# Patient Record
Sex: FEMALE | Race: WHITE | Employment: FULL TIME | URBAN - METROPOLITAN AREA
[De-identification: names, ages, dates, MRNs, and addresses within clinical notes are randomized per-mention and may not be internally consistent; named-entity substitution may affect disease eponyms.]

---

## 2019-05-06 ENCOUNTER — OFFICE VISIT (OUTPATIENT)
Dept: UROLOGY | Age: 39
End: 2019-05-06
Payer: MEDICARE

## 2019-05-06 VITALS
BODY MASS INDEX: 49.87 KG/M2 | HEIGHT: 62 IN | DIASTOLIC BLOOD PRESSURE: 77 MMHG | WEIGHT: 271 LBS | SYSTOLIC BLOOD PRESSURE: 115 MMHG | HEART RATE: 81 BPM | TEMPERATURE: 97.5 F

## 2019-05-06 DIAGNOSIS — R39.13 INTERMITTENT URINARY STREAM: ICD-10-CM

## 2019-05-06 DIAGNOSIS — N39.0 RECURRENT UTI: Primary | ICD-10-CM

## 2019-05-06 DIAGNOSIS — R39.11 HESITANCY: ICD-10-CM

## 2019-05-06 PROCEDURE — 99204 OFFICE O/P NEW MOD 45 MIN: CPT | Performed by: UROLOGY

## 2019-05-06 ASSESSMENT — ENCOUNTER SYMPTOMS
BACK PAIN: 0
EYE REDNESS: 0
NAUSEA: 0
COUGH: 0
SHORTNESS OF BREATH: 0
DIARRHEA: 1
ABDOMINAL PAIN: 0
VOMITING: 0
EYE PAIN: 0
CONSTIPATION: 1
WHEEZING: 0

## 2019-05-06 NOTE — PROGRESS NOTES
Review of Systems   Constitutional: Negative for appetite change, chills and fatigue. Eyes: Negative for pain, redness and visual disturbance. Respiratory: Negative for cough, shortness of breath and wheezing. Cardiovascular: Positive for leg swelling. Negative for chest pain. Gastrointestinal: Positive for constipation and diarrhea. Negative for abdominal pain, nausea and vomiting. Genitourinary: Positive for difficulty urinating, dysuria, flank pain, frequency and urgency. Negative for hematuria. Musculoskeletal: Negative for back pain, joint swelling and myalgias. Skin: Negative for rash and wound. Neurological: Negative for dizziness, weakness and numbness. Hematological: Does not bruise/bleed easily.

## 2019-05-06 NOTE — PROGRESS NOTES
[facility]  [facility] - [place]  [address]  [address]  [address] [facility] [address]  Dept: 92 Rena Dhillon [facility] Urology Office Note - New Patient    Patient:  Jordyn Boyd  YOB: 1980  Date: 5/6/2019    The patient is a 41 y.o. female who presentstoday for evaluation of the following problems:   Chief Complaint   Patient presents with    New Patient     recurrent UTI    referred by Arie Mendez. HPI  Here for recurrent UTI's in the last year. She has had 4 in the last year. She feels she dos not empty her bladder completely. She is not constipated- but has loose stool and sees GI for this. Urinates every 5 hours during the day, nocturia 0. She drinks 2-3 cans of Diet Coke daily. no tea, coffee, no alcohol. No Hx of kidney stones. Has hesitancy and has intermittent stream with feeling of incompletely. Pt has 4 children by vaginal delivery. Ages 3-12. (Patient's old records have been requested, reviewed and summarized in today's note.)    Summary of old records: N/A    Additional History: N/A    ProceduresToday: N/A    Urinalysis today:  No results found for this visit on 05/06/19.     AUA Symptom Score (5/6/2019):  INCOMPLETE EMPTYING: How often have you had the sensation of not emptying your bladder?: More than Half the time  FREQUENCY: How often do you have to urinate less than every two hours?: Not at all  INTERMITTENCY: How often have you found you stopped and started again several times when you urinated?: About Half the time  URGENCY: How often have you found it difficult to postpone urination?: Less than 1 to 5 times  WEAK STREAM: How often have you had a weak urinary stream?: Almost always  @(4494)@  NOCTURIA: How many times did you typically get up at night to uriniate?: NONE  TOTAL I-PSS SCORE[de-identified] 18  How would you feel if you were to spend the rest of your life with your urinary condition?: Unhappy    Last BUN andcreatinine:  Lab Results Component Value Date    BUN 9 10/08/2016     Lab Results   Component Value Date    CREATININE 0.80 10/08/2016       Additional Lab/Culture results: none    Imaging Reviewed during this Office Visit: none  (results were independently reviewed byphysician and radiology report verified)    PAST MEDICAL, FAMILY AND SOCIAL HISTORY:     Past Medical History:   Diagnosis Date    Chronic back pain      No past surgical history on file. No family history on file. Outpatient Medications Marked as Taking for the 5/6/19 encounter (Office Visit) with Lori Gagnon MD   Medication Sig Dispense Refill    ondansetron (ZOFRAN-ODT) 4 MG disintegrating tablet Take 4 mg by mouth every 8 hours as needed for Nausea or Vomiting      loperamide (IMODIUM) 2 MG capsule Take 2 mg by mouth 4 times daily as needed for Diarrhea         Codeine  Social History     Tobacco Use   Smoking Status Never Smoker   Smokeless Tobacco Never Used      (If patient a smoker, smoking cessation counseling offered)   Social History     Substance and Sexual Activity   Alcohol Use Yes    Comment: occa. REVIEW OF SYSTEMS:  Review of Systems    Physical Exam:    This a 41 y.o. female      Vitals:    05/06/19 1445   BP: 115/77   Pulse:    Temp:      Body mass index is 49.57 kg/m². Physical Exam  Constitutional: Patient in no acute distress, ggod grooming, appropriately dressed  Neuro: Alert and oriented to person, place and time. Psych:Mood normal, affect normal  Skin: No rash noted  HEENT: Head: Normocephalic and atraumatic,Conjunctivae and EOM are normal,Nose- normal, Right/Left External Ear: normal, Mouth: Mucosa Moist  Neck: Supple  Lungs: Respiratory effort is normal  Cardiovascular: strong and regular, no lower leg edema  Abdomen: Soft, non-tender, non-distended with no CVA,    Lymphatics: No cervical palpable lymphadenopathy. Bladder non-tender and not distended. Musculoskeletal: Normal gait and station        Assessment and Plan      1. Recurrent UTI    2. Hesitancy    3. Intermittent urinary stream            Plan:   Cysto and urodynamics in the office (pt has voiding dysfunction and rec uti's, history of four vaginal deliveries)  Renal u/s       Prescriptions Ordered:  No orders of the defined types were placed in this encounter. Orders Placed:  Orders Placed This Encounter   Procedures    US Renal Kidney     Standing Status:   Future     Standing Expiration Date:   4/30/2020     Order Specific Question:   Reason for exam:     Answer:   rec uti's            Leticia Vickers MD    Agree with the ROS entered by the MA.

## 2019-05-28 ENCOUNTER — TELEPHONE (OUTPATIENT)
Dept: UROLOGY | Age: 39
End: 2019-05-28

## 2019-05-28 NOTE — TELEPHONE ENCOUNTER
Phone call to patient, she cancelled urodynamics study for 5/29 with Gino Dean CNP. Left message for her to call office back and push option #1 to reschedule before having cysto done in the office. Advised if no call back, cysto will need to be cancelled as provider would like to have Urodynamic study done prior to cysto appointment.

## 2019-06-03 ENCOUNTER — TELEPHONE (OUTPATIENT)
Dept: UROLOGY | Age: 39
End: 2019-06-03

## 2019-06-14 ENCOUNTER — HOSPITAL ENCOUNTER (OUTPATIENT)
Dept: ULTRASOUND IMAGING | Age: 39
Discharge: HOME | End: 2019-06-16
Payer: COMMERCIAL

## 2019-06-14 DIAGNOSIS — N39.0 RECURRENT UTI: ICD-10-CM

## 2019-06-14 PROCEDURE — 76775 US EXAM ABDO BACK WALL LIM: CPT

## 2019-07-24 ENCOUNTER — PROCEDURE VISIT (OUTPATIENT)
Dept: UROLOGY | Age: 39
End: 2019-07-24
Payer: COMMERCIAL

## 2019-07-24 VITALS
HEART RATE: 76 BPM | DIASTOLIC BLOOD PRESSURE: 86 MMHG | TEMPERATURE: 98 F | HEIGHT: 62 IN | SYSTOLIC BLOOD PRESSURE: 123 MMHG | WEIGHT: 269 LBS | BODY MASS INDEX: 49.5 KG/M2

## 2019-07-24 DIAGNOSIS — R33.9 RETENTION OF URINE: ICD-10-CM

## 2019-07-24 DIAGNOSIS — N39.0 RECURRENT UTI: Primary | ICD-10-CM

## 2019-07-24 PROCEDURE — 51741 ELECTRO-UROFLOWMETRY FIRST: CPT | Performed by: UROLOGY

## 2019-07-24 PROCEDURE — 51728 CYSTOMETROGRAM W/VP: CPT | Performed by: UROLOGY

## 2019-07-24 PROCEDURE — 51797 INTRAABDOMINAL PRESSURE TEST: CPT | Performed by: UROLOGY

## 2019-07-24 PROCEDURE — 51784 ANAL/URINARY MUSCLE STUDY: CPT | Performed by: UROLOGY

## 2019-07-24 NOTE — PROGRESS NOTES
Here for UDS. Hx recurrent UTI's, 4, in the last year. 4 Vag deliveries, no Hx stones, no GYN surgeries. She feels she dos not empty her bladder completely, hesitancy, intermittent stream. . She is not constipated- but has loose stool and sees GI for this. Urinates every 5 hours during the day, nocturia 0. She drinks 2-3 cans of Diet Coke daily. no tea, coffee, no alcohol. Rare stress incontinence. Uroflow: 84 ml. Max flow 6 ml/sec, average 6 ml/sec. PVR 50 ml     Urethral and rectal caths placed, and EMG patches applied. Fill rate: 50 ml/hr  1st sensation: 160 ml  1st desire: 210 ml  Strong desire: 243 ml  Capacity 302 ml. Voided 420 ml max flow 10 ml/sec, average flow 6 ml/sec, max detrusor recording 30 cmH2O. Stress at 183 ml and 244 ml produced no leak.

## 2019-07-29 ENCOUNTER — PROCEDURE VISIT (OUTPATIENT)
Dept: UROLOGY | Age: 39
End: 2019-07-29
Payer: COMMERCIAL

## 2019-07-29 VITALS
DIASTOLIC BLOOD PRESSURE: 83 MMHG | HEART RATE: 91 BPM | SYSTOLIC BLOOD PRESSURE: 116 MMHG | TEMPERATURE: 98.1 F | BODY MASS INDEX: 49.5 KG/M2 | WEIGHT: 269 LBS | HEIGHT: 62 IN

## 2019-07-29 DIAGNOSIS — R31.9 HEMATURIA, UNSPECIFIED TYPE: Primary | ICD-10-CM

## 2019-07-29 PROCEDURE — 52000 CYSTOURETHROSCOPY: CPT | Performed by: UROLOGY

## 2019-07-29 RX ORDER — LISINOPRIL 2.5 MG/1
2.5 TABLET ORAL
COMMUNITY
Start: 2019-07-24

## 2019-07-29 RX ORDER — FERROUS SULFATE 325(65) MG
325 TABLET ORAL
COMMUNITY

## 2019-12-06 ENCOUNTER — OFFICE VISIT (OUTPATIENT)
Dept: DERMATOLOGY | Age: 39
End: 2019-12-06
Payer: COMMERCIAL

## 2019-12-06 VITALS
SYSTOLIC BLOOD PRESSURE: 119 MMHG | HEIGHT: 62 IN | HEART RATE: 82 BPM | DIASTOLIC BLOOD PRESSURE: 84 MMHG | BODY MASS INDEX: 47.33 KG/M2 | WEIGHT: 257.2 LBS | OXYGEN SATURATION: 99 %

## 2019-12-06 DIAGNOSIS — L40.8 SEBOPSORIASIS: Primary | ICD-10-CM

## 2019-12-06 PROCEDURE — 1036F TOBACCO NON-USER: CPT | Performed by: DERMATOLOGY

## 2019-12-06 PROCEDURE — G8484 FLU IMMUNIZE NO ADMIN: HCPCS | Performed by: DERMATOLOGY

## 2019-12-06 PROCEDURE — 99202 OFFICE O/P NEW SF 15 MIN: CPT | Performed by: DERMATOLOGY

## 2019-12-06 PROCEDURE — G8428 CUR MEDS NOT DOCUMENT: HCPCS | Performed by: DERMATOLOGY

## 2019-12-06 PROCEDURE — G8417 CALC BMI ABV UP PARAM F/U: HCPCS | Performed by: DERMATOLOGY

## 2019-12-06 RX ORDER — CLOBETASOL PROPIONATE 0.46 MG/ML
SOLUTION TOPICAL
Qty: 50 ML | Refills: 3 | Status: SHIPPED | OUTPATIENT
Start: 2019-12-06 | End: 2020-06-12 | Stop reason: SDUPTHER

## 2020-01-12 ENCOUNTER — HOSPITAL ENCOUNTER (EMERGENCY)
Age: 40
Discharge: HOME | End: 2020-01-12
Attending: EMERGENCY MEDICINE
Payer: COMMERCIAL

## 2020-01-12 VITALS
BODY MASS INDEX: 47.66 KG/M2 | SYSTOLIC BLOOD PRESSURE: 135 MMHG | HEART RATE: 103 BPM | HEIGHT: 62 IN | TEMPERATURE: 99.5 F | RESPIRATION RATE: 16 BRPM | DIASTOLIC BLOOD PRESSURE: 93 MMHG | WEIGHT: 259 LBS | OXYGEN SATURATION: 96 %

## 2020-01-12 LAB
DIRECT EXAM: NORMAL
DIRECT EXAM: NORMAL
Lab: NORMAL
Lab: NORMAL
SPECIMEN DESCRIPTION: NORMAL
SPECIMEN DESCRIPTION: NORMAL

## 2020-01-12 PROCEDURE — 99283 EMERGENCY DEPT VISIT LOW MDM: CPT

## 2020-01-12 PROCEDURE — 87804 INFLUENZA ASSAY W/OPTIC: CPT

## 2020-01-12 PROCEDURE — 87880 STREP A ASSAY W/OPTIC: CPT

## 2020-01-12 ASSESSMENT — PAIN DESCRIPTION - ONSET: ONSET: ON-GOING

## 2020-01-12 ASSESSMENT — PAIN DESCRIPTION - FREQUENCY: FREQUENCY: CONTINUOUS

## 2020-01-12 ASSESSMENT — PAIN DESCRIPTION - LOCATION: LOCATION: BACK

## 2020-01-12 ASSESSMENT — PAIN DESCRIPTION - ORIENTATION: ORIENTATION: LOWER

## 2020-01-12 ASSESSMENT — PAIN SCALES - GENERAL: PAINLEVEL_OUTOF10: 5

## 2020-01-12 ASSESSMENT — PAIN DESCRIPTION - PROGRESSION: CLINICAL_PROGRESSION: GRADUALLY WORSENING

## 2020-01-12 ASSESSMENT — PAIN DESCRIPTION - PAIN TYPE: TYPE: CHRONIC PAIN

## 2020-01-12 ASSESSMENT — PAIN DESCRIPTION - DESCRIPTORS: DESCRIPTORS: ACHING

## 2020-01-12 NOTE — ED PROVIDER NOTES
supportive care instructions and strict return instructions at the bedside. No orders of the defined types were placed in this encounter. CONSULTS:  None      FINAL IMPRESSION      1. Exposure to influenza    2.  Cough    3. Acute pharyngitis, unspecified etiology          DISPOSITION/PLAN:  DISPOSITION Decision To Discharge      PATIENT REFERRED TO:  Sloane Steel  [address] [facility]  [facility] [address]  [address] [place], [place] [address]jk[facility]  [phone]          DISCHARGE MEDICATIONS:  Discharge Medication List as of 1/12/2020 11:46 AM          (Please note that portions of this note were completed with a voice recognition program.  Efforts were made to edit the dictations but occasionally words are mis-transcribed.)    Adeline Ganser, [address] [facility], PA-C  01/12/20 1155

## 2020-02-03 ENCOUNTER — OFFICE VISIT (OUTPATIENT)
Dept: UROLOGY | Age: 40
End: 2020-02-03
Payer: COMMERCIAL

## 2020-02-03 VITALS
WEIGHT: 259 LBS | DIASTOLIC BLOOD PRESSURE: 80 MMHG | TEMPERATURE: 98.4 F | SYSTOLIC BLOOD PRESSURE: 118 MMHG | HEIGHT: 62 IN | BODY MASS INDEX: 47.66 KG/M2

## 2020-02-03 PROCEDURE — 1036F TOBACCO NON-USER: CPT | Performed by: UROLOGY

## 2020-02-03 PROCEDURE — G8417 CALC BMI ABV UP PARAM F/U: HCPCS | Performed by: UROLOGY

## 2020-02-03 PROCEDURE — 99213 OFFICE O/P EST LOW 20 MIN: CPT | Performed by: UROLOGY

## 2020-02-03 PROCEDURE — G8484 FLU IMMUNIZE NO ADMIN: HCPCS | Performed by: UROLOGY

## 2020-02-03 PROCEDURE — G8427 DOCREV CUR MEDS BY ELIG CLIN: HCPCS | Performed by: UROLOGY

## 2020-02-03 ASSESSMENT — ENCOUNTER SYMPTOMS
VOMITING: 0
BACK PAIN: 0
COUGH: 0
COLOR CHANGE: 0
EYE REDNESS: 0
NAUSEA: 0
WHEEZING: 0
SHORTNESS OF BREATH: 0
ABDOMINAL PAIN: 0
EYE PAIN: 0

## 2020-02-03 NOTE — PROGRESS NOTES
Review of Systems   Constitutional: Negative for appetite change, chills and fever. Eyes: Negative for pain, redness and visual disturbance. Respiratory: Negative for cough, shortness of breath and wheezing. Cardiovascular: Negative for chest pain and leg swelling. Gastrointestinal: Negative for abdominal pain, nausea and vomiting. Genitourinary: Negative for difficulty urinating, dysuria, flank pain, frequency, hematuria, urgency and vaginal discharge. Musculoskeletal: Negative for back pain, joint swelling and myalgias. Skin: Negative for color change, rash and wound. Neurological: Negative for dizziness, tremors and numbness. Hematological: Negative for adenopathy. Does not bruise/bleed easily.
pain     Diabetes mellitus ([facility] [address]     Hypertension      No past surgical history on file. No family history on file. Outpatient Medications Marked as Taking for the 2/3/20 encounter (Office Visit) with Kalani Angel MD   Medication Sig Dispense Refill    aspirin 81 MG tablet Take 81 mg by mouth daily      Probiotic Product (PROBIOTIC PO) Take by mouth      clobetasol (TEMOVATE) 0.05 % external solution Apply topically 2 times daily rash on scalp 50 mL 3    Salicylic Acid 3 % SHAM Use 3-4 times weekly leave on for 5 minutes prior to washing off scalp 236 mL 11    metFORMIN (GLUCOPHAGE) 500 MG tablet Take 500 mg by mouth      lisinopril (PRINIVIL;ZESTRIL) 2.5 MG tablet Take 2.5 mg by mouth      ferrous sulfate 325 (65 Fe) MG tablet Take 325 mg by mouth daily (with breakfast)      Multiple Vitamins-Minerals (WOMENS MULTIVITAMIN PO) Take by mouth      loperamide (IMODIUM) 2 MG capsule Take 2 mg by mouth 4 times daily as needed for Diarrhea        (All medications reviewed and updated by provider sincelast office visit or hospitalization)   Codeine  Social History     Tobacco Use   Smoking Status Never Smoker   Smokeless Tobacco Never Used      (If patient a smoker, smoking cessation counseling offered)     Social History     Substance and Sexual Activity   Alcohol Use Yes    Comment: occa. REVIEW OF SYSTEMS:  Review of Systems      Physical Exam:      Vitals:    02/03/20 1600   BP: 118/80   Temp: 98.4 °F (36.9 °C)     Body mass index is 47.37 kg/m². Patient is a 46 y.o. female in noacute distress and alert and oriented to person, place and time. Physical Exam  Constitutional: Patient in no acute distress. Neuro: Alert andoriented to person, place and time.   Psych: Mood normal, affect normal  Skin: No rash noted  HEENT: Head: Normocephalic and atraumatic  Conjunctivae and EOM are normal. Pupils are equal, round  Nose: Normal  Right External Ear: Normal; Left External Ear: Normal  Mouth: Mucosa

## 2020-06-12 ENCOUNTER — OFFICE VISIT (OUTPATIENT)
Dept: DERMATOLOGY | Age: 40
End: 2020-06-12
Payer: COMMERCIAL

## 2020-06-12 VITALS
HEART RATE: 84 BPM | HEIGHT: 62 IN | TEMPERATURE: 98.1 F | OXYGEN SATURATION: 98 % | BODY MASS INDEX: 47.84 KG/M2 | DIASTOLIC BLOOD PRESSURE: 76 MMHG | WEIGHT: 260 LBS | SYSTOLIC BLOOD PRESSURE: 105 MMHG

## 2020-06-12 PROCEDURE — G8417 CALC BMI ABV UP PARAM F/U: HCPCS | Performed by: DERMATOLOGY

## 2020-06-12 PROCEDURE — 1036F TOBACCO NON-USER: CPT | Performed by: DERMATOLOGY

## 2020-06-12 PROCEDURE — G8427 DOCREV CUR MEDS BY ELIG CLIN: HCPCS | Performed by: DERMATOLOGY

## 2020-06-12 PROCEDURE — 99213 OFFICE O/P EST LOW 20 MIN: CPT | Performed by: DERMATOLOGY

## 2020-06-12 RX ORDER — CLOBETASOL PROPIONATE 0.46 MG/ML
SOLUTION TOPICAL
Qty: 50 ML | Refills: 11 | Status: SHIPPED | OUTPATIENT
Start: 2020-06-12

## 2020-06-12 NOTE — PATIENT INSTRUCTIONS
-Continue the current topicals  -Follow up in one year    A dermatofibroma is a common benign fibrous nodule that most often arises on the skin of the lower legs.  A dermatofibroma is also called a cutaneous fibrous histiocytoma

## 2020-06-15 NOTE — PROGRESS NOTES
 Alcohol use: Yes     Comment: occa. REVIEW OF SYSTEMS:  Review of Systems   Constitutional: Negative. Skin:Denies any new changing, growing or bleeding lesions or rashes except as described in the HPI     PHYSICAL EXAM:   /76 (Site: Left Upper Arm, Position: Sitting, Cuff Size: Large Adult)   Pulse 84   Temp 98.1 °F (36.7 °C)   Ht 5' 2.01\" (1.575 m)   Wt 260 lb (117.9 kg)   SpO2 98%   BMI 47.54 kg/m²     General Exam:  General Appearance: No acute distress, Well nourished     Neuro: Alert and oriented to person, place and time  Psych: Normal affect   Lymph Node: Not performed    Cutaneous Exam: Performed as documented in clinic note below. Sun-exposed skin,which includes the head/face, neck, both arms, digits and/or nails was examined. Pertinent Physical Exam Findings:  Physical Exam  Skin:     Comments: Scalp improved minimal scaling and minimal erythema         Medical Necessity of Exam Performed:   Distribution of patient concerns    Additional Diagnostic Testing performed during exam: Not performed ,  Not performed    ASSESSMENT:   Diagnosis Orders   1. Seborrheic dermatitis         Plan of Action is as Follows:  Assessment 1. Seborrheic dermatitis  - Continue T/Sal  - Taper clobetasol to TIW  - Follow up in 1 year          Patient Instructions   -Continue the current topicals  -Follow up in one year    A dermatofibroma is a common benign fibrous nodule that most often arises on the skin of the lower legs. A dermatofibroma is also called a cutaneous fibrous histiocytoma        Photo surveillance performed: No    Follow-up: 1 year    This note was created with the assistance of aspeech-recognition program.  Although the intention is to generate a document that actually reflects thecontent of the visit, no guarantees can be provided that every mistake has been identified and corrected by editing.     Electronically signed by Kee Leonard MD on 6/15/20 at 7:57 AM UMER

## 2020-09-30 ENCOUNTER — HOSPITAL ENCOUNTER (OUTPATIENT)
Age: 40
Discharge: HOME | End: 2020-09-30

## 2020-09-30 LAB — RUBV IGG SER QL: 37.4 IU/ML

## 2020-09-30 PROCEDURE — 86765 RUBEOLA ANTIBODY: CPT

## 2020-09-30 PROCEDURE — 86762 RUBELLA ANTIBODY: CPT

## 2020-09-30 PROCEDURE — 86481 TB AG RESPONSE T-CELL SUSP: CPT

## 2020-09-30 PROCEDURE — 86735 MUMPS ANTIBODY: CPT

## 2020-09-30 PROCEDURE — 86787 VARICELLA-ZOSTER ANTIBODY: CPT

## 2020-10-02 LAB
MEASLES IMMUNE (IGG): 1.29
MUV IGG SER QL: 1.03
VZV IGG SER QL IA: 3.26

## 2020-10-03 LAB — T-SPOT TB TEST: NORMAL

## 2021-01-01 ENCOUNTER — APPOINTMENT (OUTPATIENT)
Dept: CT IMAGING | Age: 41
DRG: 137 | End: 2021-01-01
Payer: COMMERCIAL

## 2021-01-01 ENCOUNTER — ANESTHESIA EVENT (OUTPATIENT)
Dept: ICU | Age: 41
DRG: 137 | End: 2021-01-01
Payer: COMMERCIAL

## 2021-01-01 ENCOUNTER — ANESTHESIA (OUTPATIENT)
Dept: ICU | Age: 41
DRG: 137 | End: 2021-01-01
Payer: COMMERCIAL

## 2021-01-01 ENCOUNTER — APPOINTMENT (OUTPATIENT)
Dept: GENERAL RADIOLOGY | Age: 41
DRG: 137 | End: 2021-01-01
Payer: COMMERCIAL

## 2021-01-01 ENCOUNTER — HOSPITAL ENCOUNTER (INPATIENT)
Age: 41
LOS: 18 days | DRG: 137 | End: 2021-10-09
Attending: EMERGENCY MEDICINE | Admitting: INTERNAL MEDICINE
Payer: COMMERCIAL

## 2021-01-01 VITALS
DIASTOLIC BLOOD PRESSURE: 34 MMHG | SYSTOLIC BLOOD PRESSURE: 53 MMHG | OXYGEN SATURATION: 90 % | HEIGHT: 63 IN | HEART RATE: 39 BPM | TEMPERATURE: 99 F | WEIGHT: 251.32 LBS | BODY MASS INDEX: 44.53 KG/M2

## 2021-01-01 DIAGNOSIS — U07.1 COVID-19: Primary | ICD-10-CM

## 2021-01-01 LAB
-: ABNORMAL
-: NORMAL
ABSOLUTE EOS #: 0 K/UL (ref 0–0.4)
ABSOLUTE EOS #: 0.19 K/UL (ref 0–0.44)
ABSOLUTE EOS #: 0.21 K/UL (ref 0–0.44)
ABSOLUTE EOS #: 0.31 K/UL (ref 0–0.44)
ABSOLUTE EOS #: 0.4 K/UL (ref 0–0.44)
ABSOLUTE EOS #: 0.5 K/UL (ref 0–0.44)
ABSOLUTE IMMATURE GRANULOCYTE: 0.09 K/UL (ref 0–0.3)
ABSOLUTE IMMATURE GRANULOCYTE: 0.09 K/UL (ref 0–0.3)
ABSOLUTE IMMATURE GRANULOCYTE: 0.12 K/UL (ref 0–0.3)
ABSOLUTE IMMATURE GRANULOCYTE: 0.17 K/UL (ref 0–0.3)
ABSOLUTE IMMATURE GRANULOCYTE: 0.21 K/UL (ref 0–0.3)
ABSOLUTE IMMATURE GRANULOCYTE: 0.4 K/UL (ref 0–0.3)
ABSOLUTE LYMPH #: 0.63 K/UL (ref 1–4.8)
ABSOLUTE LYMPH #: 1.07 K/UL (ref 1.1–3.7)
ABSOLUTE LYMPH #: 1.09 K/UL (ref 1.1–3.7)
ABSOLUTE LYMPH #: 1.46 K/UL (ref 1.1–3.7)
ABSOLUTE LYMPH #: 1.81 K/UL (ref 1.1–3.7)
ABSOLUTE LYMPH #: 1.93 K/UL (ref 1.1–3.7)
ABSOLUTE MONO #: 0.23 K/UL (ref 0.1–0.8)
ABSOLUTE MONO #: 0.26 K/UL (ref 0.1–1.2)
ABSOLUTE MONO #: 0.28 K/UL (ref 0.1–1.2)
ABSOLUTE MONO #: 0.5 K/UL (ref 0.1–1.2)
ABSOLUTE MONO #: 0.5 K/UL (ref 0.1–1.2)
ABSOLUTE MONO #: 0.76 K/UL (ref 0.1–1.2)
ACTION: NORMAL
ACTION: NORMAL
ALBUMIN SERPL-MCNC: 2.3 G/DL (ref 3.5–5.2)
ALBUMIN SERPL-MCNC: 3 G/DL (ref 3.5–5.2)
ALBUMIN SERPL-MCNC: 3 G/DL (ref 3.5–5.2)
ALBUMIN SERPL-MCNC: 3.3 G/DL (ref 3.5–5.2)
ALBUMIN SERPL-MCNC: 3.4 G/DL (ref 3.5–5.2)
ALBUMIN SERPL-MCNC: 3.6 G/DL (ref 3.5–5.2)
ALBUMIN/GLOBULIN RATIO: 1.1 (ref 1–2.5)
ALBUMIN/GLOBULIN RATIO: 1.2 (ref 1–2.5)
ALBUMIN/GLOBULIN RATIO: 1.4 (ref 1–2.5)
ALBUMIN/GLOBULIN RATIO: 1.5 (ref 1–2.5)
ALBUMIN/GLOBULIN RATIO: 1.6 (ref 1–2.5)
ALLEN TEST: ABNORMAL
ALLEN TEST: POSITIVE
ALP BLD-CCNC: 108 U/L (ref 35–104)
ALP BLD-CCNC: 177 U/L (ref 35–104)
ALP BLD-CCNC: 82 U/L (ref 35–104)
ALP BLD-CCNC: 84 U/L (ref 35–104)
ALP BLD-CCNC: 84 U/L (ref 35–104)
ALT SERPL-CCNC: 37 U/L (ref 5–33)
ALT SERPL-CCNC: 38 U/L (ref 5–33)
ALT SERPL-CCNC: 3849 U/L (ref 5–33)
ALT SERPL-CCNC: 46 U/L (ref 5–33)
ALT SERPL-CCNC: 53 U/L (ref 5–33)
AMORPHOUS: ABNORMAL
ANION GAP SERPL CALCULATED.3IONS-SCNC: 11 MMOL/L (ref 9–17)
ANION GAP SERPL CALCULATED.3IONS-SCNC: 12 MMOL/L (ref 9–17)
ANION GAP SERPL CALCULATED.3IONS-SCNC: 13 MMOL/L (ref 9–17)
ANION GAP SERPL CALCULATED.3IONS-SCNC: 14 MMOL/L (ref 9–17)
ANION GAP SERPL CALCULATED.3IONS-SCNC: 14 MMOL/L (ref 9–17)
ANION GAP SERPL CALCULATED.3IONS-SCNC: 15 MMOL/L (ref 9–17)
ANION GAP SERPL CALCULATED.3IONS-SCNC: 16 MMOL/L (ref 9–17)
ANION GAP SERPL CALCULATED.3IONS-SCNC: 17 MMOL/L (ref 9–17)
ANION GAP SERPL CALCULATED.3IONS-SCNC: 17 MMOL/L (ref 9–17)
ANION GAP SERPL CALCULATED.3IONS-SCNC: 19 MMOL/L (ref 9–17)
ANION GAP SERPL CALCULATED.3IONS-SCNC: 20 MMOL/L (ref 9–17)
ANION GAP SERPL CALCULATED.3IONS-SCNC: 26 MMOL/L (ref 9–17)
AST SERPL-CCNC: 24 U/L
AST SERPL-CCNC: 25 U/L
AST SERPL-CCNC: 38 U/L
AST SERPL-CCNC: 4216 U/L
AST SERPL-CCNC: 48 U/L
BACTERIA: ABNORMAL
BASOPHILS # BLD: 0 % (ref 0–2)
BASOPHILS # BLD: 1 % (ref 0–2)
BASOPHILS ABSOLUTE: 0 K/UL (ref 0–0.2)
BASOPHILS ABSOLUTE: 0.03 K/UL (ref 0–0.2)
BASOPHILS ABSOLUTE: 0.04 K/UL (ref 0–0.2)
BASOPHILS ABSOLUTE: 0.05 K/UL (ref 0–0.2)
BASOPHILS ABSOLUTE: 0.06 K/UL (ref 0–0.2)
BASOPHILS ABSOLUTE: <0.03 K/UL (ref 0–0.2)
BETA-HYDROXYBUTYRATE: 0.1 MMOL/L (ref 0.02–0.27)
BETA-HYDROXYBUTYRATE: 1.82 MMOL/L (ref 0.02–0.27)
BILIRUB SERPL-MCNC: 0.54 MG/DL (ref 0.3–1.2)
BILIRUB SERPL-MCNC: 0.91 MG/DL (ref 0.3–1.2)
BILIRUB SERPL-MCNC: 0.92 MG/DL (ref 0.3–1.2)
BILIRUB SERPL-MCNC: 1.18 MG/DL (ref 0.3–1.2)
BILIRUB SERPL-MCNC: 2.52 MG/DL (ref 0.3–1.2)
BILIRUBIN DIRECT: 0.19 MG/DL
BILIRUBIN DIRECT: 0.2 MG/DL
BILIRUBIN DIRECT: 0.22 MG/DL
BILIRUBIN DIRECT: 0.3 MG/DL
BILIRUBIN URINE: NEGATIVE
BILIRUBIN, INDIRECT: 0.35 MG/DL (ref 0–1)
BILIRUBIN, INDIRECT: 0.7 MG/DL (ref 0–1)
BILIRUBIN, INDIRECT: 0.71 MG/DL (ref 0–1)
BILIRUBIN, INDIRECT: 0.88 MG/DL (ref 0–1)
BNP INTERPRETATION: NORMAL
BUN BLDV-MCNC: 14 MG/DL (ref 6–20)
BUN BLDV-MCNC: 16 MG/DL (ref 6–20)
BUN BLDV-MCNC: 18 MG/DL (ref 6–20)
BUN BLDV-MCNC: 19 MG/DL (ref 6–20)
BUN BLDV-MCNC: 19 MG/DL (ref 6–20)
BUN BLDV-MCNC: 21 MG/DL (ref 6–20)
BUN BLDV-MCNC: 22 MG/DL (ref 6–20)
BUN BLDV-MCNC: 24 MG/DL (ref 6–20)
BUN BLDV-MCNC: 26 MG/DL (ref 6–20)
BUN BLDV-MCNC: 26 MG/DL (ref 6–20)
BUN BLDV-MCNC: 27 MG/DL (ref 6–20)
BUN BLDV-MCNC: 27 MG/DL (ref 6–20)
BUN BLDV-MCNC: 29 MG/DL (ref 6–20)
BUN BLDV-MCNC: 30 MG/DL (ref 6–20)
BUN BLDV-MCNC: 31 MG/DL (ref 6–20)
BUN BLDV-MCNC: 34 MG/DL (ref 6–20)
BUN/CREAT BLD: ABNORMAL (ref 9–20)
C-REACTIVE PROTEIN: 128.3 MG/L (ref 0–5)
C-REACTIVE PROTEIN: 143.9 MG/L (ref 0–5)
C-REACTIVE PROTEIN: 202.7 MG/L (ref 0–5)
C-REACTIVE PROTEIN: 205.8 MG/L (ref 0–5)
C-REACTIVE PROTEIN: 47.2 MG/L (ref 0–5)
C-REACTIVE PROTEIN: 5.9 MG/L (ref 0–5)
C-REACTIVE PROTEIN: <3 MG/L (ref 0–5)
CALCIUM SERPL-MCNC: 10.4 MG/DL (ref 8.6–10.4)
CALCIUM SERPL-MCNC: 8 MG/DL (ref 8.6–10.4)
CALCIUM SERPL-MCNC: 8.1 MG/DL (ref 8.6–10.4)
CALCIUM SERPL-MCNC: 8.2 MG/DL (ref 8.6–10.4)
CALCIUM SERPL-MCNC: 8.2 MG/DL (ref 8.6–10.4)
CALCIUM SERPL-MCNC: 8.4 MG/DL (ref 8.6–10.4)
CALCIUM SERPL-MCNC: 8.4 MG/DL (ref 8.6–10.4)
CALCIUM SERPL-MCNC: 8.7 MG/DL (ref 8.6–10.4)
CALCIUM SERPL-MCNC: 8.8 MG/DL (ref 8.6–10.4)
CALCIUM SERPL-MCNC: 9 MG/DL (ref 8.6–10.4)
CALCIUM SERPL-MCNC: 9.1 MG/DL (ref 8.6–10.4)
CASTS UA: ABNORMAL /LPF (ref 0–8)
CHLORIDE BLD-SCNC: 100 MMOL/L (ref 98–107)
CHLORIDE BLD-SCNC: 101 MMOL/L (ref 98–107)
CHLORIDE BLD-SCNC: 104 MMOL/L (ref 98–107)
CHLORIDE BLD-SCNC: 93 MMOL/L (ref 98–107)
CHLORIDE BLD-SCNC: 94 MMOL/L (ref 98–107)
CHLORIDE BLD-SCNC: 95 MMOL/L (ref 98–107)
CHLORIDE BLD-SCNC: 96 MMOL/L (ref 98–107)
CHLORIDE BLD-SCNC: 97 MMOL/L (ref 98–107)
CHLORIDE BLD-SCNC: 98 MMOL/L (ref 98–107)
CHLORIDE BLD-SCNC: 98 MMOL/L (ref 98–107)
CHLORIDE BLD-SCNC: 99 MMOL/L (ref 98–107)
CO2: 17 MMOL/L (ref 20–31)
CO2: 18 MMOL/L (ref 20–31)
CO2: 19 MMOL/L (ref 20–31)
CO2: 20 MMOL/L (ref 20–31)
CO2: 21 MMOL/L (ref 20–31)
CO2: 22 MMOL/L (ref 20–31)
CO2: 24 MMOL/L (ref 20–31)
CO2: 26 MMOL/L (ref 20–31)
CO2: 27 MMOL/L (ref 20–31)
CO2: 28 MMOL/L (ref 20–31)
CO2: 28 MMOL/L (ref 20–31)
CO2: 30 MMOL/L (ref 20–31)
COLOR: YELLOW
COMMENT UA: ABNORMAL
CREAT SERPL-MCNC: 0.54 MG/DL (ref 0.5–0.9)
CREAT SERPL-MCNC: 0.56 MG/DL (ref 0.5–0.9)
CREAT SERPL-MCNC: 0.59 MG/DL (ref 0.5–0.9)
CREAT SERPL-MCNC: 0.6 MG/DL (ref 0.5–0.9)
CREAT SERPL-MCNC: 0.64 MG/DL (ref 0.5–0.9)
CREAT SERPL-MCNC: 0.66 MG/DL (ref 0.5–0.9)
CREAT SERPL-MCNC: 0.67 MG/DL (ref 0.5–0.9)
CREAT SERPL-MCNC: 0.67 MG/DL (ref 0.5–0.9)
CREAT SERPL-MCNC: 0.68 MG/DL (ref 0.5–0.9)
CREAT SERPL-MCNC: 0.69 MG/DL (ref 0.5–0.9)
CREAT SERPL-MCNC: 0.71 MG/DL (ref 0.5–0.9)
CREAT SERPL-MCNC: 0.75 MG/DL (ref 0.5–0.9)
CREAT SERPL-MCNC: 0.76 MG/DL (ref 0.5–0.9)
CREAT SERPL-MCNC: 0.83 MG/DL (ref 0.5–0.9)
CREAT SERPL-MCNC: 0.92 MG/DL (ref 0.5–0.9)
CREAT SERPL-MCNC: 1.42 MG/DL (ref 0.5–0.9)
CRYSTALS, UA: ABNORMAL /HPF
CULTURE: ABNORMAL
D-DIMER QUANTITATIVE: 0.54 MG/L FEU
D-DIMER QUANTITATIVE: 0.56 MG/L FEU
DATE AND TIME: NORMAL
DATE AND TIME: NORMAL
DIFFERENTIAL TYPE: ABNORMAL
EKG ATRIAL RATE: 110 BPM
EKG P AXIS: 15 DEGREES
EKG P-R INTERVAL: 158 MS
EKG Q-T INTERVAL: 356 MS
EKG QRS DURATION: 102 MS
EKG QTC CALCULATION (BAZETT): 481 MS
EKG R AXIS: 118 DEGREES
EKG T AXIS: 51 DEGREES
EKG VENTRICULAR RATE: 110 BPM
EOSINOPHILS RELATIVE PERCENT: 0 % (ref 1–4)
EOSINOPHILS RELATIVE PERCENT: 2 % (ref 1–4)
EOSINOPHILS RELATIVE PERCENT: 2 % (ref 1–4)
EOSINOPHILS RELATIVE PERCENT: 3 % (ref 1–4)
EOSINOPHILS RELATIVE PERCENT: 4 % (ref 1–4)
EOSINOPHILS RELATIVE PERCENT: 5 % (ref 1–4)
EPITHELIAL CELLS UA: ABNORMAL /HPF (ref 0–5)
FERRITIN: 706 UG/L (ref 13–150)
FERRITIN: 756 UG/L (ref 13–150)
FIBRINOGEN: 583 MG/DL (ref 140–420)
FIO2: 100
FIO2: 60
GFR AFRICAN AMERICAN: 50 ML/MIN
GFR AFRICAN AMERICAN: >60 ML/MIN
GFR NON-AFRICAN AMERICAN: 41 ML/MIN
GFR NON-AFRICAN AMERICAN: >60 ML/MIN
GFR SERPL CREATININE-BSD FRML MDRD: ABNORMAL ML/MIN/{1.73_M2}
GLOBULIN: ABNORMAL G/DL (ref 1.5–3.8)
GLUCOSE BLD-MCNC: 101 MG/DL (ref 65–105)
GLUCOSE BLD-MCNC: 105 MG/DL (ref 70–99)
GLUCOSE BLD-MCNC: 106 MG/DL (ref 65–105)
GLUCOSE BLD-MCNC: 111 MG/DL (ref 65–105)
GLUCOSE BLD-MCNC: 117 MG/DL (ref 65–105)
GLUCOSE BLD-MCNC: 133 MG/DL (ref 65–105)
GLUCOSE BLD-MCNC: 136 MG/DL (ref 65–105)
GLUCOSE BLD-MCNC: 141 MG/DL (ref 65–105)
GLUCOSE BLD-MCNC: 142 MG/DL (ref 65–105)
GLUCOSE BLD-MCNC: 149 MG/DL (ref 65–105)
GLUCOSE BLD-MCNC: 154 MG/DL (ref 65–105)
GLUCOSE BLD-MCNC: 156 MG/DL (ref 65–105)
GLUCOSE BLD-MCNC: 159 MG/DL (ref 65–105)
GLUCOSE BLD-MCNC: 159 MG/DL (ref 65–105)
GLUCOSE BLD-MCNC: 163 MG/DL (ref 65–105)
GLUCOSE BLD-MCNC: 171 MG/DL (ref 65–105)
GLUCOSE BLD-MCNC: 172 MG/DL (ref 65–105)
GLUCOSE BLD-MCNC: 173 MG/DL (ref 65–105)
GLUCOSE BLD-MCNC: 173 MG/DL (ref 70–99)
GLUCOSE BLD-MCNC: 174 MG/DL (ref 65–105)
GLUCOSE BLD-MCNC: 190 MG/DL (ref 65–105)
GLUCOSE BLD-MCNC: 193 MG/DL (ref 65–105)
GLUCOSE BLD-MCNC: 194 MG/DL (ref 70–99)
GLUCOSE BLD-MCNC: 194 MG/DL (ref 70–99)
GLUCOSE BLD-MCNC: 196 MG/DL (ref 70–99)
GLUCOSE BLD-MCNC: 203 MG/DL (ref 70–99)
GLUCOSE BLD-MCNC: 212 MG/DL (ref 65–105)
GLUCOSE BLD-MCNC: 215 MG/DL (ref 65–105)
GLUCOSE BLD-MCNC: 218 MG/DL (ref 65–105)
GLUCOSE BLD-MCNC: 222 MG/DL (ref 65–105)
GLUCOSE BLD-MCNC: 223 MG/DL (ref 74–100)
GLUCOSE BLD-MCNC: 226 MG/DL (ref 65–105)
GLUCOSE BLD-MCNC: 229 MG/DL (ref 65–105)
GLUCOSE BLD-MCNC: 230 MG/DL (ref 65–105)
GLUCOSE BLD-MCNC: 232 MG/DL (ref 65–105)
GLUCOSE BLD-MCNC: 234 MG/DL (ref 65–105)
GLUCOSE BLD-MCNC: 234 MG/DL (ref 65–105)
GLUCOSE BLD-MCNC: 237 MG/DL (ref 65–105)
GLUCOSE BLD-MCNC: 239 MG/DL (ref 65–105)
GLUCOSE BLD-MCNC: 241 MG/DL (ref 65–105)
GLUCOSE BLD-MCNC: 247 MG/DL (ref 65–105)
GLUCOSE BLD-MCNC: 248 MG/DL (ref 65–105)
GLUCOSE BLD-MCNC: 249 MG/DL (ref 65–105)
GLUCOSE BLD-MCNC: 253 MG/DL (ref 65–105)
GLUCOSE BLD-MCNC: 255 MG/DL (ref 65–105)
GLUCOSE BLD-MCNC: 257 MG/DL (ref 65–105)
GLUCOSE BLD-MCNC: 260 MG/DL (ref 65–105)
GLUCOSE BLD-MCNC: 260 MG/DL (ref 65–105)
GLUCOSE BLD-MCNC: 261 MG/DL (ref 65–105)
GLUCOSE BLD-MCNC: 264 MG/DL (ref 65–105)
GLUCOSE BLD-MCNC: 272 MG/DL (ref 70–99)
GLUCOSE BLD-MCNC: 279 MG/DL (ref 65–105)
GLUCOSE BLD-MCNC: 289 MG/DL (ref 65–105)
GLUCOSE BLD-MCNC: 290 MG/DL (ref 65–105)
GLUCOSE BLD-MCNC: 290 MG/DL (ref 65–105)
GLUCOSE BLD-MCNC: 294 MG/DL (ref 70–99)
GLUCOSE BLD-MCNC: 302 MG/DL (ref 65–105)
GLUCOSE BLD-MCNC: 314 MG/DL (ref 65–105)
GLUCOSE BLD-MCNC: 320 MG/DL (ref 65–105)
GLUCOSE BLD-MCNC: 320 MG/DL (ref 65–105)
GLUCOSE BLD-MCNC: 322 MG/DL (ref 65–105)
GLUCOSE BLD-MCNC: 325 MG/DL (ref 65–105)
GLUCOSE BLD-MCNC: 329 MG/DL (ref 70–99)
GLUCOSE BLD-MCNC: 330 MG/DL (ref 65–105)
GLUCOSE BLD-MCNC: 336 MG/DL (ref 65–105)
GLUCOSE BLD-MCNC: 337 MG/DL (ref 70–99)
GLUCOSE BLD-MCNC: 339 MG/DL (ref 65–105)
GLUCOSE BLD-MCNC: 341 MG/DL (ref 70–99)
GLUCOSE BLD-MCNC: 345 MG/DL (ref 70–99)
GLUCOSE BLD-MCNC: 346 MG/DL (ref 65–105)
GLUCOSE BLD-MCNC: 347 MG/DL (ref 65–105)
GLUCOSE BLD-MCNC: 347 MG/DL (ref 65–105)
GLUCOSE BLD-MCNC: 351 MG/DL (ref 65–105)
GLUCOSE BLD-MCNC: 351 MG/DL (ref 65–105)
GLUCOSE BLD-MCNC: 354 MG/DL (ref 65–105)
GLUCOSE BLD-MCNC: 358 MG/DL (ref 65–105)
GLUCOSE BLD-MCNC: 358 MG/DL (ref 70–99)
GLUCOSE BLD-MCNC: 359 MG/DL (ref 65–105)
GLUCOSE BLD-MCNC: 368 MG/DL (ref 65–105)
GLUCOSE BLD-MCNC: 368 MG/DL (ref 70–99)
GLUCOSE BLD-MCNC: 372 MG/DL (ref 65–105)
GLUCOSE BLD-MCNC: 374 MG/DL (ref 65–105)
GLUCOSE BLD-MCNC: 375 MG/DL (ref 65–105)
GLUCOSE BLD-MCNC: 384 MG/DL (ref 65–105)
GLUCOSE BLD-MCNC: 392 MG/DL (ref 65–105)
GLUCOSE BLD-MCNC: 394 MG/DL (ref 65–105)
GLUCOSE BLD-MCNC: 400 MG/DL (ref 65–105)
GLUCOSE BLD-MCNC: 403 MG/DL (ref 65–105)
GLUCOSE BLD-MCNC: 424 MG/DL (ref 65–105)
GLUCOSE BLD-MCNC: 426 MG/DL (ref 65–105)
GLUCOSE BLD-MCNC: 446 MG/DL (ref 65–105)
GLUCOSE BLD-MCNC: 456 MG/DL (ref 65–105)
GLUCOSE BLD-MCNC: 469 MG/DL (ref 70–99)
GLUCOSE BLD-MCNC: 474 MG/DL (ref 74–100)
GLUCOSE BLD-MCNC: 495 MG/DL (ref 74–100)
GLUCOSE BLD-MCNC: 56 MG/DL (ref 65–105)
GLUCOSE BLD-MCNC: 57 MG/DL (ref 65–105)
GLUCOSE BLD-MCNC: 57 MG/DL (ref 65–105)
GLUCOSE BLD-MCNC: 67 MG/DL (ref 65–105)
GLUCOSE BLD-MCNC: 78 MG/DL (ref 65–105)
GLUCOSE BLD-MCNC: 80 MG/DL (ref 65–105)
GLUCOSE BLD-MCNC: 86 MG/DL (ref 65–105)
GLUCOSE BLD-MCNC: 92 MG/DL (ref 70–99)
GLUCOSE URINE: ABNORMAL
HCO3 VENOUS: 26.9 MMOL/L (ref 22–29)
HCT VFR BLD CALC: 41.6 % (ref 36.3–47.1)
HCT VFR BLD CALC: 42 % (ref 36.3–47.1)
HCT VFR BLD CALC: 43.4 % (ref 36.3–47.1)
HCT VFR BLD CALC: 43.5 % (ref 36.3–47.1)
HCT VFR BLD CALC: 43.9 % (ref 36.3–47.1)
HCT VFR BLD CALC: 44.1 % (ref 36.3–47.1)
HCT VFR BLD CALC: 44.9 % (ref 36.3–47.1)
HCT VFR BLD CALC: 44.9 % (ref 36.3–47.1)
HCT VFR BLD CALC: 45.1 % (ref 36.3–47.1)
HCT VFR BLD CALC: 45.7 % (ref 36.3–47.1)
HCT VFR BLD CALC: 45.7 % (ref 36.3–47.1)
HCT VFR BLD CALC: 48.3 % (ref 36.3–47.1)
HEMOGLOBIN: 13.4 G/DL (ref 11.9–15.1)
HEMOGLOBIN: 14.2 G/DL (ref 11.9–15.1)
HEMOGLOBIN: 14.3 G/DL (ref 11.9–15.1)
HEMOGLOBIN: 14.7 G/DL (ref 11.9–15.1)
HEMOGLOBIN: 14.9 G/DL (ref 11.9–15.1)
HEMOGLOBIN: 15 G/DL (ref 11.9–15.1)
HEMOGLOBIN: 15.2 G/DL (ref 11.9–15.1)
HEMOGLOBIN: 15.2 G/DL (ref 11.9–15.1)
HEMOGLOBIN: 15.5 G/DL (ref 11.9–15.1)
HEMOGLOBIN: 15.6 G/DL (ref 11.9–15.1)
HEMOGLOBIN: 15.7 G/DL (ref 11.9–15.1)
HEMOGLOBIN: 15.7 G/DL (ref 11.9–15.1)
IMMATURE GRANULOCYTES: 1 %
IMMATURE GRANULOCYTES: 2 %
IMMATURE GRANULOCYTES: 2 %
IMMATURE GRANULOCYTES: 4 %
KETONES, URINE: ABNORMAL
LACTIC ACID, WHOLE BLOOD: 12.4 MMOL/L (ref 0.7–2.1)
LEUKOCYTE ESTERASE, URINE: NEGATIVE
LYMPHOCYTES # BLD: 10 % (ref 24–43)
LYMPHOCYTES # BLD: 10 % (ref 24–43)
LYMPHOCYTES # BLD: 12 % (ref 24–43)
LYMPHOCYTES # BLD: 14 % (ref 24–44)
LYMPHOCYTES # BLD: 16 % (ref 24–43)
LYMPHOCYTES # BLD: 17 % (ref 24–43)
Lab: ABNORMAL
MAGNESIUM: 1.7 MG/DL (ref 1.6–2.6)
MAGNESIUM: 1.8 MG/DL (ref 1.6–2.6)
MAGNESIUM: 2 MG/DL (ref 1.6–2.6)
MAGNESIUM: 2.1 MG/DL (ref 1.6–2.6)
MAGNESIUM: 2.3 MG/DL (ref 1.6–2.6)
MAGNESIUM: 3.2 MG/DL (ref 1.6–2.6)
MCH RBC QN AUTO: 29.1 PG (ref 25.2–33.5)
MCH RBC QN AUTO: 29.1 PG (ref 25.2–33.5)
MCH RBC QN AUTO: 29.3 PG (ref 25.2–33.5)
MCH RBC QN AUTO: 29.5 PG (ref 25.2–33.5)
MCH RBC QN AUTO: 29.5 PG (ref 25.2–33.5)
MCH RBC QN AUTO: 29.7 PG (ref 25.2–33.5)
MCH RBC QN AUTO: 29.9 PG (ref 25.2–33.5)
MCH RBC QN AUTO: 30 PG (ref 25.2–33.5)
MCH RBC QN AUTO: 30.2 PG (ref 25.2–33.5)
MCH RBC QN AUTO: 30.2 PG (ref 25.2–33.5)
MCHC RBC AUTO-ENTMCNC: 30.9 G/DL (ref 28.4–34.8)
MCHC RBC AUTO-ENTMCNC: 31.1 G/DL (ref 28.4–34.8)
MCHC RBC AUTO-ENTMCNC: 33.3 G/DL (ref 28.4–34.8)
MCHC RBC AUTO-ENTMCNC: 33.7 G/DL (ref 28.4–34.8)
MCHC RBC AUTO-ENTMCNC: 33.9 G/DL (ref 28.4–34.8)
MCHC RBC AUTO-ENTMCNC: 34 G/DL (ref 28.4–34.8)
MCHC RBC AUTO-ENTMCNC: 34.1 G/DL (ref 28.4–34.8)
MCHC RBC AUTO-ENTMCNC: 34.1 G/DL (ref 28.4–34.8)
MCHC RBC AUTO-ENTMCNC: 34.3 G/DL (ref 28.4–34.8)
MCHC RBC AUTO-ENTMCNC: 34.4 G/DL (ref 28.4–34.8)
MCHC RBC AUTO-ENTMCNC: 34.5 G/DL (ref 28.4–34.8)
MCHC RBC AUTO-ENTMCNC: 35.8 G/DL (ref 28.4–34.8)
MCV RBC AUTO: 84.4 FL (ref 82.6–102.9)
MCV RBC AUTO: 85 FL (ref 82.6–102.9)
MCV RBC AUTO: 85.4 FL (ref 82.6–102.9)
MCV RBC AUTO: 85.5 FL (ref 82.6–102.9)
MCV RBC AUTO: 86.4 FL (ref 82.6–102.9)
MCV RBC AUTO: 86.5 FL (ref 82.6–102.9)
MCV RBC AUTO: 87.4 FL (ref 82.6–102.9)
MCV RBC AUTO: 87.9 FL (ref 82.6–102.9)
MCV RBC AUTO: 88.5 FL (ref 82.6–102.9)
MCV RBC AUTO: 89.1 FL (ref 82.6–102.9)
MCV RBC AUTO: 95.6 FL (ref 82.6–102.9)
MCV RBC AUTO: 97.3 FL (ref 82.6–102.9)
MODE: ABNORMAL
MODE: NORMAL
MONOCYTES # BLD: 3 % (ref 3–12)
MONOCYTES # BLD: 3 % (ref 3–12)
MONOCYTES # BLD: 4 % (ref 3–12)
MONOCYTES # BLD: 5 % (ref 1–7)
MONOCYTES # BLD: 5 % (ref 3–12)
MONOCYTES # BLD: 6 % (ref 3–12)
MORPHOLOGY: NORMAL
MUCUS: ABNORMAL
NEGATIVE BASE EXCESS, ART: 1 (ref 0–2)
NEGATIVE BASE EXCESS, ART: ABNORMAL (ref 0–2)
NEGATIVE BASE EXCESS, VEN: 10 (ref 0–2)
NITRITE, URINE: NEGATIVE
NOTIFY: NORMAL
NOTIFY: NORMAL
NRBC AUTOMATED: 0 PER 100 WBC
NRBC AUTOMATED: 0.1 PER 100 WBC
NRBC AUTOMATED: 0.3 PER 100 WBC
O2 DEVICE/FLOW/%: ABNORMAL
O2 DEVICE/FLOW/%: NORMAL
O2 SAT, VEN: 72 % (ref 60–85)
OTHER OBSERVATIONS UA: ABNORMAL
PATIENT TEMP: ABNORMAL
PATIENT TEMP: NORMAL
PCO2, VEN: 124.3 MM HG (ref 41–51)
PDW BLD-RTO: 12.8 % (ref 11.8–14.4)
PDW BLD-RTO: 12.9 % (ref 11.8–14.4)
PDW BLD-RTO: 13.1 % (ref 11.8–14.4)
PDW BLD-RTO: 13.2 % (ref 11.8–14.4)
PDW BLD-RTO: 13.3 % (ref 11.8–14.4)
PDW BLD-RTO: 13.3 % (ref 11.8–14.4)
PDW BLD-RTO: 13.4 % (ref 11.8–14.4)
PDW BLD-RTO: 13.4 % (ref 11.8–14.4)
PDW BLD-RTO: 13.9 % (ref 11.8–14.4)
PDW BLD-RTO: 14.1 % (ref 11.8–14.4)
PH UA: 6 (ref 5–8)
PH VENOUS: 6.94 (ref 7.32–7.43)
PHOSPHORUS: 1.2 MG/DL (ref 2.6–4.5)
PHOSPHORUS: 1.7 MG/DL (ref 2.6–4.5)
PHOSPHORUS: 2.1 MG/DL (ref 2.6–4.5)
PHOSPHORUS: 4 MG/DL (ref 2.6–4.5)
PLATELET # BLD: 131 K/UL (ref 138–453)
PLATELET # BLD: 152 K/UL (ref 138–453)
PLATELET # BLD: 169 K/UL (ref 138–453)
PLATELET # BLD: 169 K/UL (ref 138–453)
PLATELET # BLD: 183 K/UL (ref 138–453)
PLATELET # BLD: 194 K/UL (ref 138–453)
PLATELET # BLD: 216 K/UL (ref 138–453)
PLATELET # BLD: 223 K/UL (ref 138–453)
PLATELET # BLD: 238 K/UL (ref 138–453)
PLATELET # BLD: 244 K/UL (ref 138–453)
PLATELET # BLD: 264 K/UL (ref 138–453)
PLATELET # BLD: ABNORMAL K/UL (ref 138–453)
PLATELET ESTIMATE: ABNORMAL
PLATELET, FLUORESCENCE: 119 K/UL (ref 138–453)
PLATELET, IMMATURE FRACTION: 2.2 % (ref 1.1–10.3)
PMV BLD AUTO: 10.2 FL (ref 8.1–13.5)
PMV BLD AUTO: 10.7 FL (ref 8.1–13.5)
PMV BLD AUTO: 10.7 FL (ref 8.1–13.5)
PMV BLD AUTO: 10.8 FL (ref 8.1–13.5)
PMV BLD AUTO: 10.8 FL (ref 8.1–13.5)
PMV BLD AUTO: 11.2 FL (ref 8.1–13.5)
PMV BLD AUTO: 11.5 FL (ref 8.1–13.5)
PMV BLD AUTO: 11.7 FL (ref 8.1–13.5)
PMV BLD AUTO: 12 FL (ref 8.1–13.5)
PMV BLD AUTO: 12.5 FL (ref 8.1–13.5)
PMV BLD AUTO: 12.5 FL (ref 8.1–13.5)
PMV BLD AUTO: ABNORMAL FL (ref 8.1–13.5)
PO2, VEN: 63 MM HG (ref 30–50)
POC HCO3: 22.7 MMOL/L (ref 21–28)
POC HCO3: 28.1 MMOL/L (ref 21–28)
POC HCO3: 28.7 MMOL/L (ref 21–28)
POC HCO3: 28.9 MMOL/L (ref 21–28)
POC LACTIC ACID: 1.21 MMOL/L (ref 0.56–1.39)
POC O2 SATURATION: 100 % (ref 94–98)
POC O2 SATURATION: 93 % (ref 94–98)
POC O2 SATURATION: 94 % (ref 94–98)
POC O2 SATURATION: 97 % (ref 94–98)
POC PCO2 TEMP: ABNORMAL MM HG
POC PCO2 TEMP: NORMAL MM HG
POC PCO2: 35 MM HG (ref 35–48)
POC PCO2: 35.4 MM HG (ref 35–48)
POC PCO2: 41.7 MM HG (ref 35–48)
POC PCO2: 50.1 MM HG (ref 35–48)
POC PH TEMP: ABNORMAL
POC PH TEMP: NORMAL
POC PH: 7.36 (ref 7.35–7.45)
POC PH: 7.41 (ref 7.35–7.45)
POC PH: 7.45 (ref 7.35–7.45)
POC PH: 7.52 (ref 7.35–7.45)
POC PO2 TEMP: ABNORMAL MM HG
POC PO2 TEMP: NORMAL MM HG
POC PO2: 178.4 MM HG (ref 83–108)
POC PO2: 69.5 MM HG (ref 83–108)
POC PO2: 72.5 MM HG (ref 83–108)
POC PO2: 85.4 MM HG (ref 83–108)
POSITIVE BASE EXCESS, ART: 2 (ref 0–3)
POSITIVE BASE EXCESS, ART: 4 (ref 0–3)
POSITIVE BASE EXCESS, ART: 6 (ref 0–3)
POSITIVE BASE EXCESS, ART: NORMAL (ref 0–3)
POSITIVE BASE EXCESS, VEN: ABNORMAL (ref 0–3)
POTASSIUM SERPL-SCNC: 3.5 MMOL/L (ref 3.7–5.3)
POTASSIUM SERPL-SCNC: 3.6 MMOL/L (ref 3.7–5.3)
POTASSIUM SERPL-SCNC: 3.7 MMOL/L (ref 3.7–5.3)
POTASSIUM SERPL-SCNC: 3.7 MMOL/L (ref 3.7–5.3)
POTASSIUM SERPL-SCNC: 3.8 MMOL/L (ref 3.7–5.3)
POTASSIUM SERPL-SCNC: 3.9 MMOL/L (ref 3.7–5.3)
POTASSIUM SERPL-SCNC: 4 MMOL/L (ref 3.7–5.3)
POTASSIUM SERPL-SCNC: 4 MMOL/L (ref 3.7–5.3)
POTASSIUM SERPL-SCNC: 4.1 MMOL/L (ref 3.7–5.3)
POTASSIUM SERPL-SCNC: 4.1 MMOL/L (ref 3.7–5.3)
POTASSIUM SERPL-SCNC: 4.2 MMOL/L (ref 3.7–5.3)
POTASSIUM SERPL-SCNC: 4.4 MMOL/L (ref 3.7–5.3)
POTASSIUM SERPL-SCNC: 4.5 MMOL/L (ref 3.7–5.3)
POTASSIUM SERPL-SCNC: 7 MMOL/L (ref 3.7–5.3)
PRO-BNP: 212 PG/ML
PROCALCITONIN: 0.09 NG/ML
PROCALCITONIN: 0.13 NG/ML
PROTEIN UA: ABNORMAL
RBC # BLD: 4.46 M/UL (ref 3.95–5.11)
RBC # BLD: 4.7 M/UL (ref 3.95–5.11)
RBC # BLD: 4.91 M/UL (ref 3.95–5.11)
RBC # BLD: 4.95 M/UL (ref 3.95–5.11)
RBC # BLD: 5.05 M/UL (ref 3.95–5.11)
RBC # BLD: 5.11 M/UL (ref 3.95–5.11)
RBC # BLD: 5.12 M/UL (ref 3.95–5.11)
RBC # BLD: 5.16 M/UL (ref 3.95–5.11)
RBC # BLD: 5.19 M/UL (ref 3.95–5.11)
RBC # BLD: 5.2 M/UL (ref 3.95–5.11)
RBC # BLD: 5.29 M/UL (ref 3.95–5.11)
RBC # BLD: 5.35 M/UL (ref 3.95–5.11)
RBC # BLD: ABNORMAL 10*6/UL
RBC UA: ABNORMAL /HPF (ref 0–4)
READ BACK: YES
READ BACK: YES
REASON FOR REJECTION: NORMAL
RENAL EPITHELIAL, UA: ABNORMAL /HPF
SAMPLE SITE: ABNORMAL
SAMPLE SITE: NORMAL
SARS-COV-2, RAPID: DETECTED
SEG NEUTROPHILS: 75 % (ref 36–65)
SEG NEUTROPHILS: 75 % (ref 36–65)
SEG NEUTROPHILS: 79 % (ref 36–65)
SEG NEUTROPHILS: 79 % (ref 36–65)
SEG NEUTROPHILS: 79 % (ref 36–66)
SEG NEUTROPHILS: 80 % (ref 36–65)
SEGMENTED NEUTROPHILS ABSOLUTE COUNT: 3.55 K/UL (ref 1.8–7.7)
SEGMENTED NEUTROPHILS ABSOLUTE COUNT: 7.99 K/UL (ref 1.5–8.1)
SEGMENTED NEUTROPHILS ABSOLUTE COUNT: 8.11 K/UL (ref 1.5–8.1)
SEGMENTED NEUTROPHILS ABSOLUTE COUNT: 8.67 K/UL (ref 1.5–8.1)
SEGMENTED NEUTROPHILS ABSOLUTE COUNT: 8.73 K/UL (ref 1.5–8.1)
SEGMENTED NEUTROPHILS ABSOLUTE COUNT: 9.39 K/UL (ref 1.5–8.1)
SODIUM BLD-SCNC: 132 MMOL/L (ref 135–144)
SODIUM BLD-SCNC: 134 MMOL/L (ref 135–144)
SODIUM BLD-SCNC: 134 MMOL/L (ref 135–144)
SODIUM BLD-SCNC: 135 MMOL/L (ref 135–144)
SODIUM BLD-SCNC: 136 MMOL/L (ref 135–144)
SODIUM BLD-SCNC: 137 MMOL/L (ref 135–144)
SODIUM BLD-SCNC: 138 MMOL/L (ref 135–144)
SPECIFIC GRAVITY UA: 1.04 (ref 1–1.03)
SPECIMEN DESCRIPTION: ABNORMAL
SPECIMEN DESCRIPTION: ABNORMAL
TCO2 (CALC), ART: ABNORMAL MMOL/L (ref 22–29)
TCO2 (CALC), ART: NORMAL MMOL/L (ref 22–29)
TOTAL CO2, VENOUS: ABNORMAL MMOL/L (ref 23–30)
TOTAL PROTEIN: 4 G/DL (ref 6.4–8.3)
TOTAL PROTEIN: 5.5 G/DL (ref 6.4–8.3)
TOTAL PROTEIN: 5.5 G/DL (ref 6.4–8.3)
TOTAL PROTEIN: 5.6 G/DL (ref 6.4–8.3)
TOTAL PROTEIN: 7 G/DL (ref 6.4–8.3)
TRICHOMONAS: ABNORMAL
TROPONIN INTERP: ABNORMAL
TROPONIN T: ABNORMAL NG/ML
TROPONIN, HIGH SENSITIVITY: 281 NG/L (ref 0–14)
TURBIDITY: CLEAR
URINE HGB: NEGATIVE
UROBILINOGEN, URINE: NORMAL
WBC # BLD: 10.3 K/UL (ref 3.5–11.3)
WBC # BLD: 10.6 K/UL (ref 3.5–11.3)
WBC # BLD: 10.8 K/UL (ref 3.5–11.3)
WBC # BLD: 11.8 K/UL (ref 3.5–11.3)
WBC # BLD: 11.9 K/UL (ref 3.5–11.3)
WBC # BLD: 22.9 K/UL (ref 3.5–11.3)
WBC # BLD: 3.6 K/UL (ref 3.5–11.3)
WBC # BLD: 4.5 K/UL (ref 3.5–11.3)
WBC # BLD: 4.9 K/UL (ref 3.5–11.3)
WBC # BLD: 5.3 K/UL (ref 3.5–11.3)
WBC # BLD: 6 K/UL (ref 3.5–11.3)
WBC # BLD: 9.6 K/UL (ref 3.5–11.3)
WBC # BLD: ABNORMAL 10*3/UL
WBC UA: ABNORMAL /HPF (ref 0–5)
YEAST: ABNORMAL
ZZ NTE CLEAN UP: ORDERED TEST: NORMAL
ZZ NTE WITH NAME CLEAN UP: SPECIMEN SOURCE: NORMAL

## 2021-01-01 PROCEDURE — 2580000003 HC RX 258: Performed by: NURSE PRACTITIONER

## 2021-01-01 PROCEDURE — 99291 CRITICAL CARE FIRST HOUR: CPT | Performed by: INTERNAL MEDICINE

## 2021-01-01 PROCEDURE — 97110 THERAPEUTIC EXERCISES: CPT

## 2021-01-01 PROCEDURE — 80048 BASIC METABOLIC PNL TOTAL CA: CPT

## 2021-01-01 PROCEDURE — 6370000000 HC RX 637 (ALT 250 FOR IP): Performed by: NURSE PRACTITIONER

## 2021-01-01 PROCEDURE — 6360000002 HC RX W HCPCS: Performed by: STUDENT IN AN ORGANIZED HEALTH CARE EDUCATION/TRAINING PROGRAM

## 2021-01-01 PROCEDURE — 6370000000 HC RX 637 (ALT 250 FOR IP): Performed by: INTERNAL MEDICINE

## 2021-01-01 PROCEDURE — 84145 PROCALCITONIN (PCT): CPT

## 2021-01-01 PROCEDURE — 36415 COLL VENOUS BLD VENIPUNCTURE: CPT

## 2021-01-01 PROCEDURE — 82947 ASSAY GLUCOSE BLOOD QUANT: CPT

## 2021-01-01 PROCEDURE — 94761 N-INVAS EAR/PLS OXIMETRY MLT: CPT

## 2021-01-01 PROCEDURE — 94660 CPAP INITIATION&MGMT: CPT

## 2021-01-01 PROCEDURE — 2060000000 HC ICU INTERMEDIATE R&B

## 2021-01-01 PROCEDURE — 99233 SBSQ HOSP IP/OBS HIGH 50: CPT | Performed by: INTERNAL MEDICINE

## 2021-01-01 PROCEDURE — 6360000002 HC RX W HCPCS: Performed by: NURSE PRACTITIONER

## 2021-01-01 PROCEDURE — 2000000000 HC ICU R&B

## 2021-01-01 PROCEDURE — 6360000002 HC RX W HCPCS: Performed by: INTERNAL MEDICINE

## 2021-01-01 PROCEDURE — 84100 ASSAY OF PHOSPHORUS: CPT

## 2021-01-01 PROCEDURE — 80051 ELECTROLYTE PANEL: CPT

## 2021-01-01 PROCEDURE — 94640 AIRWAY INHALATION TREATMENT: CPT

## 2021-01-01 PROCEDURE — 2500000003 HC RX 250 WO HCPCS: Performed by: STUDENT IN AN ORGANIZED HEALTH CARE EDUCATION/TRAINING PROGRAM

## 2021-01-01 PROCEDURE — 85379 FIBRIN DEGRADATION QUANT: CPT

## 2021-01-01 PROCEDURE — 2700000000 HC OXYGEN THERAPY PER DAY

## 2021-01-01 PROCEDURE — 80053 COMPREHEN METABOLIC PANEL: CPT

## 2021-01-01 PROCEDURE — 99232 SBSQ HOSP IP/OBS MODERATE 35: CPT | Performed by: INTERNAL MEDICINE

## 2021-01-01 PROCEDURE — 85384 FIBRINOGEN ACTIVITY: CPT

## 2021-01-01 PROCEDURE — 37799 UNLISTED PX VASCULAR SURGERY: CPT

## 2021-01-01 PROCEDURE — 82803 BLOOD GASES ANY COMBINATION: CPT

## 2021-01-01 PROCEDURE — 5A1935Z RESPIRATORY VENTILATION, LESS THAN 24 CONSECUTIVE HOURS: ICD-10-PCS | Performed by: INTERNAL MEDICINE

## 2021-01-01 PROCEDURE — 6370000000 HC RX 637 (ALT 250 FOR IP): Performed by: FAMILY MEDICINE

## 2021-01-01 PROCEDURE — 87186 SC STD MICRODIL/AGAR DIL: CPT

## 2021-01-01 PROCEDURE — 71260 CT THORAX DX C+: CPT

## 2021-01-01 PROCEDURE — 82010 KETONE BODYS QUAN: CPT

## 2021-01-01 PROCEDURE — 99285 EMERGENCY DEPT VISIT HI MDM: CPT

## 2021-01-01 PROCEDURE — 83735 ASSAY OF MAGNESIUM: CPT

## 2021-01-01 PROCEDURE — 80076 HEPATIC FUNCTION PANEL: CPT

## 2021-01-01 PROCEDURE — 85025 COMPLETE CBC W/AUTO DIFF WBC: CPT

## 2021-01-01 PROCEDURE — 94664 DEMO&/EVAL PT USE INHALER: CPT

## 2021-01-01 PROCEDURE — 82728 ASSAY OF FERRITIN: CPT

## 2021-01-01 PROCEDURE — 99255 IP/OBS CONSLTJ NEW/EST HI 80: CPT | Performed by: INTERNAL MEDICINE

## 2021-01-01 PROCEDURE — 97530 THERAPEUTIC ACTIVITIES: CPT

## 2021-01-01 PROCEDURE — 86140 C-REACTIVE PROTEIN: CPT

## 2021-01-01 PROCEDURE — 74018 RADEX ABDOMEN 1 VIEW: CPT

## 2021-01-01 PROCEDURE — 87040 BLOOD CULTURE FOR BACTERIA: CPT

## 2021-01-01 PROCEDURE — 71045 X-RAY EXAM CHEST 1 VIEW: CPT

## 2021-01-01 PROCEDURE — 99254 IP/OBS CNSLTJ NEW/EST MOD 60: CPT | Performed by: OTOLARYNGOLOGY

## 2021-01-01 PROCEDURE — 99233 SBSQ HOSP IP/OBS HIGH 50: CPT | Performed by: NURSE PRACTITIONER

## 2021-01-01 PROCEDURE — 82330 ASSAY OF CALCIUM: CPT

## 2021-01-01 PROCEDURE — XW033H5 INTRODUCTION OF TOCILIZUMAB INTO PERIPHERAL VEIN, PERCUTANEOUS APPROACH, NEW TECHNOLOGY GROUP 5: ICD-10-PCS | Performed by: NURSE PRACTITIONER

## 2021-01-01 PROCEDURE — 87635 SARS-COV-2 COVID-19 AMP PRB: CPT

## 2021-01-01 PROCEDURE — 97535 SELF CARE MNGMENT TRAINING: CPT

## 2021-01-01 PROCEDURE — 2580000003 HC RX 258: Performed by: STUDENT IN AN ORGANIZED HEALTH CARE EDUCATION/TRAINING PROGRAM

## 2021-01-01 PROCEDURE — 85055 RETICULATED PLATELET ASSAY: CPT

## 2021-01-01 PROCEDURE — 36600 WITHDRAWAL OF ARTERIAL BLOOD: CPT

## 2021-01-01 PROCEDURE — 84484 ASSAY OF TROPONIN QUANT: CPT

## 2021-01-01 PROCEDURE — 2500000003 HC RX 250 WO HCPCS: Performed by: NURSE PRACTITIONER

## 2021-01-01 PROCEDURE — 2500000003 HC RX 250 WO HCPCS

## 2021-01-01 PROCEDURE — 85027 COMPLETE CBC AUTOMATED: CPT

## 2021-01-01 PROCEDURE — 02HV33Z INSERTION OF INFUSION DEVICE INTO SUPERIOR VENA CAVA, PERCUTANEOUS APPROACH: ICD-10-PCS | Performed by: STUDENT IN AN ORGANIZED HEALTH CARE EDUCATION/TRAINING PROGRAM

## 2021-01-01 PROCEDURE — APPSS45 APP SPLIT SHARED TIME 31-45 MINUTES: Performed by: NURSE PRACTITIONER

## 2021-01-01 PROCEDURE — 87086 URINE CULTURE/COLONY COUNT: CPT

## 2021-01-01 PROCEDURE — 93970 EXTREMITY STUDY: CPT

## 2021-01-01 PROCEDURE — 73562 X-RAY EXAM OF KNEE 3: CPT

## 2021-01-01 PROCEDURE — 04HY32Z INSERTION OF MONITORING DEVICE INTO LOWER ARTERY, PERCUTANEOUS APPROACH: ICD-10-PCS | Performed by: STUDENT IN AN ORGANIZED HEALTH CARE EDUCATION/TRAINING PROGRAM

## 2021-01-01 PROCEDURE — 97166 OT EVAL MOD COMPLEX 45 MIN: CPT

## 2021-01-01 PROCEDURE — 83605 ASSAY OF LACTIC ACID: CPT

## 2021-01-01 PROCEDURE — 6370000000 HC RX 637 (ALT 250 FOR IP): Performed by: STUDENT IN AN ORGANIZED HEALTH CARE EDUCATION/TRAINING PROGRAM

## 2021-01-01 PROCEDURE — XW033E5 INTRODUCTION OF REMDESIVIR ANTI-INFECTIVE INTO PERIPHERAL VEIN, PERCUTANEOUS APPROACH, NEW TECHNOLOGY GROUP 5: ICD-10-PCS | Performed by: STUDENT IN AN ORGANIZED HEALTH CARE EDUCATION/TRAINING PROGRAM

## 2021-01-01 PROCEDURE — 31500 INSERT EMERGENCY AIRWAY: CPT | Performed by: ANESTHESIOLOGY

## 2021-01-01 PROCEDURE — 93010 ELECTROCARDIOGRAM REPORT: CPT | Performed by: INTERNAL MEDICINE

## 2021-01-01 PROCEDURE — 86038 ANTINUCLEAR ANTIBODIES: CPT

## 2021-01-01 PROCEDURE — 76937 US GUIDE VASCULAR ACCESS: CPT

## 2021-01-01 PROCEDURE — 6360000002 HC RX W HCPCS

## 2021-01-01 PROCEDURE — 8E0ZXY6 ISOLATION: ICD-10-PCS | Performed by: STUDENT IN AN ORGANIZED HEALTH CARE EDUCATION/TRAINING PROGRAM

## 2021-01-01 PROCEDURE — 82248 BILIRUBIN DIRECT: CPT

## 2021-01-01 PROCEDURE — 85014 HEMATOCRIT: CPT

## 2021-01-01 PROCEDURE — 97162 PT EVAL MOD COMPLEX 30 MIN: CPT

## 2021-01-01 PROCEDURE — 86225 DNA ANTIBODY NATIVE: CPT

## 2021-01-01 PROCEDURE — 2580000003 HC RX 258: Performed by: INTERNAL MEDICINE

## 2021-01-01 PROCEDURE — APPSS30 APP SPLIT SHARED TIME 16-30 MINUTES: Performed by: NURSE PRACTITIONER

## 2021-01-01 PROCEDURE — 83880 ASSAY OF NATRIURETIC PEPTIDE: CPT

## 2021-01-01 PROCEDURE — 81001 URINALYSIS AUTO W/SCOPE: CPT

## 2021-01-01 PROCEDURE — 0BH17EZ INSERTION OF ENDOTRACHEAL AIRWAY INTO TRACHEA, VIA NATURAL OR ARTIFICIAL OPENING: ICD-10-PCS | Performed by: INTERNAL MEDICINE

## 2021-01-01 PROCEDURE — 93005 ELECTROCARDIOGRAM TRACING: CPT | Performed by: STUDENT IN AN ORGANIZED HEALTH CARE EDUCATION/TRAINING PROGRAM

## 2021-01-01 PROCEDURE — 87088 URINE BACTERIA CULTURE: CPT

## 2021-01-01 PROCEDURE — 82565 ASSAY OF CREATININE: CPT

## 2021-01-01 PROCEDURE — 84520 ASSAY OF UREA NITROGEN: CPT

## 2021-01-01 PROCEDURE — 97116 GAIT TRAINING THERAPY: CPT

## 2021-01-01 PROCEDURE — 1200000000 HC SEMI PRIVATE

## 2021-01-01 PROCEDURE — 6360000004 HC RX CONTRAST MEDICATION: Performed by: INTERNAL MEDICINE

## 2021-01-01 PROCEDURE — 80069 RENAL FUNCTION PANEL: CPT

## 2021-01-01 PROCEDURE — 99231 SBSQ HOSP IP/OBS SF/LOW 25: CPT | Performed by: INTERNAL MEDICINE

## 2021-01-01 PROCEDURE — 99222 1ST HOSP IP/OBS MODERATE 55: CPT | Performed by: NURSE PRACTITIONER

## 2021-01-01 RX ORDER — DEXTROSE MONOHYDRATE 50 MG/ML
100 INJECTION, SOLUTION INTRAVENOUS PRN
Status: DISCONTINUED | OUTPATIENT
Start: 2021-01-01 | End: 2021-01-01 | Stop reason: HOSPADM

## 2021-01-01 RX ORDER — POTASSIUM CHLORIDE 7.45 MG/ML
10 INJECTION INTRAVENOUS PRN
Status: DISCONTINUED | OUTPATIENT
Start: 2021-01-01 | End: 2021-01-01 | Stop reason: HOSPADM

## 2021-01-01 RX ORDER — MINERAL OIL AND WHITE PETROLATUM 150; 830 MG/G; MG/G
OINTMENT OPHTHALMIC PRN
Status: DISCONTINUED | OUTPATIENT
Start: 2021-01-01 | End: 2021-01-01 | Stop reason: HOSPADM

## 2021-01-01 RX ORDER — SODIUM CHLORIDE 0.9 % (FLUSH) 0.9 %
10 SYRINGE (ML) INJECTION PRN
Status: DISCONTINUED | OUTPATIENT
Start: 2021-01-01 | End: 2021-01-01 | Stop reason: HOSPADM

## 2021-01-01 RX ORDER — GUAIFENESIN DEXTROMETHORPHAN HYDROBROMIDE ORAL SOLUTION 10; 100 MG/5ML; MG/5ML
10 SOLUTION ORAL EVERY 4 HOURS PRN
Status: DISCONTINUED | OUTPATIENT
Start: 2021-01-01 | End: 2021-01-01

## 2021-01-01 RX ORDER — FUROSEMIDE 10 MG/ML
20 INJECTION INTRAMUSCULAR; INTRAVENOUS ONCE
Status: COMPLETED | OUTPATIENT
Start: 2021-01-01 | End: 2021-01-01

## 2021-01-01 RX ORDER — PROPOFOL 10 MG/ML
INJECTION, EMULSION INTRAVENOUS
Status: DISCONTINUED
Start: 2021-01-01 | End: 2021-01-01 | Stop reason: HOSPADM

## 2021-01-01 RX ORDER — FENTANYL CITRATE 50 UG/ML
50 INJECTION, SOLUTION INTRAMUSCULAR; INTRAVENOUS
Status: DISCONTINUED | OUTPATIENT
Start: 2021-01-01 | End: 2021-01-01 | Stop reason: HOSPADM

## 2021-01-01 RX ORDER — DEXTROSE MONOHYDRATE 25 G/50ML
12.5 INJECTION, SOLUTION INTRAVENOUS PRN
Status: DISCONTINUED | OUTPATIENT
Start: 2021-01-01 | End: 2021-01-01

## 2021-01-01 RX ORDER — POTASSIUM CHLORIDE 7.45 MG/ML
10 INJECTION INTRAVENOUS PRN
Status: DISCONTINUED | OUTPATIENT
Start: 2021-01-01 | End: 2021-01-01

## 2021-01-01 RX ORDER — MORPHINE SULFATE 4 MG/ML
4 INJECTION, SOLUTION INTRAMUSCULAR; INTRAVENOUS ONCE
Status: DISCONTINUED | OUTPATIENT
Start: 2021-01-01 | End: 2021-01-01

## 2021-01-01 RX ORDER — DEXTROSE MONOHYDRATE 25 G/50ML
12.5 INJECTION, SOLUTION INTRAVENOUS PRN
Status: DISCONTINUED | OUTPATIENT
Start: 2021-01-01 | End: 2021-01-01 | Stop reason: SDUPTHER

## 2021-01-01 RX ORDER — POTASSIUM CHLORIDE 20 MEQ/1
20 TABLET, EXTENDED RELEASE ORAL ONCE
Status: COMPLETED | OUTPATIENT
Start: 2021-01-01 | End: 2021-01-01

## 2021-01-01 RX ORDER — GUAIFENESIN DEXTROMETHORPHAN HYDROBROMIDE ORAL SOLUTION 10; 100 MG/5ML; MG/5ML
10 SOLUTION ORAL EVERY 4 HOURS PRN
Status: DISCONTINUED | OUTPATIENT
Start: 2021-01-01 | End: 2021-01-01 | Stop reason: HOSPADM

## 2021-01-01 RX ORDER — NICOTINE POLACRILEX 4 MG
15 LOZENGE BUCCAL PRN
Status: DISCONTINUED | OUTPATIENT
Start: 2021-01-01 | End: 2021-01-01 | Stop reason: SDUPTHER

## 2021-01-01 RX ORDER — DEXAMETHASONE SODIUM PHOSPHATE 10 MG/ML
10 INJECTION INTRAMUSCULAR; INTRAVENOUS DAILY
Status: COMPLETED | OUTPATIENT
Start: 2021-01-01 | End: 2021-01-01

## 2021-01-01 RX ORDER — ALBUTEROL SULFATE 90 UG/1
2 AEROSOL, METERED RESPIRATORY (INHALATION) 4 TIMES DAILY
Status: DISCONTINUED | OUTPATIENT
Start: 2021-01-01 | End: 2021-01-01

## 2021-01-01 RX ORDER — METOPROLOL TARTRATE 5 MG/5ML
5 INJECTION INTRAVENOUS ONCE
Status: COMPLETED | OUTPATIENT
Start: 2021-01-01 | End: 2021-01-01

## 2021-01-01 RX ORDER — VITAMIN B COMPLEX
2000 TABLET ORAL DAILY
Status: DISCONTINUED | OUTPATIENT
Start: 2021-01-01 | End: 2021-01-01

## 2021-01-01 RX ORDER — INSULIN GLARGINE 100 [IU]/ML
25 INJECTION, SOLUTION SUBCUTANEOUS 2 TIMES DAILY
Status: DISCONTINUED | OUTPATIENT
Start: 2021-01-01 | End: 2021-01-01 | Stop reason: HOSPADM

## 2021-01-01 RX ORDER — DEXTROSE MONOHYDRATE 25 G/50ML
12.5 INJECTION, SOLUTION INTRAVENOUS PRN
Status: DISCONTINUED | OUTPATIENT
Start: 2021-01-01 | End: 2021-01-01 | Stop reason: HOSPADM

## 2021-01-01 RX ORDER — ALBUTEROL SULFATE 90 UG/1
2 AEROSOL, METERED RESPIRATORY (INHALATION)
Status: DISCONTINUED | OUTPATIENT
Start: 2021-01-01 | End: 2021-01-01

## 2021-01-01 RX ORDER — METOPROLOL TARTRATE 5 MG/5ML
INJECTION INTRAVENOUS
Status: COMPLETED
Start: 2021-01-01 | End: 2021-01-01

## 2021-01-01 RX ORDER — SODIUM CHLORIDE 450 MG/100ML
INJECTION, SOLUTION INTRAVENOUS CONTINUOUS
Status: DISCONTINUED | OUTPATIENT
Start: 2021-01-01 | End: 2021-01-01

## 2021-01-01 RX ORDER — FENTANYL CITRATE 50 UG/ML
50 INJECTION, SOLUTION INTRAMUSCULAR; INTRAVENOUS ONCE
Status: COMPLETED | OUTPATIENT
Start: 2021-01-01 | End: 2021-01-01

## 2021-01-01 RX ORDER — DEXTROSE MONOHYDRATE 25 G/50ML
25 INJECTION, SOLUTION INTRAVENOUS ONCE
Status: DISCONTINUED | OUTPATIENT
Start: 2021-01-01 | End: 2021-01-01 | Stop reason: HOSPADM

## 2021-01-01 RX ORDER — ACETAMINOPHEN 325 MG/1
650 TABLET ORAL ONCE
Status: COMPLETED | OUTPATIENT
Start: 2021-01-01 | End: 2021-01-01

## 2021-01-01 RX ORDER — ALBUTEROL SULFATE 90 UG/1
2 AEROSOL, METERED RESPIRATORY (INHALATION) EVERY 4 HOURS PRN
Status: DISCONTINUED | OUTPATIENT
Start: 2021-01-01 | End: 2021-01-01 | Stop reason: HOSPADM

## 2021-01-01 RX ORDER — INSULIN GLARGINE 100 [IU]/ML
40 INJECTION, SOLUTION SUBCUTANEOUS 2 TIMES DAILY
Status: DISCONTINUED | OUTPATIENT
Start: 2021-01-01 | End: 2021-01-01

## 2021-01-01 RX ORDER — MAGNESIUM SULFATE 1 G/100ML
1000 INJECTION INTRAVENOUS PRN
Status: DISCONTINUED | OUTPATIENT
Start: 2021-01-01 | End: 2021-01-01

## 2021-01-01 RX ORDER — BUSPIRONE HYDROCHLORIDE 10 MG/1
10 TABLET ORAL 3 TIMES DAILY
Status: DISCONTINUED | OUTPATIENT
Start: 2021-01-01 | End: 2021-01-01 | Stop reason: HOSPADM

## 2021-01-01 RX ORDER — FAMOTIDINE 20 MG/1
20 TABLET, FILM COATED ORAL DAILY
Status: DISCONTINUED | OUTPATIENT
Start: 2021-01-01 | End: 2021-01-01 | Stop reason: HOSPADM

## 2021-01-01 RX ORDER — LANOLIN ALCOHOL/MO/W.PET/CERES
325 CREAM (GRAM) TOPICAL
Status: DISCONTINUED | OUTPATIENT
Start: 2021-01-01 | End: 2021-01-01 | Stop reason: HOSPADM

## 2021-01-01 RX ORDER — ASCORBIC ACID 500 MG
500 TABLET ORAL 2 TIMES DAILY
Status: DISCONTINUED | OUTPATIENT
Start: 2021-01-01 | End: 2021-01-01

## 2021-01-01 RX ORDER — DEXTROSE AND SODIUM CHLORIDE 5; .45 G/100ML; G/100ML
INJECTION, SOLUTION INTRAVENOUS CONTINUOUS PRN
Status: DISCONTINUED | OUTPATIENT
Start: 2021-01-01 | End: 2021-01-01

## 2021-01-01 RX ORDER — 0.9 % SODIUM CHLORIDE 0.9 %
500 INTRAVENOUS SOLUTION INTRAVENOUS ONCE
Status: COMPLETED | OUTPATIENT
Start: 2021-01-01 | End: 2021-01-01

## 2021-01-01 RX ORDER — ACETAMINOPHEN 325 MG/1
650 TABLET ORAL EVERY 6 HOURS PRN
Status: DISCONTINUED | OUTPATIENT
Start: 2021-01-01 | End: 2021-01-01

## 2021-01-01 RX ORDER — CALCIUM CHLORIDE 100 MG/ML
INJECTION INTRAVENOUS; INTRAVENTRICULAR
Status: COMPLETED
Start: 2021-01-01 | End: 2021-01-01

## 2021-01-01 RX ORDER — FUROSEMIDE 10 MG/ML
INJECTION INTRAMUSCULAR; INTRAVENOUS
Status: DISPENSED
Start: 2021-01-01 | End: 2021-01-01

## 2021-01-01 RX ORDER — ACETAMINOPHEN 650 MG/1
650 SUPPOSITORY RECTAL EVERY 6 HOURS PRN
Status: DISCONTINUED | OUTPATIENT
Start: 2021-01-01 | End: 2021-01-01

## 2021-01-01 RX ORDER — NOREPINEPHRINE BIT/0.9 % NACL 16MG/250ML
2-100 INFUSION BOTTLE (ML) INTRAVENOUS CONTINUOUS
Status: DISCONTINUED | OUTPATIENT
Start: 2021-01-01 | End: 2021-01-01 | Stop reason: HOSPADM

## 2021-01-01 RX ORDER — ALBUTEROL SULFATE 2.5 MG/3ML
2.5 SOLUTION RESPIRATORY (INHALATION)
Status: DISCONTINUED | OUTPATIENT
Start: 2021-01-01 | End: 2021-01-01

## 2021-01-01 RX ORDER — POTASSIUM CHLORIDE 20 MEQ/1
40 TABLET, EXTENDED RELEASE ORAL PRN
Status: DISCONTINUED | OUTPATIENT
Start: 2021-01-01 | End: 2021-01-01 | Stop reason: HOSPADM

## 2021-01-01 RX ORDER — INSULIN GLARGINE 100 [IU]/ML
50 INJECTION, SOLUTION SUBCUTANEOUS 2 TIMES DAILY
Status: DISCONTINUED | OUTPATIENT
Start: 2021-01-01 | End: 2021-01-01

## 2021-01-01 RX ORDER — SODIUM CHLORIDE 9 MG/ML
25 INJECTION, SOLUTION INTRAVENOUS PRN
Status: DISCONTINUED | OUTPATIENT
Start: 2021-01-01 | End: 2021-01-01 | Stop reason: HOSPADM

## 2021-01-01 RX ORDER — INSULIN GLARGINE 100 [IU]/ML
10 INJECTION, SOLUTION SUBCUTANEOUS 2 TIMES DAILY
Status: DISCONTINUED | OUTPATIENT
Start: 2021-01-01 | End: 2021-01-01

## 2021-01-01 RX ORDER — 0.9 % SODIUM CHLORIDE 0.9 %
30 INTRAVENOUS SOLUTION INTRAVENOUS PRN
Status: DISCONTINUED | OUTPATIENT
Start: 2021-01-01 | End: 2021-01-01

## 2021-01-01 RX ORDER — NOREPINEPHRINE BIT/0.9 % NACL 16MG/250ML
INFUSION BOTTLE (ML) INTRAVENOUS
Status: COMPLETED
Start: 2021-01-01 | End: 2021-01-01

## 2021-01-01 RX ORDER — FLUTICASONE PROPIONATE 50 MCG
1 SPRAY, SUSPENSION (ML) NASAL DAILY
Status: DISCONTINUED | OUTPATIENT
Start: 2021-01-01 | End: 2021-01-01

## 2021-01-01 RX ORDER — CETIRIZINE HYDROCHLORIDE 10 MG/1
10 TABLET ORAL DAILY
Status: DISCONTINUED | OUTPATIENT
Start: 2021-01-01 | End: 2021-01-01 | Stop reason: HOSPADM

## 2021-01-01 RX ORDER — FUROSEMIDE 10 MG/ML
20 INJECTION INTRAMUSCULAR; INTRAVENOUS DAILY
Status: DISCONTINUED | OUTPATIENT
Start: 2021-01-01 | End: 2021-01-01 | Stop reason: HOSPADM

## 2021-01-01 RX ORDER — INSULIN GLARGINE 100 [IU]/ML
20 INJECTION, SOLUTION SUBCUTANEOUS 2 TIMES DAILY
Status: DISCONTINUED | OUTPATIENT
Start: 2021-01-01 | End: 2021-01-01

## 2021-01-01 RX ORDER — FUROSEMIDE 10 MG/ML
20 INJECTION INTRAMUSCULAR; INTRAVENOUS DAILY
Status: COMPLETED | OUTPATIENT
Start: 2021-01-01 | End: 2021-01-01

## 2021-01-01 RX ORDER — ALPRAZOLAM 0.25 MG/1
0.25 TABLET ORAL 3 TIMES DAILY PRN
Status: DISCONTINUED | OUTPATIENT
Start: 2021-01-01 | End: 2021-01-01 | Stop reason: HOSPADM

## 2021-01-01 RX ORDER — PROPOFOL 10 MG/ML
10 INJECTION, EMULSION INTRAVENOUS
Status: DISCONTINUED | OUTPATIENT
Start: 2021-01-01 | End: 2021-01-01 | Stop reason: HOSPADM

## 2021-01-01 RX ORDER — MAGNESIUM SULFATE 1 G/100ML
1000 INJECTION INTRAVENOUS PRN
Status: DISCONTINUED | OUTPATIENT
Start: 2021-01-01 | End: 2021-01-01 | Stop reason: HOSPADM

## 2021-01-01 RX ORDER — SODIUM CHLORIDE 0.9 % (FLUSH) 0.9 %
5-40 SYRINGE (ML) INJECTION EVERY 12 HOURS SCHEDULED
Status: DISCONTINUED | OUTPATIENT
Start: 2021-01-01 | End: 2021-01-01 | Stop reason: HOSPADM

## 2021-01-01 RX ORDER — DEXTROSE MONOHYDRATE 50 MG/ML
100 INJECTION, SOLUTION INTRAVENOUS PRN
Status: DISCONTINUED | OUTPATIENT
Start: 2021-01-01 | End: 2021-01-01 | Stop reason: SDUPTHER

## 2021-01-01 RX ORDER — CELECOXIB 50 MG/1
50 CAPSULE ORAL 2 TIMES DAILY
Status: DISCONTINUED | OUTPATIENT
Start: 2021-01-01 | End: 2021-01-01 | Stop reason: HOSPADM

## 2021-01-01 RX ORDER — DEXAMETHASONE SODIUM PHOSPHATE 10 MG/ML
6 INJECTION INTRAMUSCULAR; INTRAVENOUS EVERY 24 HOURS
Status: DISCONTINUED | OUTPATIENT
Start: 2021-01-01 | End: 2021-01-01

## 2021-01-01 RX ORDER — ONDANSETRON 2 MG/ML
4 INJECTION INTRAMUSCULAR; INTRAVENOUS EVERY 6 HOURS PRN
Status: DISCONTINUED | OUTPATIENT
Start: 2021-01-01 | End: 2021-01-01 | Stop reason: HOSPADM

## 2021-01-01 RX ORDER — IPRATROPIUM BROMIDE AND ALBUTEROL SULFATE 2.5; .5 MG/3ML; MG/3ML
1 SOLUTION RESPIRATORY (INHALATION)
Status: DISCONTINUED | OUTPATIENT
Start: 2021-01-01 | End: 2021-01-01

## 2021-01-01 RX ORDER — INSULIN GLARGINE 100 [IU]/ML
35 INJECTION, SOLUTION SUBCUTANEOUS 2 TIMES DAILY
Status: DISCONTINUED | OUTPATIENT
Start: 2021-01-01 | End: 2021-01-01

## 2021-01-01 RX ORDER — INSULIN GLARGINE 100 [IU]/ML
55 INJECTION, SOLUTION SUBCUTANEOUS 2 TIMES DAILY
Status: DISCONTINUED | OUTPATIENT
Start: 2021-01-01 | End: 2021-01-01

## 2021-01-01 RX ORDER — DEXTROSE MONOHYDRATE 50 MG/ML
100 INJECTION, SOLUTION INTRAVENOUS PRN
Status: DISCONTINUED | OUTPATIENT
Start: 2021-01-01 | End: 2021-01-01

## 2021-01-01 RX ORDER — FUROSEMIDE 10 MG/ML
40 INJECTION INTRAMUSCULAR; INTRAVENOUS ONCE
Status: COMPLETED | OUTPATIENT
Start: 2021-01-01 | End: 2021-01-01

## 2021-01-01 RX ORDER — ASPIRIN 81 MG/1
81 TABLET ORAL DAILY
Status: DISCONTINUED | OUTPATIENT
Start: 2021-01-01 | End: 2021-01-01 | Stop reason: HOSPADM

## 2021-01-01 RX ORDER — TRAMADOL HYDROCHLORIDE 50 MG/1
50 TABLET ORAL EVERY 4 HOURS PRN
Status: DISCONTINUED | OUTPATIENT
Start: 2021-01-01 | End: 2021-01-01 | Stop reason: HOSPADM

## 2021-01-01 RX ORDER — ONDANSETRON 4 MG/1
4 TABLET, ORALLY DISINTEGRATING ORAL EVERY 8 HOURS PRN
Status: DISCONTINUED | OUTPATIENT
Start: 2021-01-01 | End: 2021-01-01 | Stop reason: HOSPADM

## 2021-01-01 RX ORDER — NICOTINE POLACRILEX 4 MG
15 LOZENGE BUCCAL PRN
Status: DISCONTINUED | OUTPATIENT
Start: 2021-01-01 | End: 2021-01-01 | Stop reason: HOSPADM

## 2021-01-01 RX ORDER — GUAIFENESIN DEXTROMETHORPHAN HYDROBROMIDE ORAL SOLUTION 10; 100 MG/5ML; MG/5ML
5 SOLUTION ORAL EVERY 4 HOURS PRN
Status: DISCONTINUED | OUTPATIENT
Start: 2021-01-01 | End: 2021-01-01

## 2021-01-01 RX ORDER — DEXAMETHASONE SODIUM PHOSPHATE 10 MG/ML
6 INJECTION INTRAMUSCULAR; INTRAVENOUS EVERY 24 HOURS
Status: DISCONTINUED | OUTPATIENT
Start: 2021-01-01 | End: 2021-01-01 | Stop reason: SDUPTHER

## 2021-01-01 RX ORDER — AMOXICILLIN AND CLAVULANATE POTASSIUM 875; 125 MG/1; MG/1
1 TABLET, FILM COATED ORAL EVERY 12 HOURS SCHEDULED
Status: DISCONTINUED | OUTPATIENT
Start: 2021-01-01 | End: 2021-01-01

## 2021-01-01 RX ADMIN — FLUTICASONE PROPIONATE 1 SPRAY: 50 SPRAY, METERED NASAL at 11:23

## 2021-01-01 RX ADMIN — FUROSEMIDE 40 MG: 10 INJECTION, SOLUTION INTRAMUSCULAR; INTRAVENOUS at 15:40

## 2021-01-01 RX ADMIN — DEXAMETHASONE SODIUM PHOSPHATE 10 MG: 10 INJECTION INTRAMUSCULAR; INTRAVENOUS at 08:52

## 2021-01-01 RX ADMIN — ALBUTEROL SULFATE 2 PUFF: 90 AEROSOL, METERED RESPIRATORY (INHALATION) at 20:21

## 2021-01-01 RX ADMIN — INSULIN LISPRO 9 UNITS: 100 INJECTION, SOLUTION INTRAVENOUS; SUBCUTANEOUS at 19:29

## 2021-01-01 RX ADMIN — DEXTROSE AND SODIUM CHLORIDE: 5; 450 INJECTION, SOLUTION INTRAVENOUS at 02:23

## 2021-01-01 RX ADMIN — FERROUS SULFATE TAB EC 325 MG (65 MG FE EQUIVALENT) 325 MG: 325 (65 FE) TABLET DELAYED RESPONSE at 08:35

## 2021-01-01 RX ADMIN — Medication 2000 UNITS: at 11:21

## 2021-01-01 RX ADMIN — DEXAMETHASONE SODIUM PHOSPHATE 10 MG: 10 INJECTION INTRAMUSCULAR; INTRAVENOUS at 11:42

## 2021-01-01 RX ADMIN — INSULIN LISPRO 5 UNITS: 100 INJECTION, SOLUTION INTRAVENOUS; SUBCUTANEOUS at 21:00

## 2021-01-01 RX ADMIN — CETIRIZINE HYDROCHLORIDE 10 MG: 10 TABLET ORAL at 08:16

## 2021-01-01 RX ADMIN — Medication 2000 UNITS: at 08:52

## 2021-01-01 RX ADMIN — ALPRAZOLAM 0.25 MG: 0.25 TABLET ORAL at 20:26

## 2021-01-01 RX ADMIN — GUAIFENESIN DEXTROMETHORPHAN HYDROBROMIDE ORAL SOLUTION 10 ML: 200; 20 SOLUTION ORAL at 23:36

## 2021-01-01 RX ADMIN — METOPROLOL TARTRATE 5 MG: 1 INJECTION, SOLUTION INTRAVENOUS at 00:00

## 2021-01-01 RX ADMIN — FUROSEMIDE 20 MG: 10 INJECTION, SOLUTION INTRAMUSCULAR; INTRAVENOUS at 08:28

## 2021-01-01 RX ADMIN — INSULIN LISPRO 9 UNITS: 100 INJECTION, SOLUTION INTRAVENOUS; SUBCUTANEOUS at 17:48

## 2021-01-01 RX ADMIN — OXYCODONE HYDROCHLORIDE AND ACETAMINOPHEN 500 MG: 500 TABLET ORAL at 08:00

## 2021-01-01 RX ADMIN — TRAMADOL HYDROCHLORIDE 50 MG: 50 TABLET, COATED ORAL at 10:55

## 2021-01-01 RX ADMIN — ALBUTEROL SULFATE 2 PUFF: 90 AEROSOL, METERED RESPIRATORY (INHALATION) at 12:22

## 2021-01-01 RX ADMIN — DEXTROMETHORPHAN HYDROBROMIDE AND GUAIFENESIN 5 ML: 20; 200 LIQUID ORAL at 09:03

## 2021-01-01 RX ADMIN — FAMOTIDINE 20 MG: 20 TABLET, FILM COATED ORAL at 09:02

## 2021-01-01 RX ADMIN — IPRATROPIUM BROMIDE 2 PUFF: 17 AEROSOL, METERED RESPIRATORY (INHALATION) at 21:04

## 2021-01-01 RX ADMIN — DEXAMETHASONE SODIUM PHOSPHATE 6 MG: 10 INJECTION INTRAMUSCULAR; INTRAVENOUS at 23:15

## 2021-01-01 RX ADMIN — OXYCODONE HYDROCHLORIDE AND ACETAMINOPHEN 500 MG: 500 TABLET ORAL at 08:35

## 2021-01-01 RX ADMIN — CELECOXIB 50 MG: 50 CAPSULE ORAL at 21:46

## 2021-01-01 RX ADMIN — SODIUM CHLORIDE, PRESERVATIVE FREE 10 ML: 5 INJECTION INTRAVENOUS at 08:00

## 2021-01-01 RX ADMIN — SODIUM CHLORIDE: 4.5 INJECTION, SOLUTION INTRAVENOUS at 02:09

## 2021-01-01 RX ADMIN — OXYCODONE HYDROCHLORIDE AND ACETAMINOPHEN 500 MG: 500 TABLET ORAL at 08:33

## 2021-01-01 RX ADMIN — INSULIN LISPRO 3 UNITS: 100 INJECTION, SOLUTION INTRAVENOUS; SUBCUTANEOUS at 10:05

## 2021-01-01 RX ADMIN — INSULIN LISPRO 6 UNITS: 100 INJECTION, SOLUTION INTRAVENOUS; SUBCUTANEOUS at 13:00

## 2021-01-01 RX ADMIN — ALBUTEROL SULFATE 2 PUFF: 90 AEROSOL, METERED RESPIRATORY (INHALATION) at 15:55

## 2021-01-01 RX ADMIN — TRAMADOL HYDROCHLORIDE 50 MG: 50 TABLET, COATED ORAL at 04:19

## 2021-01-01 RX ADMIN — CEFTAROLINE FOSAMIL 600 MG: 600 POWDER, FOR SOLUTION INTRAVENOUS at 16:45

## 2021-01-01 RX ADMIN — FAMOTIDINE 20 MG: 20 TABLET, FILM COATED ORAL at 08:25

## 2021-01-01 RX ADMIN — ENOXAPARIN SODIUM 30 MG: 30 INJECTION SUBCUTANEOUS at 21:09

## 2021-01-01 RX ADMIN — INSULIN LISPRO 3 UNITS: 100 INJECTION, SOLUTION INTRAVENOUS; SUBCUTANEOUS at 18:08

## 2021-01-01 RX ADMIN — SODIUM CHLORIDE, PRESERVATIVE FREE 10 ML: 5 INJECTION INTRAVENOUS at 23:25

## 2021-01-01 RX ADMIN — INSULIN LISPRO 7 UNITS: 100 INJECTION, SOLUTION INTRAVENOUS; SUBCUTANEOUS at 20:45

## 2021-01-01 RX ADMIN — IPRATROPIUM BROMIDE 2 PUFF: 17 AEROSOL, METERED RESPIRATORY (INHALATION) at 12:04

## 2021-01-01 RX ADMIN — IPRATROPIUM BROMIDE 2 PUFF: 17 AEROSOL, METERED RESPIRATORY (INHALATION) at 16:49

## 2021-01-01 RX ADMIN — ALPRAZOLAM 0.25 MG: 0.25 TABLET ORAL at 09:25

## 2021-01-01 RX ADMIN — SODIUM CHLORIDE 13.14 UNITS/HR: 9 INJECTION, SOLUTION INTRAVENOUS at 00:56

## 2021-01-01 RX ADMIN — IPRATROPIUM BROMIDE 2 PUFF: 17 AEROSOL, METERED RESPIRATORY (INHALATION) at 09:14

## 2021-01-01 RX ADMIN — OXYCODONE HYDROCHLORIDE AND ACETAMINOPHEN 500 MG: 500 TABLET ORAL at 20:04

## 2021-01-01 RX ADMIN — INSULIN GLARGINE 50 UNITS: 100 INJECTION, SOLUTION SUBCUTANEOUS at 23:23

## 2021-01-01 RX ADMIN — SODIUM CHLORIDE, PRESERVATIVE FREE 10 ML: 5 INJECTION INTRAVENOUS at 09:58

## 2021-01-01 RX ADMIN — Medication 2000 UNITS: at 08:33

## 2021-01-01 RX ADMIN — IPRATROPIUM BROMIDE 2 PUFF: 17 AEROSOL, METERED RESPIRATORY (INHALATION) at 12:23

## 2021-01-01 RX ADMIN — INSULIN LISPRO 12 UNITS: 100 INJECTION, SOLUTION INTRAVENOUS; SUBCUTANEOUS at 09:16

## 2021-01-01 RX ADMIN — FENTANYL CITRATE 50 MCG: 50 INJECTION, SOLUTION INTRAMUSCULAR; INTRAVENOUS at 20:26

## 2021-01-01 RX ADMIN — ALBUTEROL SULFATE 2 PUFF: 90 AEROSOL, METERED RESPIRATORY (INHALATION) at 21:04

## 2021-01-01 RX ADMIN — FAMOTIDINE 20 MG: 20 TABLET, FILM COATED ORAL at 08:16

## 2021-01-01 RX ADMIN — OXYCODONE HYDROCHLORIDE AND ACETAMINOPHEN 500 MG: 500 TABLET ORAL at 08:16

## 2021-01-01 RX ADMIN — Medication 2000 UNITS: at 08:47

## 2021-01-01 RX ADMIN — POTASSIUM CHLORIDE 20 MEQ: 1500 TABLET, EXTENDED RELEASE ORAL at 13:04

## 2021-01-01 RX ADMIN — INSULIN LISPRO 3 UNITS: 100 INJECTION, SOLUTION INTRAVENOUS; SUBCUTANEOUS at 12:45

## 2021-01-01 RX ADMIN — ENOXAPARIN SODIUM 30 MG: 30 INJECTION SUBCUTANEOUS at 08:14

## 2021-01-01 RX ADMIN — ALBUTEROL SULFATE 2 PUFF: 90 AEROSOL, METERED RESPIRATORY (INHALATION) at 11:07

## 2021-01-01 RX ADMIN — INSULIN LISPRO 6 UNITS: 100 INJECTION, SOLUTION INTRAVENOUS; SUBCUTANEOUS at 12:12

## 2021-01-01 RX ADMIN — CETIRIZINE HYDROCHLORIDE 10 MG: 10 TABLET ORAL at 11:42

## 2021-01-01 RX ADMIN — ALPRAZOLAM 0.25 MG: 0.25 TABLET ORAL at 21:16

## 2021-01-01 RX ADMIN — INSULIN LISPRO 6 UNITS: 100 INJECTION, SOLUTION INTRAVENOUS; SUBCUTANEOUS at 09:15

## 2021-01-01 RX ADMIN — SODIUM CHLORIDE, PRESERVATIVE FREE 10 ML: 5 INJECTION INTRAVENOUS at 08:36

## 2021-01-01 RX ADMIN — INSULIN LISPRO 3 UNITS: 100 INJECTION, SOLUTION INTRAVENOUS; SUBCUTANEOUS at 17:00

## 2021-01-01 RX ADMIN — SODIUM CHLORIDE, PRESERVATIVE FREE 10 ML: 5 INJECTION INTRAVENOUS at 19:32

## 2021-01-01 RX ADMIN — ACETAMINOPHEN 650 MG: 325 TABLET ORAL at 22:59

## 2021-01-01 RX ADMIN — INSULIN GLARGINE 50 UNITS: 100 INJECTION, SOLUTION SUBCUTANEOUS at 09:08

## 2021-01-01 RX ADMIN — ALPRAZOLAM 0.25 MG: 0.25 TABLET ORAL at 14:40

## 2021-01-01 RX ADMIN — FAMOTIDINE 20 MG: 20 TABLET, FILM COATED ORAL at 08:35

## 2021-01-01 RX ADMIN — ENOXAPARIN SODIUM 30 MG: 30 INJECTION SUBCUTANEOUS at 20:27

## 2021-01-01 RX ADMIN — ENOXAPARIN SODIUM 30 MG: 30 INJECTION SUBCUTANEOUS at 20:45

## 2021-01-01 RX ADMIN — FAMOTIDINE 20 MG: 20 TABLET, FILM COATED ORAL at 08:53

## 2021-01-01 RX ADMIN — EPINEPHRINE 5 MCG/MIN: 1 INJECTION INTRAMUSCULAR; INTRAVENOUS; SUBCUTANEOUS at 01:10

## 2021-01-01 RX ADMIN — FENTANYL CITRATE 50 MCG: 50 INJECTION, SOLUTION INTRAMUSCULAR; INTRAVENOUS at 12:57

## 2021-01-01 RX ADMIN — INSULIN LISPRO 12 UNITS: 100 INJECTION, SOLUTION INTRAVENOUS; SUBCUTANEOUS at 11:46

## 2021-01-01 RX ADMIN — ALPRAZOLAM 0.25 MG: 0.25 TABLET ORAL at 22:01

## 2021-01-01 RX ADMIN — IPRATROPIUM BROMIDE 2 PUFF: 17 AEROSOL, METERED RESPIRATORY (INHALATION) at 20:54

## 2021-01-01 RX ADMIN — SODIUM CHLORIDE, PRESERVATIVE FREE 10 ML: 5 INJECTION INTRAVENOUS at 09:28

## 2021-01-01 RX ADMIN — FAMOTIDINE 20 MG: 20 TABLET, FILM COATED ORAL at 08:00

## 2021-01-01 RX ADMIN — OXYCODONE HYDROCHLORIDE AND ACETAMINOPHEN 500 MG: 500 TABLET ORAL at 08:52

## 2021-01-01 RX ADMIN — Medication 2000 UNITS: at 09:02

## 2021-01-01 RX ADMIN — FAMOTIDINE 20 MG: 20 TABLET, FILM COATED ORAL at 09:25

## 2021-01-01 RX ADMIN — IPRATROPIUM BROMIDE 2 PUFF: 17 AEROSOL, METERED RESPIRATORY (INHALATION) at 08:53

## 2021-01-01 RX ADMIN — GUAIFENESIN DEXTROMETHORPHAN HYDROBROMIDE ORAL SOLUTION 10 ML: 200; 20 SOLUTION ORAL at 00:34

## 2021-01-01 RX ADMIN — FAMOTIDINE 20 MG: 20 TABLET, FILM COATED ORAL at 11:21

## 2021-01-01 RX ADMIN — FUROSEMIDE 20 MG: 10 INJECTION, SOLUTION INTRAMUSCULAR; INTRAVENOUS at 13:04

## 2021-01-01 RX ADMIN — DEXAMETHASONE SODIUM PHOSPHATE 10 MG: 10 INJECTION INTRAMUSCULAR; INTRAVENOUS at 17:55

## 2021-01-01 RX ADMIN — INSULIN LISPRO 15 UNITS: 100 INJECTION, SOLUTION INTRAVENOUS; SUBCUTANEOUS at 12:30

## 2021-01-01 RX ADMIN — OXYCODONE HYDROCHLORIDE AND ACETAMINOPHEN 500 MG: 500 TABLET ORAL at 08:13

## 2021-01-01 RX ADMIN — ALBUTEROL SULFATE 2 PUFF: 90 AEROSOL, METERED RESPIRATORY (INHALATION) at 13:03

## 2021-01-01 RX ADMIN — ONDANSETRON 4 MG: 2 INJECTION INTRAMUSCULAR; INTRAVENOUS at 18:06

## 2021-01-01 RX ADMIN — ENOXAPARIN SODIUM 30 MG: 30 INJECTION SUBCUTANEOUS at 23:23

## 2021-01-01 RX ADMIN — INSULIN GLARGINE 40 UNITS: 100 INJECTION, SOLUTION SUBCUTANEOUS at 08:00

## 2021-01-01 RX ADMIN — ALBUTEROL SULFATE 2 PUFF: 90 AEROSOL, METERED RESPIRATORY (INHALATION) at 08:37

## 2021-01-01 RX ADMIN — GUAIFENESIN DEXTROMETHORPHAN HYDROBROMIDE ORAL SOLUTION 10 ML: 200; 20 SOLUTION ORAL at 08:52

## 2021-01-01 RX ADMIN — IPRATROPIUM BROMIDE 2 PUFF: 17 AEROSOL, METERED RESPIRATORY (INHALATION) at 11:07

## 2021-01-01 RX ADMIN — INSULIN GLARGINE 35 UNITS: 100 INJECTION, SOLUTION SUBCUTANEOUS at 11:41

## 2021-01-01 RX ADMIN — INSULIN GLARGINE 40 UNITS: 100 INJECTION, SOLUTION SUBCUTANEOUS at 09:15

## 2021-01-01 RX ADMIN — CETIRIZINE HYDROCHLORIDE 10 MG: 10 TABLET ORAL at 08:34

## 2021-01-01 RX ADMIN — FAMOTIDINE 20 MG: 20 TABLET, FILM COATED ORAL at 08:52

## 2021-01-01 RX ADMIN — Medication 81 MG: at 08:47

## 2021-01-01 RX ADMIN — INSULIN GLARGINE 40 UNITS: 100 INJECTION, SOLUTION SUBCUTANEOUS at 11:20

## 2021-01-01 RX ADMIN — CETIRIZINE HYDROCHLORIDE 10 MG: 10 TABLET ORAL at 11:21

## 2021-01-01 RX ADMIN — FAMOTIDINE 20 MG: 20 TABLET, FILM COATED ORAL at 08:29

## 2021-01-01 RX ADMIN — SODIUM CHLORIDE, PRESERVATIVE FREE 10 ML: 5 INJECTION INTRAVENOUS at 08:59

## 2021-01-01 RX ADMIN — ALPRAZOLAM 0.25 MG: 0.25 TABLET ORAL at 00:21

## 2021-01-01 RX ADMIN — IOPAMIDOL 85 ML: 755 INJECTION, SOLUTION INTRAVENOUS at 10:28

## 2021-01-01 RX ADMIN — TRAMADOL HYDROCHLORIDE 50 MG: 50 TABLET, COATED ORAL at 06:15

## 2021-01-01 RX ADMIN — Medication 81 MG: at 08:33

## 2021-01-01 RX ADMIN — INSULIN LISPRO 6 UNITS: 100 INJECTION, SOLUTION INTRAVENOUS; SUBCUTANEOUS at 17:17

## 2021-01-01 RX ADMIN — INSULIN GLARGINE 50 UNITS: 100 INJECTION, SOLUTION SUBCUTANEOUS at 21:00

## 2021-01-01 RX ADMIN — BUSPIRONE HYDROCHLORIDE 10 MG: 10 TABLET ORAL at 11:17

## 2021-01-01 RX ADMIN — OXYCODONE HYDROCHLORIDE AND ACETAMINOPHEN 500 MG: 500 TABLET ORAL at 09:02

## 2021-01-01 RX ADMIN — ENOXAPARIN SODIUM 30 MG: 30 INJECTION SUBCUTANEOUS at 11:22

## 2021-01-01 RX ADMIN — INSULIN GLARGINE 55 UNITS: 100 INJECTION, SOLUTION SUBCUTANEOUS at 09:46

## 2021-01-01 RX ADMIN — OXYCODONE HYDROCHLORIDE AND ACETAMINOPHEN 500 MG: 500 TABLET ORAL at 08:29

## 2021-01-01 RX ADMIN — SODIUM CHLORIDE, PRESERVATIVE FREE 10 ML: 5 INJECTION INTRAVENOUS at 23:17

## 2021-01-01 RX ADMIN — REMDESIVIR 200 MG: 100 INJECTION, POWDER, LYOPHILIZED, FOR SOLUTION INTRAVENOUS at 04:25

## 2021-01-01 RX ADMIN — FAMOTIDINE 20 MG: 20 TABLET, FILM COATED ORAL at 08:47

## 2021-01-01 RX ADMIN — DEXAMETHASONE SODIUM PHOSPHATE 10 MG: 10 INJECTION INTRAMUSCULAR; INTRAVENOUS at 08:36

## 2021-01-01 RX ADMIN — ENOXAPARIN SODIUM 30 MG: 30 INJECTION SUBCUTANEOUS at 08:47

## 2021-01-01 RX ADMIN — FLUTICASONE PROPIONATE 1 SPRAY: 50 SPRAY, METERED NASAL at 08:59

## 2021-01-01 RX ADMIN — INSULIN LISPRO 3 UNITS: 100 INJECTION, SOLUTION INTRAVENOUS; SUBCUTANEOUS at 09:18

## 2021-01-01 RX ADMIN — SODIUM CHLORIDE 11.04 UNITS/HR: 9 INJECTION, SOLUTION INTRAVENOUS at 23:07

## 2021-01-01 RX ADMIN — METOPROLOL TARTRATE 5 MG: 5 INJECTION INTRAVENOUS at 00:00

## 2021-01-01 RX ADMIN — FERROUS SULFATE TAB EC 325 MG (65 MG FE EQUIVALENT) 325 MG: 325 (65 FE) TABLET DELAYED RESPONSE at 08:16

## 2021-01-01 RX ADMIN — ENOXAPARIN SODIUM 30 MG: 30 INJECTION SUBCUTANEOUS at 09:57

## 2021-01-01 RX ADMIN — INSULIN LISPRO 15 UNITS: 100 INJECTION, SOLUTION INTRAVENOUS; SUBCUTANEOUS at 18:28

## 2021-01-01 RX ADMIN — FERROUS SULFATE TAB EC 325 MG (65 MG FE EQUIVALENT) 325 MG: 325 (65 FE) TABLET DELAYED RESPONSE at 09:25

## 2021-01-01 RX ADMIN — ENOXAPARIN SODIUM 30 MG: 30 INJECTION SUBCUTANEOUS at 19:51

## 2021-01-01 RX ADMIN — GUAIFENESIN DEXTROMETHORPHAN HYDROBROMIDE ORAL SOLUTION 10 ML: 200; 20 SOLUTION ORAL at 22:40

## 2021-01-01 RX ADMIN — INSULIN GLARGINE 10 UNITS: 100 INJECTION, SOLUTION SUBCUTANEOUS at 09:30

## 2021-01-01 RX ADMIN — SODIUM CHLORIDE, PRESERVATIVE FREE 10 ML: 5 INJECTION INTRAVENOUS at 20:41

## 2021-01-01 RX ADMIN — ALPRAZOLAM 0.25 MG: 0.25 TABLET ORAL at 20:53

## 2021-01-01 RX ADMIN — DEXAMETHASONE SODIUM PHOSPHATE 10 MG: 10 INJECTION INTRAMUSCULAR; INTRAVENOUS at 08:34

## 2021-01-01 RX ADMIN — OXYCODONE HYDROCHLORIDE AND ACETAMINOPHEN 500 MG: 500 TABLET ORAL at 20:09

## 2021-01-01 RX ADMIN — INSULIN LISPRO 12 UNITS: 100 INJECTION, SOLUTION INTRAVENOUS; SUBCUTANEOUS at 11:30

## 2021-01-01 RX ADMIN — TOCILIZUMAB 810 MG: 180 INJECTION, SOLUTION SUBCUTANEOUS at 19:51

## 2021-01-01 RX ADMIN — ENOXAPARIN SODIUM 30 MG: 30 INJECTION SUBCUTANEOUS at 21:17

## 2021-01-01 RX ADMIN — ALPRAZOLAM 0.25 MG: 0.25 TABLET ORAL at 08:54

## 2021-01-01 RX ADMIN — CETIRIZINE HYDROCHLORIDE 10 MG: 10 TABLET ORAL at 08:35

## 2021-01-01 RX ADMIN — DEXAMETHASONE SODIUM PHOSPHATE 20 MG: 4 INJECTION, SOLUTION INTRAMUSCULAR; INTRAVENOUS at 17:20

## 2021-01-01 RX ADMIN — INSULIN LISPRO 5 UNITS: 100 INJECTION, SOLUTION INTRAVENOUS; SUBCUTANEOUS at 21:46

## 2021-01-01 RX ADMIN — FAMOTIDINE 20 MG: 20 TABLET, FILM COATED ORAL at 07:54

## 2021-01-01 RX ADMIN — IPRATROPIUM BROMIDE 2 PUFF: 17 AEROSOL, METERED RESPIRATORY (INHALATION) at 13:03

## 2021-01-01 RX ADMIN — CELECOXIB 50 MG: 50 CAPSULE ORAL at 13:54

## 2021-01-01 RX ADMIN — FERROUS SULFATE TAB EC 325 MG (65 MG FE EQUIVALENT) 325 MG: 325 (65 FE) TABLET DELAYED RESPONSE at 08:59

## 2021-01-01 RX ADMIN — FLUTICASONE PROPIONATE 1 SPRAY: 50 SPRAY, METERED NASAL at 08:36

## 2021-01-01 RX ADMIN — INSULIN LISPRO 12 UNITS: 100 INJECTION, SOLUTION INTRAVENOUS; SUBCUTANEOUS at 17:03

## 2021-01-01 RX ADMIN — ALBUTEROL SULFATE 2 PUFF: 90 AEROSOL, METERED RESPIRATORY (INHALATION) at 16:28

## 2021-01-01 RX ADMIN — ALPRAZOLAM 0.25 MG: 0.25 TABLET ORAL at 09:05

## 2021-01-01 RX ADMIN — SODIUM CHLORIDE, PRESERVATIVE FREE 5 ML: 5 INJECTION INTRAVENOUS at 09:03

## 2021-01-01 RX ADMIN — ALPRAZOLAM 0.25 MG: 0.25 TABLET ORAL at 21:01

## 2021-01-01 RX ADMIN — FUROSEMIDE 40 MG: 10 INJECTION, SOLUTION INTRAMUSCULAR; INTRAVENOUS at 11:42

## 2021-01-01 RX ADMIN — FERROUS SULFATE TAB EC 325 MG (65 MG FE EQUIVALENT) 325 MG: 325 (65 FE) TABLET DELAYED RESPONSE at 08:29

## 2021-01-01 RX ADMIN — INSULIN GLARGINE 25 UNITS: 100 INJECTION, SOLUTION SUBCUTANEOUS at 07:59

## 2021-01-01 RX ADMIN — IPRATROPIUM BROMIDE 2 PUFF: 17 AEROSOL, METERED RESPIRATORY (INHALATION) at 08:36

## 2021-01-01 RX ADMIN — IPRATROPIUM BROMIDE 2 PUFF: 17 AEROSOL, METERED RESPIRATORY (INHALATION) at 08:41

## 2021-01-01 RX ADMIN — Medication 2000 UNITS: at 08:59

## 2021-01-01 RX ADMIN — SODIUM PHOSPHATE, MONOBASIC, MONOHYDRATE 15 MMOL: 276; 142 INJECTION, SOLUTION INTRAVENOUS at 01:12

## 2021-01-01 RX ADMIN — Medication 5 MCG/MIN: at 03:29

## 2021-01-01 RX ADMIN — POTASSIUM CHLORIDE 40 MEQ: 1500 TABLET, EXTENDED RELEASE ORAL at 01:50

## 2021-01-01 RX ADMIN — Medication 81 MG: at 07:54

## 2021-01-01 RX ADMIN — FERROUS SULFATE TAB EC 325 MG (65 MG FE EQUIVALENT) 325 MG: 325 (65 FE) TABLET DELAYED RESPONSE at 08:00

## 2021-01-01 RX ADMIN — INSULIN GLARGINE 50 UNITS: 100 INJECTION, SOLUTION SUBCUTANEOUS at 08:05

## 2021-01-01 RX ADMIN — INSULIN GLARGINE 20 UNITS: 100 INJECTION, SOLUTION SUBCUTANEOUS at 21:00

## 2021-01-01 RX ADMIN — DEXTROMETHORPHAN HYDROBROMIDE AND GUAIFENESIN 5 ML: 20; 200 LIQUID ORAL at 21:10

## 2021-01-01 RX ADMIN — INSULIN LISPRO 9 UNITS: 100 INJECTION, SOLUTION INTRAVENOUS; SUBCUTANEOUS at 08:30

## 2021-01-01 RX ADMIN — INSULIN GLARGINE 35 UNITS: 100 INJECTION, SOLUTION SUBCUTANEOUS at 21:00

## 2021-01-01 RX ADMIN — DEXAMETHASONE SODIUM PHOSPHATE 10 MG: 10 INJECTION INTRAMUSCULAR; INTRAVENOUS at 08:29

## 2021-01-01 RX ADMIN — FERROUS SULFATE TAB EC 325 MG (65 MG FE EQUIVALENT) 325 MG: 325 (65 FE) TABLET DELAYED RESPONSE at 08:53

## 2021-01-01 RX ADMIN — SODIUM CHLORIDE, PRESERVATIVE FREE 10 ML: 5 INJECTION INTRAVENOUS at 20:09

## 2021-01-01 RX ADMIN — Medication 81 MG: at 08:16

## 2021-01-01 RX ADMIN — CETIRIZINE HYDROCHLORIDE 10 MG: 10 TABLET ORAL at 07:53

## 2021-01-01 RX ADMIN — FERROUS SULFATE TAB EC 325 MG (65 MG FE EQUIVALENT) 325 MG: 325 (65 FE) TABLET DELAYED RESPONSE at 07:53

## 2021-01-01 RX ADMIN — FUROSEMIDE 20 MG: 10 INJECTION, SOLUTION INTRAMUSCULAR; INTRAVENOUS at 09:03

## 2021-01-01 RX ADMIN — OXYCODONE HYDROCHLORIDE AND ACETAMINOPHEN 500 MG: 500 TABLET ORAL at 23:23

## 2021-01-01 RX ADMIN — Medication 81 MG: at 08:59

## 2021-01-01 RX ADMIN — ALBUTEROL SULFATE 2 PUFF: 90 AEROSOL, METERED RESPIRATORY (INHALATION) at 08:53

## 2021-01-01 RX ADMIN — DEXAMETHASONE SODIUM PHOSPHATE 20 MG: 4 INJECTION, SOLUTION INTRAMUSCULAR; INTRAVENOUS at 18:22

## 2021-01-01 RX ADMIN — DEXAMETHASONE SODIUM PHOSPHATE 20 MG: 4 INJECTION, SOLUTION INTRAMUSCULAR; INTRAVENOUS at 20:04

## 2021-01-01 RX ADMIN — TRAMADOL HYDROCHLORIDE 50 MG: 50 TABLET, COATED ORAL at 09:25

## 2021-01-01 RX ADMIN — OXYCODONE HYDROCHLORIDE AND ACETAMINOPHEN 500 MG: 500 TABLET ORAL at 08:59

## 2021-01-01 RX ADMIN — ENOXAPARIN SODIUM 30 MG: 30 INJECTION SUBCUTANEOUS at 08:29

## 2021-01-01 RX ADMIN — FAMOTIDINE 20 MG: 20 TABLET, FILM COATED ORAL at 08:14

## 2021-01-01 RX ADMIN — ENOXAPARIN SODIUM 30 MG: 30 INJECTION SUBCUTANEOUS at 20:37

## 2021-01-01 RX ADMIN — CELECOXIB 50 MG: 50 CAPSULE ORAL at 09:25

## 2021-01-01 RX ADMIN — FUROSEMIDE 20 MG: 10 INJECTION, SOLUTION INTRAMUSCULAR; INTRAVENOUS at 15:25

## 2021-01-01 RX ADMIN — Medication 10 MG/HR: at 23:30

## 2021-01-01 RX ADMIN — IPRATROPIUM BROMIDE 2 PUFF: 17 AEROSOL, METERED RESPIRATORY (INHALATION) at 16:28

## 2021-01-01 RX ADMIN — ENOXAPARIN SODIUM 40 MG: 40 INJECTION SUBCUTANEOUS at 07:51

## 2021-01-01 RX ADMIN — CETIRIZINE HYDROCHLORIDE 10 MG: 10 TABLET ORAL at 08:52

## 2021-01-01 RX ADMIN — INSULIN LISPRO 15 UNITS: 100 INJECTION, SOLUTION INTRAVENOUS; SUBCUTANEOUS at 18:44

## 2021-01-01 RX ADMIN — ALPRAZOLAM 0.25 MG: 0.25 TABLET ORAL at 23:36

## 2021-01-01 RX ADMIN — CETIRIZINE HYDROCHLORIDE 10 MG: 10 TABLET ORAL at 08:29

## 2021-01-01 RX ADMIN — INSULIN GLARGINE 50 UNITS: 100 INJECTION, SOLUTION SUBCUTANEOUS at 21:41

## 2021-01-01 RX ADMIN — PHENYLEPHRINE HYDROCHLORIDE 200 MCG/MIN: 10 INJECTION INTRAVENOUS at 03:26

## 2021-01-01 RX ADMIN — INSULIN GLARGINE 35 UNITS: 100 INJECTION, SOLUTION SUBCUTANEOUS at 20:09

## 2021-01-01 RX ADMIN — DEXTROMETHORPHAN HYDROBROMIDE AND GUAIFENESIN 5 ML: 20; 200 LIQUID ORAL at 20:26

## 2021-01-01 RX ADMIN — INSULIN LISPRO 18 UNITS: 100 INJECTION, SOLUTION INTRAVENOUS; SUBCUTANEOUS at 17:00

## 2021-01-01 RX ADMIN — FUROSEMIDE 20 MG: 10 INJECTION, SOLUTION INTRAMUSCULAR; INTRAVENOUS at 10:46

## 2021-01-01 RX ADMIN — POTASSIUM CHLORIDE 20 MEQ: 1500 TABLET, EXTENDED RELEASE ORAL at 10:46

## 2021-01-01 RX ADMIN — ENOXAPARIN SODIUM 30 MG: 30 INJECTION SUBCUTANEOUS at 08:53

## 2021-01-01 RX ADMIN — IPRATROPIUM BROMIDE 2 PUFF: 17 AEROSOL, METERED RESPIRATORY (INHALATION) at 21:08

## 2021-01-01 RX ADMIN — ALBUTEROL SULFATE 2 PUFF: 90 AEROSOL, METERED RESPIRATORY (INHALATION) at 16:49

## 2021-01-01 RX ADMIN — INSULIN GLARGINE 50 UNITS: 100 INJECTION, SOLUTION SUBCUTANEOUS at 20:45

## 2021-01-01 RX ADMIN — FUROSEMIDE 20 MG: 10 INJECTION, SOLUTION INTRAMUSCULAR; INTRAVENOUS at 19:42

## 2021-01-01 RX ADMIN — INSULIN LISPRO 18 UNITS: 100 INJECTION, SOLUTION INTRAVENOUS; SUBCUTANEOUS at 17:20

## 2021-01-01 RX ADMIN — ENOXAPARIN SODIUM 30 MG: 30 INJECTION SUBCUTANEOUS at 08:52

## 2021-01-01 RX ADMIN — ALBUTEROL SULFATE 2 PUFF: 90 AEROSOL, METERED RESPIRATORY (INHALATION) at 11:49

## 2021-01-01 RX ADMIN — SODIUM CHLORIDE 500 ML: 9 INJECTION, SOLUTION INTRAVENOUS at 16:39

## 2021-01-01 RX ADMIN — INSULIN LISPRO 6 UNITS: 100 INJECTION, SOLUTION INTRAVENOUS; SUBCUTANEOUS at 12:00

## 2021-01-01 RX ADMIN — FERROUS SULFATE TAB EC 325 MG (65 MG FE EQUIVALENT) 325 MG: 325 (65 FE) TABLET DELAYED RESPONSE at 08:52

## 2021-01-01 RX ADMIN — FAMOTIDINE 20 MG: 20 TABLET, FILM COATED ORAL at 08:33

## 2021-01-01 RX ADMIN — FLUTICASONE PROPIONATE 1 SPRAY: 50 SPRAY, METERED NASAL at 08:01

## 2021-01-01 RX ADMIN — INSULIN LISPRO 2 UNITS: 100 INJECTION, SOLUTION INTRAVENOUS; SUBCUTANEOUS at 20:04

## 2021-01-01 RX ADMIN — DEXTROMETHORPHAN HYDROBROMIDE AND GUAIFENESIN 5 ML: 20; 200 LIQUID ORAL at 09:14

## 2021-01-01 RX ADMIN — OXYCODONE HYDROCHLORIDE AND ACETAMINOPHEN 500 MG: 500 TABLET ORAL at 21:37

## 2021-01-01 RX ADMIN — FUROSEMIDE 20 MG: 10 INJECTION, SOLUTION INTRAMUSCULAR; INTRAVENOUS at 08:51

## 2021-01-01 RX ADMIN — Medication 81 MG: at 08:00

## 2021-01-01 RX ADMIN — OXYCODONE HYDROCHLORIDE AND ACETAMINOPHEN 500 MG: 500 TABLET ORAL at 20:26

## 2021-01-01 RX ADMIN — SODIUM CHLORIDE, PRESERVATIVE FREE 10 ML: 5 INJECTION INTRAVENOUS at 21:01

## 2021-01-01 RX ADMIN — FLUTICASONE PROPIONATE 1 SPRAY: 50 SPRAY, METERED NASAL at 08:35

## 2021-01-01 RX ADMIN — DEXTROMETHORPHAN HYDROBROMIDE AND GUAIFENESIN 5 ML: 20; 200 LIQUID ORAL at 08:54

## 2021-01-01 RX ADMIN — ALPRAZOLAM 0.25 MG: 0.25 TABLET ORAL at 21:39

## 2021-01-01 RX ADMIN — FERROUS SULFATE TAB EC 325 MG (65 MG FE EQUIVALENT) 325 MG: 325 (65 FE) TABLET DELAYED RESPONSE at 09:02

## 2021-01-01 RX ADMIN — Medication 2000 UNITS: at 08:35

## 2021-01-01 RX ADMIN — INSULIN HUMAN 15 UNITS: 100 INJECTION, SOLUTION PARENTERAL at 02:17

## 2021-01-01 RX ADMIN — ENOXAPARIN SODIUM 30 MG: 30 INJECTION SUBCUTANEOUS at 08:25

## 2021-01-01 RX ADMIN — FUROSEMIDE 20 MG: 10 INJECTION, SOLUTION INTRAMUSCULAR; INTRAVENOUS at 08:16

## 2021-01-01 RX ADMIN — GUAIFENESIN DEXTROMETHORPHAN HYDROBROMIDE ORAL SOLUTION 10 ML: 200; 20 SOLUTION ORAL at 22:57

## 2021-01-01 RX ADMIN — Medication 81 MG: at 08:13

## 2021-01-01 RX ADMIN — CETIRIZINE HYDROCHLORIDE 10 MG: 10 TABLET ORAL at 09:02

## 2021-01-01 RX ADMIN — OXYCODONE HYDROCHLORIDE AND ACETAMINOPHEN 500 MG: 500 TABLET ORAL at 22:00

## 2021-01-01 RX ADMIN — FERROUS SULFATE TAB EC 325 MG (65 MG FE EQUIVALENT) 325 MG: 325 (65 FE) TABLET DELAYED RESPONSE at 09:57

## 2021-01-01 RX ADMIN — SODIUM CHLORIDE, PRESERVATIVE FREE 10 ML: 5 INJECTION INTRAVENOUS at 20:11

## 2021-01-01 RX ADMIN — OXYCODONE HYDROCHLORIDE AND ACETAMINOPHEN 500 MG: 500 TABLET ORAL at 20:53

## 2021-01-01 RX ADMIN — ALBUTEROL SULFATE 2 PUFF: 90 AEROSOL, METERED RESPIRATORY (INHALATION) at 21:08

## 2021-01-01 RX ADMIN — INSULIN LISPRO 9 UNITS: 100 INJECTION, SOLUTION INTRAVENOUS; SUBCUTANEOUS at 11:30

## 2021-01-01 RX ADMIN — INSULIN GLARGINE 40 UNITS: 100 INJECTION, SOLUTION SUBCUTANEOUS at 09:00

## 2021-01-01 RX ADMIN — CETIRIZINE HYDROCHLORIDE 10 MG: 10 TABLET ORAL at 11:39

## 2021-01-01 RX ADMIN — AMOXICILLIN AND CLAVULANATE POTASSIUM 1 TABLET: 875; 125 TABLET, FILM COATED ORAL at 20:41

## 2021-01-01 RX ADMIN — Medication 81 MG: at 08:52

## 2021-01-01 RX ADMIN — Medication 81 MG: at 09:02

## 2021-01-01 RX ADMIN — INSULIN LISPRO 6 UNITS: 100 INJECTION, SOLUTION INTRAVENOUS; SUBCUTANEOUS at 11:56

## 2021-01-01 RX ADMIN — INSULIN GLARGINE 40 UNITS: 100 INJECTION, SOLUTION SUBCUTANEOUS at 21:47

## 2021-01-01 RX ADMIN — ALPRAZOLAM 0.25 MG: 0.25 TABLET ORAL at 00:39

## 2021-01-01 RX ADMIN — ALPRAZOLAM 0.25 MG: 0.25 TABLET ORAL at 21:09

## 2021-01-01 RX ADMIN — ACETAMINOPHEN 650 MG: 325 TABLET ORAL at 18:43

## 2021-01-01 RX ADMIN — FLUTICASONE PROPIONATE 1 SPRAY: 50 SPRAY, METERED NASAL at 08:12

## 2021-01-01 RX ADMIN — ALPRAZOLAM 0.25 MG: 0.25 TABLET ORAL at 20:25

## 2021-01-01 RX ADMIN — SODIUM CHLORIDE 8.94 UNITS/HR: 9 INJECTION, SOLUTION INTRAVENOUS at 02:45

## 2021-01-01 RX ADMIN — SODIUM CHLORIDE 25 ML: 9 INJECTION, SOLUTION INTRAVENOUS at 18:22

## 2021-01-01 RX ADMIN — DEXAMETHASONE SODIUM PHOSPHATE 20 MG: 4 INJECTION, SOLUTION INTRAMUSCULAR; INTRAVENOUS at 17:21

## 2021-01-01 RX ADMIN — FERROUS SULFATE TAB EC 325 MG (65 MG FE EQUIVALENT) 325 MG: 325 (65 FE) TABLET DELAYED RESPONSE at 08:33

## 2021-01-01 RX ADMIN — ALPRAZOLAM 0.25 MG: 0.25 TABLET ORAL at 12:24

## 2021-01-01 RX ADMIN — TRAMADOL HYDROCHLORIDE 50 MG: 50 TABLET, COATED ORAL at 14:43

## 2021-01-01 RX ADMIN — INSULIN GLARGINE 25 UNITS: 100 INJECTION, SOLUTION SUBCUTANEOUS at 21:05

## 2021-01-01 RX ADMIN — SODIUM CHLORIDE, PRESERVATIVE FREE 10 ML: 5 INJECTION INTRAVENOUS at 21:00

## 2021-01-01 RX ADMIN — INSULIN LISPRO 6 UNITS: 100 INJECTION, SOLUTION INTRAVENOUS; SUBCUTANEOUS at 11:48

## 2021-01-01 RX ADMIN — OXYCODONE HYDROCHLORIDE AND ACETAMINOPHEN 500 MG: 500 TABLET ORAL at 21:17

## 2021-01-01 RX ADMIN — DEXTROSE MONOHYDRATE 12.5 G: 25 INJECTION, SOLUTION INTRAVENOUS at 02:17

## 2021-01-01 RX ADMIN — INSULIN LISPRO 7 UNITS: 100 INJECTION, SOLUTION INTRAVENOUS; SUBCUTANEOUS at 21:41

## 2021-01-01 RX ADMIN — FLUTICASONE PROPIONATE 1 SPRAY: 50 SPRAY, METERED NASAL at 11:40

## 2021-01-01 RX ADMIN — GUAIFENESIN DEXTROMETHORPHAN HYDROBROMIDE ORAL SOLUTION 10 ML: 200; 20 SOLUTION ORAL at 07:59

## 2021-01-01 RX ADMIN — GUAIFENESIN DEXTROMETHORPHAN HYDROBROMIDE ORAL SOLUTION 10 ML: 200; 20 SOLUTION ORAL at 20:25

## 2021-01-01 RX ADMIN — ENOXAPARIN SODIUM 30 MG: 30 INJECTION SUBCUTANEOUS at 20:22

## 2021-01-01 RX ADMIN — ALBUTEROL SULFATE 2 PUFF: 90 AEROSOL, METERED RESPIRATORY (INHALATION) at 08:41

## 2021-01-01 RX ADMIN — TRAMADOL HYDROCHLORIDE 50 MG: 50 TABLET, COATED ORAL at 22:02

## 2021-01-01 RX ADMIN — ENOXAPARIN SODIUM 30 MG: 30 INJECTION SUBCUTANEOUS at 08:16

## 2021-01-01 RX ADMIN — Medication 81 MG: at 11:42

## 2021-01-01 RX ADMIN — CETIRIZINE HYDROCHLORIDE 10 MG: 10 TABLET ORAL at 09:25

## 2021-01-01 RX ADMIN — ENOXAPARIN SODIUM 30 MG: 30 INJECTION SUBCUTANEOUS at 09:02

## 2021-01-01 RX ADMIN — DEXAMETHASONE SODIUM PHOSPHATE 10 MG: 10 INJECTION INTRAMUSCULAR; INTRAVENOUS at 08:00

## 2021-01-01 RX ADMIN — Medication 2000 UNITS: at 08:29

## 2021-01-01 RX ADMIN — ALBUTEROL SULFATE 2 PUFF: 90 AEROSOL, METERED RESPIRATORY (INHALATION) at 20:54

## 2021-01-01 RX ADMIN — IPRATROPIUM BROMIDE 2 PUFF: 17 AEROSOL, METERED RESPIRATORY (INHALATION) at 20:21

## 2021-01-01 RX ADMIN — OXYCODONE HYDROCHLORIDE AND ACETAMINOPHEN 500 MG: 500 TABLET ORAL at 19:42

## 2021-01-01 RX ADMIN — INSULIN GLARGINE 40 UNITS: 100 INJECTION, SOLUTION SUBCUTANEOUS at 00:32

## 2021-01-01 RX ADMIN — SODIUM CHLORIDE, PRESERVATIVE FREE 10 ML: 5 INJECTION INTRAVENOUS at 08:15

## 2021-01-01 RX ADMIN — SODIUM CHLORIDE 10.02 UNITS/HR: 9 INJECTION, SOLUTION INTRAVENOUS at 21:43

## 2021-01-01 RX ADMIN — OXYCODONE HYDROCHLORIDE AND ACETAMINOPHEN 500 MG: 500 TABLET ORAL at 11:39

## 2021-01-01 RX ADMIN — INSULIN LISPRO 15 UNITS: 100 INJECTION, SOLUTION INTRAVENOUS; SUBCUTANEOUS at 17:46

## 2021-01-01 RX ADMIN — ALBUTEROL SULFATE 2 PUFF: 90 AEROSOL, METERED RESPIRATORY (INHALATION) at 20:11

## 2021-01-01 RX ADMIN — INSULIN LISPRO 9 UNITS: 100 INJECTION, SOLUTION INTRAVENOUS; SUBCUTANEOUS at 12:04

## 2021-01-01 RX ADMIN — CALCIUM CHLORIDE: 100 INJECTION, SOLUTION INTRAVENOUS; INTRAVENTRICULAR at 02:00

## 2021-01-01 RX ADMIN — ACETAMINOPHEN 650 MG: 325 TABLET ORAL at 01:50

## 2021-01-01 RX ADMIN — ALBUTEROL SULFATE 2 PUFF: 90 AEROSOL, METERED RESPIRATORY (INHALATION) at 08:21

## 2021-01-01 RX ADMIN — SODIUM CHLORIDE, PRESERVATIVE FREE 5 ML: 5 INJECTION INTRAVENOUS at 08:54

## 2021-01-01 RX ADMIN — CETIRIZINE HYDROCHLORIDE 10 MG: 10 TABLET ORAL at 08:00

## 2021-01-01 RX ADMIN — FERROUS SULFATE TAB EC 325 MG (65 MG FE EQUIVALENT) 325 MG: 325 (65 FE) TABLET DELAYED RESPONSE at 11:21

## 2021-01-01 RX ADMIN — Medication 100 MCG/MIN: at 03:37

## 2021-01-01 RX ADMIN — ENOXAPARIN SODIUM 30 MG: 30 INJECTION SUBCUTANEOUS at 01:47

## 2021-01-01 RX ADMIN — SODIUM CHLORIDE, PRESERVATIVE FREE 10 ML: 5 INJECTION INTRAVENOUS at 08:53

## 2021-01-01 RX ADMIN — IPRATROPIUM BROMIDE 2 PUFF: 17 AEROSOL, METERED RESPIRATORY (INHALATION) at 14:26

## 2021-01-01 RX ADMIN — ALBUTEROL SULFATE 2 PUFF: 90 AEROSOL, METERED RESPIRATORY (INHALATION) at 09:14

## 2021-01-01 RX ADMIN — INSULIN LISPRO 6 UNITS: 100 INJECTION, SOLUTION INTRAVENOUS; SUBCUTANEOUS at 16:51

## 2021-01-01 RX ADMIN — GUAIFENESIN DEXTROMETHORPHAN HYDROBROMIDE ORAL SOLUTION 10 ML: 200; 20 SOLUTION ORAL at 14:40

## 2021-01-01 RX ADMIN — CETIRIZINE HYDROCHLORIDE 10 MG: 10 TABLET ORAL at 08:14

## 2021-01-01 RX ADMIN — INSULIN LISPRO 18 UNITS: 100 INJECTION, SOLUTION INTRAVENOUS; SUBCUTANEOUS at 14:00

## 2021-01-01 RX ADMIN — FAMOTIDINE 20 MG: 20 TABLET, FILM COATED ORAL at 11:43

## 2021-01-01 RX ADMIN — INSULIN GLARGINE 35 UNITS: 100 INJECTION, SOLUTION SUBCUTANEOUS at 09:28

## 2021-01-01 RX ADMIN — ENOXAPARIN SODIUM 30 MG: 30 INJECTION SUBCUTANEOUS at 21:44

## 2021-01-01 RX ADMIN — INSULIN LISPRO 5 UNITS: 100 INJECTION, SOLUTION INTRAVENOUS; SUBCUTANEOUS at 00:22

## 2021-01-01 RX ADMIN — FERROUS SULFATE TAB EC 325 MG (65 MG FE EQUIVALENT) 325 MG: 325 (65 FE) TABLET DELAYED RESPONSE at 08:14

## 2021-01-01 RX ADMIN — OXYCODONE HYDROCHLORIDE AND ACETAMINOPHEN 500 MG: 500 TABLET ORAL at 20:45

## 2021-01-01 RX ADMIN — Medication 81 MG: at 09:57

## 2021-01-01 RX ADMIN — FERROUS SULFATE TAB EC 325 MG (65 MG FE EQUIVALENT) 325 MG: 325 (65 FE) TABLET DELAYED RESPONSE at 11:42

## 2021-01-01 RX ADMIN — ALPRAZOLAM 0.25 MG: 0.25 TABLET ORAL at 22:40

## 2021-01-01 RX ADMIN — VASOPRESSIN 0.04 UNITS/MIN: 20 INJECTION INTRAVENOUS at 01:30

## 2021-01-01 RX ADMIN — Medication 2000 UNITS: at 11:43

## 2021-01-01 RX ADMIN — ALBUTEROL SULFATE 2 PUFF: 90 AEROSOL, METERED RESPIRATORY (INHALATION) at 08:23

## 2021-01-01 RX ADMIN — ENOXAPARIN SODIUM 30 MG: 30 INJECTION SUBCUTANEOUS at 20:50

## 2021-01-01 RX ADMIN — ALPRAZOLAM 0.25 MG: 0.25 TABLET ORAL at 22:11

## 2021-01-01 RX ADMIN — ENOXAPARIN SODIUM 30 MG: 30 INJECTION SUBCUTANEOUS at 08:59

## 2021-01-01 RX ADMIN — INSULIN LISPRO 2 UNITS: 100 INJECTION, SOLUTION INTRAVENOUS; SUBCUTANEOUS at 20:40

## 2021-01-01 RX ADMIN — INSULIN LISPRO 3 UNITS: 100 INJECTION, SOLUTION INTRAVENOUS; SUBCUTANEOUS at 01:47

## 2021-01-01 RX ADMIN — FLUTICASONE PROPIONATE 1 SPRAY: 50 SPRAY, METERED NASAL at 09:03

## 2021-01-01 RX ADMIN — DEXTROMETHORPHAN HYDROBROMIDE AND GUAIFENESIN 5 ML: 20; 200 LIQUID ORAL at 17:57

## 2021-01-01 RX ADMIN — ALBUTEROL SULFATE 2 PUFF: 90 AEROSOL, METERED RESPIRATORY (INHALATION) at 14:27

## 2021-01-01 RX ADMIN — ENOXAPARIN SODIUM 30 MG: 30 INJECTION SUBCUTANEOUS at 08:34

## 2021-01-01 RX ADMIN — INSULIN LISPRO 3 UNITS: 100 INJECTION, SOLUTION INTRAVENOUS; SUBCUTANEOUS at 21:00

## 2021-01-01 RX ADMIN — INSULIN GLARGINE 50 UNITS: 100 INJECTION, SOLUTION SUBCUTANEOUS at 21:16

## 2021-01-01 RX ADMIN — ENOXAPARIN SODIUM 30 MG: 30 INJECTION SUBCUTANEOUS at 19:42

## 2021-01-01 RX ADMIN — FERROUS SULFATE TAB EC 325 MG (65 MG FE EQUIVALENT) 325 MG: 325 (65 FE) TABLET DELAYED RESPONSE at 08:26

## 2021-01-01 RX ADMIN — SODIUM CHLORIDE, PRESERVATIVE FREE 10 ML: 5 INJECTION INTRAVENOUS at 23:35

## 2021-01-01 RX ADMIN — ENOXAPARIN SODIUM 40 MG: 40 INJECTION SUBCUTANEOUS at 11:00

## 2021-01-01 RX ADMIN — INSULIN GLARGINE 40 UNITS: 100 INJECTION, SOLUTION SUBCUTANEOUS at 20:40

## 2021-01-01 RX ADMIN — CISATRACURIUM BESYLATE 1 MCG/KG/MIN: 200 INJECTION, SOLUTION INTRAVENOUS at 03:35

## 2021-01-01 RX ADMIN — INSULIN GLARGINE 50 UNITS: 100 INJECTION, SOLUTION SUBCUTANEOUS at 09:23

## 2021-01-01 RX ADMIN — Medication 81 MG: at 08:29

## 2021-01-01 RX ADMIN — INSULIN LISPRO 6 UNITS: 100 INJECTION, SOLUTION INTRAVENOUS; SUBCUTANEOUS at 21:05

## 2021-01-01 RX ADMIN — Medication 2000 UNITS: at 08:14

## 2021-01-01 RX ADMIN — ACETAMINOPHEN 650 MG: 325 TABLET ORAL at 20:26

## 2021-01-01 RX ADMIN — SODIUM CHLORIDE, PRESERVATIVE FREE 10 ML: 5 INJECTION INTRAVENOUS at 20:53

## 2021-01-01 RX ADMIN — OXYCODONE HYDROCHLORIDE AND ACETAMINOPHEN 500 MG: 500 TABLET ORAL at 11:43

## 2021-01-01 RX ADMIN — Medication 81 MG: at 11:21

## 2021-01-01 RX ADMIN — INSULIN LISPRO 20 UNITS: 100 INJECTION, SOLUTION INTRAVENOUS; SUBCUTANEOUS at 10:15

## 2021-01-01 RX ADMIN — INSULIN GLARGINE 20 UNITS: 100 INJECTION, SOLUTION SUBCUTANEOUS at 09:17

## 2021-01-01 RX ADMIN — BUSPIRONE HYDROCHLORIDE 10 MG: 10 TABLET ORAL at 22:14

## 2021-01-01 RX ADMIN — Medication 2000 UNITS: at 09:57

## 2021-01-01 RX ADMIN — GUAIFENESIN DEXTROMETHORPHAN HYDROBROMIDE ORAL SOLUTION 10 ML: 200; 20 SOLUTION ORAL at 22:30

## 2021-01-01 RX ADMIN — SODIUM CHLORIDE, PRESERVATIVE FREE 10 ML: 5 INJECTION INTRAVENOUS at 07:54

## 2021-01-01 RX ADMIN — INSULIN LISPRO 6 UNITS: 100 INJECTION, SOLUTION INTRAVENOUS; SUBCUTANEOUS at 20:10

## 2021-01-01 RX ADMIN — SODIUM CHLORIDE, PRESERVATIVE FREE 10 ML: 5 INJECTION INTRAVENOUS at 08:30

## 2021-01-01 RX ADMIN — FENTANYL CITRATE 50 MCG: 50 INJECTION, SOLUTION INTRAMUSCULAR; INTRAVENOUS at 17:15

## 2021-01-01 RX ADMIN — Medication 2000 UNITS: at 08:53

## 2021-01-01 RX ADMIN — INSULIN LISPRO 6 UNITS: 100 INJECTION, SOLUTION INTRAVENOUS; SUBCUTANEOUS at 23:24

## 2021-01-01 RX ADMIN — SODIUM CHLORIDE, PRESERVATIVE FREE 10 ML: 5 INJECTION INTRAVENOUS at 20:51

## 2021-01-01 RX ADMIN — DEXTROMETHORPHAN HYDROBROMIDE AND GUAIFENESIN 5 ML: 20; 200 LIQUID ORAL at 18:44

## 2021-01-01 RX ADMIN — IPRATROPIUM BROMIDE 2 PUFF: 17 AEROSOL, METERED RESPIRATORY (INHALATION) at 15:55

## 2021-01-01 RX ADMIN — AMOXICILLIN AND CLAVULANATE POTASSIUM 1 TABLET: 875; 125 TABLET, FILM COATED ORAL at 09:25

## 2021-01-01 RX ADMIN — ALPRAZOLAM 0.25 MG: 0.25 TABLET ORAL at 22:31

## 2021-01-01 RX ADMIN — INSULIN GLARGINE 50 UNITS: 100 INJECTION, SOLUTION SUBCUTANEOUS at 20:51

## 2021-01-01 RX ADMIN — INSULIN LISPRO 3 UNITS: 100 INJECTION, SOLUTION INTRAVENOUS; SUBCUTANEOUS at 20:04

## 2021-01-01 RX ADMIN — CETIRIZINE HYDROCHLORIDE 10 MG: 10 TABLET ORAL at 08:59

## 2021-01-01 RX ADMIN — CETIRIZINE HYDROCHLORIDE 10 MG: 10 TABLET ORAL at 08:26

## 2021-01-01 RX ADMIN — Medication 81 MG: at 08:35

## 2021-01-01 RX ADMIN — ENOXAPARIN SODIUM 30 MG: 30 INJECTION SUBCUTANEOUS at 08:35

## 2021-01-01 RX ADMIN — DEXAMETHASONE SODIUM PHOSPHATE 10 MG: 10 INJECTION INTRAMUSCULAR; INTRAVENOUS at 11:22

## 2021-01-01 RX ADMIN — Medication 2000 UNITS: at 10:12

## 2021-01-01 RX ADMIN — SODIUM CHLORIDE, PRESERVATIVE FREE 5 ML: 5 INJECTION INTRAVENOUS at 21:07

## 2021-01-01 RX ADMIN — BUSPIRONE HYDROCHLORIDE 10 MG: 10 TABLET ORAL at 14:43

## 2021-01-01 RX ADMIN — INSULIN LISPRO 7 UNITS: 100 INJECTION, SOLUTION INTRAVENOUS; SUBCUTANEOUS at 21:46

## 2021-01-01 RX ADMIN — Medication 81 MG: at 08:53

## 2021-01-01 RX ADMIN — INSULIN LISPRO 3 UNITS: 100 INJECTION, SOLUTION INTRAVENOUS; SUBCUTANEOUS at 08:39

## 2021-01-01 RX ADMIN — SODIUM CHLORIDE, PRESERVATIVE FREE 10 ML: 5 INJECTION INTRAVENOUS at 10:11

## 2021-01-01 RX ADMIN — ENOXAPARIN SODIUM 30 MG: 30 INJECTION SUBCUTANEOUS at 22:00

## 2021-01-01 RX ADMIN — SODIUM BICARBONATE 50 MEQ: 84 INJECTION, SOLUTION INTRAVENOUS at 03:37

## 2021-01-01 RX ADMIN — ENOXAPARIN SODIUM 30 MG: 30 INJECTION SUBCUTANEOUS at 09:51

## 2021-01-01 RX ADMIN — INSULIN LISPRO 6 UNITS: 100 INJECTION, SOLUTION INTRAVENOUS; SUBCUTANEOUS at 11:28

## 2021-01-01 RX ADMIN — FAMOTIDINE 20 MG: 20 TABLET, FILM COATED ORAL at 18:18

## 2021-01-01 RX ADMIN — ENOXAPARIN SODIUM 40 MG: 40 INJECTION SUBCUTANEOUS at 09:25

## 2021-01-01 RX ADMIN — FUROSEMIDE 20 MG: 10 INJECTION, SOLUTION INTRAMUSCULAR; INTRAVENOUS at 11:22

## 2021-01-01 RX ADMIN — Medication 2000 UNITS: at 08:25

## 2021-01-01 RX ADMIN — Medication 81 MG: at 09:25

## 2021-01-01 RX ADMIN — GUAIFENESIN DEXTROMETHORPHAN HYDROBROMIDE ORAL SOLUTION 10 ML: 200; 20 SOLUTION ORAL at 09:20

## 2021-01-01 RX ADMIN — INSULIN LISPRO 20 UNITS: 100 INJECTION, SOLUTION INTRAVENOUS; SUBCUTANEOUS at 16:00

## 2021-01-01 RX ADMIN — SODIUM CHLORIDE, PRESERVATIVE FREE 10 ML: 5 INJECTION INTRAVENOUS at 08:26

## 2021-01-01 RX ADMIN — INSULIN LISPRO 6 UNITS: 100 INJECTION, SOLUTION INTRAVENOUS; SUBCUTANEOUS at 17:07

## 2021-01-01 RX ADMIN — SODIUM CHLORIDE, PRESERVATIVE FREE 10 ML: 5 INJECTION INTRAVENOUS at 09:51

## 2021-01-01 RX ADMIN — OXYCODONE HYDROCHLORIDE AND ACETAMINOPHEN 500 MG: 500 TABLET ORAL at 11:21

## 2021-01-01 RX ADMIN — SODIUM CHLORIDE, PRESERVATIVE FREE 10 ML: 5 INJECTION INTRAVENOUS at 08:35

## 2021-01-01 RX ADMIN — ENOXAPARIN SODIUM 30 MG: 30 INJECTION SUBCUTANEOUS at 20:04

## 2021-01-01 RX ADMIN — TRAMADOL HYDROCHLORIDE 50 MG: 50 TABLET, COATED ORAL at 17:19

## 2021-01-01 RX ADMIN — FERROUS SULFATE TAB EC 325 MG (65 MG FE EQUIVALENT) 325 MG: 325 (65 FE) TABLET DELAYED RESPONSE at 08:47

## 2021-01-01 RX ADMIN — OXYCODONE HYDROCHLORIDE AND ACETAMINOPHEN 500 MG: 500 TABLET ORAL at 21:10

## 2021-01-01 RX ADMIN — DEXAMETHASONE SODIUM PHOSPHATE 10 MG: 10 INJECTION INTRAMUSCULAR; INTRAVENOUS at 08:16

## 2021-01-01 RX ADMIN — POTASSIUM CHLORIDE 20 MEQ: 1500 TABLET, EXTENDED RELEASE ORAL at 15:00

## 2021-01-01 RX ADMIN — FAMOTIDINE 20 MG: 20 TABLET, FILM COATED ORAL at 08:59

## 2021-01-01 RX ADMIN — DEXAMETHASONE SODIUM PHOSPHATE 20 MG: 4 INJECTION, SOLUTION INTRAMUSCULAR; INTRAVENOUS at 17:07

## 2021-01-01 RX ADMIN — GUAIFENESIN DEXTROMETHORPHAN HYDROBROMIDE ORAL SOLUTION 10 ML: 200; 20 SOLUTION ORAL at 21:39

## 2021-01-01 RX ADMIN — GUAIFENESIN DEXTROMETHORPHAN HYDROBROMIDE ORAL SOLUTION 10 ML: 200; 20 SOLUTION ORAL at 21:15

## 2021-01-01 RX ADMIN — OXYCODONE HYDROCHLORIDE AND ACETAMINOPHEN 500 MG: 500 TABLET ORAL at 08:26

## 2021-01-01 RX ADMIN — SODIUM CHLORIDE, PRESERVATIVE FREE 10 ML: 5 INJECTION INTRAVENOUS at 21:17

## 2021-01-01 RX ADMIN — Medication 81 MG: at 08:26

## 2021-01-01 RX ADMIN — ALPRAZOLAM 0.25 MG: 0.25 TABLET ORAL at 21:05

## 2021-01-01 RX ADMIN — FLUTICASONE PROPIONATE 1 SPRAY: 50 SPRAY, METERED NASAL at 08:29

## 2021-01-01 RX ADMIN — FLUTICASONE PROPIONATE 1 SPRAY: 50 SPRAY, METERED NASAL at 08:55

## 2021-01-01 RX ADMIN — INSULIN LISPRO 15 UNITS: 100 INJECTION, SOLUTION INTRAVENOUS; SUBCUTANEOUS at 16:19

## 2021-01-01 RX ADMIN — CELECOXIB 50 MG: 50 CAPSULE ORAL at 20:41

## 2021-01-01 RX ADMIN — INSULIN LISPRO 6 UNITS: 100 INJECTION, SOLUTION INTRAVENOUS; SUBCUTANEOUS at 13:30

## 2021-01-01 RX ADMIN — DEXAMETHASONE SODIUM PHOSPHATE 10 MG: 10 INJECTION INTRAMUSCULAR; INTRAVENOUS at 08:59

## 2021-01-01 RX ADMIN — FUROSEMIDE 20 MG: 10 INJECTION, SOLUTION INTRAMUSCULAR; INTRAVENOUS at 08:35

## 2021-01-01 RX ADMIN — SODIUM CHLORIDE, PRESERVATIVE FREE 10 ML: 5 INJECTION INTRAVENOUS at 08:48

## 2021-01-01 RX ADMIN — INSULIN LISPRO 3 UNITS: 100 INJECTION, SOLUTION INTRAVENOUS; SUBCUTANEOUS at 09:00

## 2021-01-01 RX ADMIN — ALBUTEROL SULFATE 2 PUFF: 90 AEROSOL, METERED RESPIRATORY (INHALATION) at 12:04

## 2021-01-01 RX ADMIN — DEXTROSE MONOHYDRATE 12.5 G: 25 INJECTION, SOLUTION INTRAVENOUS at 06:24

## 2021-01-01 RX ADMIN — SODIUM CHLORIDE, PRESERVATIVE FREE 10 ML: 5 INJECTION INTRAVENOUS at 11:22

## 2021-01-01 RX ADMIN — FLUTICASONE PROPIONATE 1 SPRAY: 50 SPRAY, METERED NASAL at 09:51

## 2021-01-01 RX ADMIN — FUROSEMIDE 20 MG: 10 INJECTION, SOLUTION INTRAMUSCULAR; INTRAVENOUS at 10:44

## 2021-01-01 RX ADMIN — SODIUM CHLORIDE, PRESERVATIVE FREE 10 ML: 5 INJECTION INTRAVENOUS at 21:45

## 2021-01-01 RX ADMIN — INSULIN GLARGINE 50 UNITS: 100 INJECTION, SOLUTION SUBCUTANEOUS at 11:56

## 2021-01-01 RX ADMIN — INSULIN LISPRO 9 UNITS: 100 INJECTION, SOLUTION INTRAVENOUS; SUBCUTANEOUS at 08:00

## 2021-01-01 ASSESSMENT — PAIN DESCRIPTION - PAIN TYPE
TYPE: ACUTE PAIN

## 2021-01-01 ASSESSMENT — ENCOUNTER SYMPTOMS
DIARRHEA: 1
APNEA: 0
COUGH: 1
SHORTNESS OF BREATH: 1
VOICE CHANGE: 0
VOICE CHANGE: 0
ABDOMINAL PAIN: 0
RHINORRHEA: 0
VOMITING: 0
NAUSEA: 0
SHORTNESS OF BREATH: 1
EYE PAIN: 0
TROUBLE SWALLOWING: 0
SORE THROAT: 0
PHOTOPHOBIA: 0
COUGH: 1
RHINORRHEA: 0
SHORTNESS OF BREATH: 1
GASTROINTESTINAL NEGATIVE: 1
NAUSEA: 0
ABDOMINAL PAIN: 0
SORE THROAT: 0
ALLERGIC/IMMUNOLOGIC NEGATIVE: 1
WHEEZING: 0
TROUBLE SWALLOWING: 0
COUGH: 1
TROUBLE SWALLOWING: 0
NAUSEA: 0
DIARRHEA: 0
WHEEZING: 0
GASTROINTESTINAL NEGATIVE: 1
WHEEZING: 0
ALLERGIC/IMMUNOLOGIC NEGATIVE: 1
EYE PAIN: 0
VOMITING: 0
CONSTIPATION: 0
WHEEZING: 0
EYE REDNESS: 0
PHOTOPHOBIA: 0
SHORTNESS OF BREATH: 1
SHORTNESS OF BREATH: 1
PHOTOPHOBIA: 0
EYE REDNESS: 0
EYE REDNESS: 0
SORE THROAT: 0
RHINORRHEA: 0
ALLERGIC/IMMUNOLOGIC NEGATIVE: 1
CONSTIPATION: 0
PHOTOPHOBIA: 0
SORE THROAT: 0
TROUBLE SWALLOWING: 0
NAUSEA: 0
SHORTNESS OF BREATH: 1
ALLERGIC/IMMUNOLOGIC NEGATIVE: 1
COLOR CHANGE: 0
EYE REDNESS: 0
APNEA: 0
ALLERGIC/IMMUNOLOGIC NEGATIVE: 1
VOICE CHANGE: 0
DIARRHEA: 0
ABDOMINAL DISTENTION: 0
EYE REDNESS: 0
COUGH: 1
GASTROINTESTINAL NEGATIVE: 1
CONSTIPATION: 0
COUGH: 1
WHEEZING: 0
GASTROINTESTINAL NEGATIVE: 1
COLOR CHANGE: 0
NAUSEA: 0
ABDOMINAL PAIN: 0
TROUBLE SWALLOWING: 0
ABDOMINAL DISTENTION: 0
NAUSEA: 0
ALLERGIC/IMMUNOLOGIC NEGATIVE: 1
GASTROINTESTINAL NEGATIVE: 1
RHINORRHEA: 0
GASTROINTESTINAL NEGATIVE: 1
DIARRHEA: 0
VOICE CHANGE: 0
TROUBLE SWALLOWING: 0
TROUBLE SWALLOWING: 0
VOICE CHANGE: 0
COUGH: 1
ABDOMINAL PAIN: 0
COLOR CHANGE: 0
ABDOMINAL PAIN: 0
SHORTNESS OF BREATH: 1
WHEEZING: 0
DIARRHEA: 0
RHINORRHEA: 0
CONSTIPATION: 0
COLOR CHANGE: 0
SHORTNESS OF BREATH: 1
VOICE CHANGE: 0
ALLERGIC/IMMUNOLOGIC NEGATIVE: 1
DIARRHEA: 0
VOMITING: 0
SORE THROAT: 0
ABDOMINAL PAIN: 0
ALLERGIC/IMMUNOLOGIC NEGATIVE: 1
DIARRHEA: 0
DIARRHEA: 0
SORE THROAT: 0
WHEEZING: 0
TACHYPNEA: 1
ALLERGIC/IMMUNOLOGIC NEGATIVE: 1
SORE THROAT: 0
ABDOMINAL DISTENTION: 0
SHORTNESS OF BREATH: 1
GASTROINTESTINAL NEGATIVE: 1
SHORTNESS OF BREATH: 1
VOMITING: 0
RHINORRHEA: 0
ABDOMINAL DISTENTION: 0
COUGH: 1
COLOR CHANGE: 0
VOICE CHANGE: 0
ABDOMINAL PAIN: 0
CONSTIPATION: 0
COUGH: 1
WHEEZING: 0
ABDOMINAL DISTENTION: 0
WHEEZING: 0
COUGH: 1
VOMITING: 0
DIARRHEA: 0
SHORTNESS OF BREATH: 1
ABDOMINAL DISTENTION: 0
COUGH: 1
VOMITING: 0
DIARRHEA: 0
TROUBLE SWALLOWING: 0
TROUBLE SWALLOWING: 0
ALLERGIC/IMMUNOLOGIC NEGATIVE: 1
EYE REDNESS: 0
NAUSEA: 0
VOMITING: 0
STRIDOR: 0
SHORTNESS OF BREATH: 1
COLOR CHANGE: 0
COLOR CHANGE: 0
WHEEZING: 0
EYE PAIN: 0
SHORTNESS OF BREATH: 1
RHINORRHEA: 0
CONSTIPATION: 0
ALLERGIC/IMMUNOLOGIC NEGATIVE: 1
RHINORRHEA: 0
EYE REDNESS: 0
WHEEZING: 0
VOMITING: 0
VOMITING: 0
ABDOMINAL PAIN: 0
SORE THROAT: 0
COUGH: 1
CONSTIPATION: 0
TROUBLE SWALLOWING: 0
EYE DISCHARGE: 0
DIARRHEA: 0
NAUSEA: 0
DIARRHEA: 0
VOMITING: 0
NAUSEA: 0
SHORTNESS OF BREATH: 1
CONSTIPATION: 0
DIARRHEA: 0
NAUSEA: 0
RHINORRHEA: 0
RHINORRHEA: 0
NAUSEA: 1
COLOR CHANGE: 0
CONSTIPATION: 0
SHORTNESS OF BREATH: 1
SORE THROAT: 0
EYE ITCHING: 0
ABDOMINAL PAIN: 0
EYE REDNESS: 0
COUGH: 1
RHINORRHEA: 0
SHORTNESS OF BREATH: 1
VOMITING: 0
SHORTNESS OF BREATH: 1
VOMITING: 0
VOMITING: 0
WHEEZING: 0
RHINORRHEA: 0
COUGH: 1
ALLERGIC/IMMUNOLOGIC NEGATIVE: 1
VOICE CHANGE: 0
TROUBLE SWALLOWING: 0
VOICE CHANGE: 0
CONSTIPATION: 0
SORE THROAT: 0
ABDOMINAL PAIN: 0
RHINORRHEA: 0
ABDOMINAL PAIN: 0
SORE THROAT: 0
TROUBLE SWALLOWING: 0
CONSTIPATION: 0
NAUSEA: 0
BACK PAIN: 0
NAUSEA: 0
DIARRHEA: 0
VOMITING: 0
SHORTNESS OF BREATH: 1
CHEST TIGHTNESS: 0
EYE REDNESS: 0
EYE PAIN: 0
CONSTIPATION: 0
COUGH: 1
NAUSEA: 0
ABDOMINAL PAIN: 0
NAUSEA: 0
TROUBLE SWALLOWING: 0
EYE REDNESS: 0
TROUBLE SWALLOWING: 0
CONSTIPATION: 0
CONSTIPATION: 0
NAUSEA: 0
TROUBLE SWALLOWING: 0
RHINORRHEA: 0
ABDOMINAL DISTENTION: 0
ABDOMINAL PAIN: 0
CONSTIPATION: 0
SORE THROAT: 0
WHEEZING: 0
ALLERGIC/IMMUNOLOGIC NEGATIVE: 1
VOICE CHANGE: 0
SORE THROAT: 0
ABDOMINAL PAIN: 0
VOICE CHANGE: 0
GASTROINTESTINAL NEGATIVE: 1
SORE THROAT: 0
SORE THROAT: 0
DIARRHEA: 1
EYE REDNESS: 0
EYE PAIN: 0
ABDOMINAL PAIN: 0
EYE REDNESS: 0
VOICE CHANGE: 0
CHEST TIGHTNESS: 0
BLOOD IN STOOL: 0
VOMITING: 0
COUGH: 1
WHEEZING: 0
DIARRHEA: 0
ALLERGIC/IMMUNOLOGIC NEGATIVE: 1
CHEST TIGHTNESS: 0
VOICE CHANGE: 0
VOMITING: 0
APNEA: 0
EYE REDNESS: 0
ABDOMINAL PAIN: 0
CHEST TIGHTNESS: 0
COUGH: 1
RHINORRHEA: 0
ABDOMINAL DISTENTION: 0
APNEA: 0
WHEEZING: 0
EYE REDNESS: 0
VOICE CHANGE: 0

## 2021-01-01 ASSESSMENT — PAIN SCALES - GENERAL
PAINLEVEL_OUTOF10: 8
PAINLEVEL_OUTOF10: 8
PAINLEVEL_OUTOF10: 0
PAINLEVEL_OUTOF10: 8
PAINLEVEL_OUTOF10: 0
PAINLEVEL_OUTOF10: 8
PAINLEVEL_OUTOF10: 0
PAINLEVEL_OUTOF10: 8
PAINLEVEL_OUTOF10: 0
PAINLEVEL_OUTOF10: 2
PAINLEVEL_OUTOF10: 8
PAINLEVEL_OUTOF10: 0
PAINLEVEL_OUTOF10: 8
PAINLEVEL_OUTOF10: 0
PAINLEVEL_OUTOF10: 0
PAINLEVEL_OUTOF10: 1
PAINLEVEL_OUTOF10: 0
PAINLEVEL_OUTOF10: 6
PAINLEVEL_OUTOF10: 0
PAINLEVEL_OUTOF10: 8
PAINLEVEL_OUTOF10: 3
PAINLEVEL_OUTOF10: 7
PAINLEVEL_OUTOF10: 0
PAINLEVEL_OUTOF10: 8
PAINLEVEL_OUTOF10: 0
PAINLEVEL_OUTOF10: 8
PAINLEVEL_OUTOF10: 0
PAINLEVEL_OUTOF10: 3
PAINLEVEL_OUTOF10: 0
PAINLEVEL_OUTOF10: 8
PAINLEVEL_OUTOF10: 0
PAINLEVEL_OUTOF10: 6
PAINLEVEL_OUTOF10: 8
PAINLEVEL_OUTOF10: 0
PAINLEVEL_OUTOF10: 1
PAINLEVEL_OUTOF10: 0
PAINLEVEL_OUTOF10: 8
PAINLEVEL_OUTOF10: 0
PAINLEVEL_OUTOF10: 0
PAINLEVEL_OUTOF10: 6
PAINLEVEL_OUTOF10: 0
PAINLEVEL_OUTOF10: 9

## 2021-01-01 ASSESSMENT — PAIN SCALES - WONG BAKER

## 2021-01-01 ASSESSMENT — PAIN DESCRIPTION - ORIENTATION
ORIENTATION: RIGHT

## 2021-01-01 ASSESSMENT — PAIN DESCRIPTION - LOCATION
LOCATION: KNEE

## 2021-01-01 ASSESSMENT — PAIN DESCRIPTION - PROGRESSION: CLINICAL_PROGRESSION: GRADUALLY WORSENING

## 2021-01-01 ASSESSMENT — PAIN DESCRIPTION - FREQUENCY: FREQUENCY: CONTINUOUS

## 2021-09-21 PROBLEM — J12.82 PNEUMONIA DUE TO COVID-19 VIRUS: Status: ACTIVE | Noted: 2021-01-01

## 2021-09-21 PROBLEM — U07.1 PNEUMONIA DUE TO COVID-19 VIRUS: Status: ACTIVE | Noted: 2021-01-01

## 2021-09-21 NOTE — LETTER
NOTIFICATION RETURN TO WORK / SCHOOL    10/2/2021    Ms. Gin Raymundo  [address] [facility] [address]      To Whom It May Concern:    Gin Raymundo was tested for COVID-19 on 9/21, and the result was positive. She has been admitted to our facility since September 21st. She will be quarantined through October 11th. She may even be in our facility or another healthcare facility for the next several days. For her's and other's safety, please excuse he from her upcoming court date. If there are questions or concerns, please have the patient contact our office.         Sincerely,    Aristides Cummings, [address]  Phone: [phone]  Fax: [phone]

## 2021-09-22 PROBLEM — A41.9 SEPSIS (HCC): Status: ACTIVE | Noted: 2021-01-01

## 2021-09-22 PROBLEM — R79.82 ELEVATED C-REACTIVE PROTEIN (CRP): Status: ACTIVE | Noted: 2021-01-01

## 2021-09-22 NOTE — PROGRESS NOTES
[facility]  Office: [address], DO, Zenia Tierra, DO, John St, DO, Wendy Lopez, DO, Valiant Sicard, MD, Rogelio Neves MD, Valeriy Banegas MD, Rogelio Lewis MD, Parish Howell MD, Sincere Rosales MD, Sebastien Paige MD, Marco Miner, DO, Michelle Escalera, DO, Jennifer Menendez MD,  Nakul Palm, DO, Carola Hernández MD, Angie Viveros MD, Rylee Orta MD, Alcon Peña MD, , Patric Rapp MD, Phil Cotto MD, Donya Galicia MD, Anthony Bob, Rodrick Montejo, CNP, Nela Arroyo, CNP, Nickolas Pablo, CNS, She Gutierrez, CNP, Shaila Light, CNP, Fanny Ochoa, CNP, France Troncoso, CNP, Goldie Zaldivar, CNP, CHRISTIE Ordonez-C, Nakul Garcia, [facility], Jb Bardales, CNP, Willy Watts, CNP, Charla Mehta, CNP, Ortega Ferraro, CNP, Rukhsana Reis, CNP, Perla Martínez, CNP, Cherelle Hannon, [facility], Elizalde Flair, [address] [place] [address]    Progress Note    9/22/2021    11:56 AM    Name:   Desire Sánchez  MRN:     [id number]     Acct:      [de-identified]   Room:   2021/2021-01   Day:  1  Admit Date:  9/21/2021  8:43 PM    PCP:   Amol Williamson  Code Status:  Full Code    Subjective:     C/C:   Chief Complaint   Patient presents with   Jacqui Matthew Other     COVID+    Shortness of Breath     Interval History Status: worsened. Rapid response called and patient was evaluated earlier around 10 AM this morning due to worsening hypoxia on 40 L/100% FiO2 high flow. O2 saturations were in the mid 80s, Bipap was ordered and the patient was also placed on nonrebreather while maxed out on high flow with O2 saturations in the upper 90s. Despite the shortness of breath and cough patient has no other symptomology at this time denying chest pain, nausea, vomiting, diarrhea, constipation, fever, chills, urinary symptoms or pain    Brief History:     Desire Sánchez is a 45 y.o.  Non- / non  female who presents with Other (COVID+) and Shortness of Breath   and is admitted to the hospital for the management of Covid 19 Pneumonia      Patient presented to the emergency room for the concern of worsening COVID-19 Symptoms. Patient was originally seen Patient was originally diagnosed with the COVID 4 days prior. Patient states since diagnosis she has been having increased shortness of breath, cough  With water-like diarrhea. Upon arrival to the emergency room patient was noted to be hypoxic with saturations in the 80's  she required 6 lpm nc supplemental oxygen.         Review of Systems:     Constitutional:  negative for chills, +fevers, -sweats  Respiratory:  +cough, +dyspnea on exertion, +shortness of breath,  No wheezing  Cardiovascular:  negative for chest pain, chest pressure/discomfort, lower extremity edema, palpitations  Gastrointestinal:  negative for abdominal pain, constipation, diarrhea, nausea, vomiting  Neurological:  negative for dizziness, headache    Medications: Allergies:     Allergies   Allergen Reactions    Cephalexin      Gets yeast infection    Codeine        Current Meds:   Scheduled Meds:    albuterol sulfate HFA  2 puff Inhalation 4x daily    And    ipratropium  2 puff Inhalation 4x daily    insulin glargine  10 Units SubCUTAneous BID    insulin lispro  0-18 Units SubCUTAneous TID WC    insulin lispro  0-9 Units SubCUTAneous Nightly    famotidine  20 mg Oral Daily    aspirin  81 mg Oral Daily    ferrous sulfate  325 mg Oral Daily with breakfast    sodium chloride flush  5-40 mL IntraVENous 2 times per day    enoxaparin  30 mg SubCUTAneous BID    Vitamin D  2,000 Units Oral Daily    dexamethasone  6 mg IntraVENous Q24H    [START ON 9/23/2021] remdesivir IVPB  100 mg IntraVENous Q24H     Continuous Infusions:    dextrose      sodium chloride       PRN Meds: potassium chloride **OR** potassium alternative oral replacement **OR** potassium chloride, magnesium sulfate, albuterol sulfate HFA, glucose, dextrose, glucagon (rDNA), dextrose, sodium chloride flush, sodium chloride, ondansetron **OR** ondansetron, magnesium hydroxide, acetaminophen **OR** acetaminophen, dextromethorphan-guaiFENesin, sodium chloride    Data:     Past Medical History:   has a past medical history of Anemia, Chronic back pain, Diabetes mellitus ([address] H/O LEEP, Hypertension, and S/P endometrial ablation. Social History:   reports that she has never smoked. She has never used smokeless tobacco. She reports current alcohol use. She reports that she does not use drugs. Family History:   Family History   Problem Relation Age of Onset    Hypotension Mother     Heart Disease Father     Heart Failure Father        Vitals:  /84   Pulse 83   Temp 97.9 °F (36.6 °C) (Axillary)   Resp 26   Ht 5' 3\" (1.6 m)   Wt 270 lb 4.8 oz (122.6 kg)   SpO2 94%   BMI 47.88 kg/m²   Temp (24hrs), Av.1 °F (37.3 °C), Min:97.9 °F (36.6 °C), Max:100.3 °F (37.9 °C)    Recent Labs     21  0146 21  0950 21  1003   POCGLU 375* 456* 495*       I/O (24Hr):     Intake/Output Summary (Last 24 hours) at 2021 1156  Last data filed at 2021 8296  Gross per 24 hour   Intake 580 ml   Output    Net 580 ml       Labs:  Hematology:  Recent Labs     21  0624   WBC 4.5 3.6   RBC 5.29* 4.91   HGB 15.6* 14.3   HCT 45.7 42.0   MCV 86.4 85.5   MCH 29.5 29.1   MCHC 34.1 34.0   RDW 13.2 13.1    169   MPV 10.7 10.7   .7* 205.8*   DDIMER 0.54  --      Chemistry:  Recent Labs     21  0624    135   K 3.5* 4.5   CL 93* 96*   CO2 22 20   GLUCOSE 368* 469*   BUN 14 16   CREATININE 0.92* 0.83   MG 1.7  --    ANIONGAP 20* 19*   LABGLOM >60 >60   GFRAA >60 >60   CALCIUM 8.2* 8.0*     Recent Labs     21  0146 21  0950 21  1003   PROT 7.0  --   --   --    LABALBU 3.6  --   --   --    AST 48*  --   --   --    ALT 38*  --   --   --    ALKPHOS 84  -- --   --    BILITOT 0.54  --   --   --    BILIDIR 0.19  --   --   --    POCGLU  --  375* 456* 495*         Radiology:  XR CHEST PORTABLE    Result Date: 9/21/2021  Multifocal airspace opacities worrisome multifocal atypical viral pneumonia. Low lung volumes       Physical Examination:        General appearance:  alert, cooperative and no distress  Mental Status:  oriented to person, place and time and normal affect  Lungs: Tachypneic, diminished posteriorly, low inspiratory effort, on NRB and high flow nasal cannula  Heart:  regular rate and rhythm, no murmur  Abdomen: obese, soft, nontender, nondistended, normal bowel sounds, no masses, hepatomegaly, splenomegaly  Extremities:  no edema, redness, tenderness in the calves  Skin:  no gross lesions, rashes, induration    Assessment:        Hospital Problems         Last Modified POA    Pneumonia due to COVID-19 virus 9/21/2021 Yes    Hypertension 9/22/2021 Yes    Diabetes mellitus ([facility] [address] 9/22/2021 Yes    Elevated C-reactive protein (CRP) 9/22/2021 Yes          Plan:        COVID-19 viral pneumonia:   - Infectious disease consulted. - Continue isolation precautions. - Decadron day 2/10  high flow/BiPAP as warranted. - Remdesivir started in the emergency department    Acute hypoxic respiratory failure:   - Guarded. - Patient requiring increased oxygenation this morning after rapid response was called for hypoxia.    - Initially maxed out on high flow with nonrebreather  -  transition to BiPAP  - pulmonary consulted    Sepsis from covid: Secondary to #1    Elevated CRP: Uptrending since admission    Essential hypertension: Currently stable    Diabetes mellitus type 2:   - Increase Lantus to 10 units twice daily  -  increase sliding scale coverage to high intensity  - extra dose of 20 units this morning for blood sugar greater than 400. - If blood sugar remains over 400 will initiate insulin gtt.    - complicated by steroid administration.    - Patient states she was supposed to be taking Metformin at home but has not started this yet.       Morbid obesity: Weight loss encouraged    GI/DVT prophylaxis: Start Pepcid, continue Lovenox twice daily    SONJA Chun NP  9/22/2021  11:56 AM

## 2021-09-22 NOTE — PROGRESS NOTES
Occupational Therapy   Occupational Therapy Initial Assessment  Date: 2021   Patient Name: Tawana Pitt  MRN: [id number]     : 1980    Date of Service: 2021    Discharge Recommendations:  Patient would benefit from continued therapy after discharge  OT Equipment Recommendations  Equipment Needed: Yes  Mobility Devices: ADL Assistive Devices  ADL Assistive Devices: Sock-Aid Hard;Reacher;Grab Bars - shower; Shower Chair with back;Long-handled Sponge  Other: CTA as pt is able to be assessed further completing ADLs/func mobility    Assessment   Performance deficits / Impairments: Decreased functional mobility ; Decreased ADL status; Decreased endurance;Decreased high-level IADLs  Assessment: Pt demonstrated sit<>supine with SBA and use of bedrails. SBA statically EOB for ~10 minutes. Pt on highflow oxygen and once pt reached upright sitting position EOB her oxygen levels desaturated to 84-85%. Pt educated on use of pursed lip breathing tech and with +5 minutes to recover, pt oxygen decreased to 80-84%. RN present in room and with respiratory's instruction increased high flow from 30L to 70L. Oxygen did not consistently increased despite increasing high flow. Rapid response was called and pt returned supine. Pt is expected to require skilled OT services during their acute hospitalization stay to address the above noted deficits through skilled occupational therapy intervention for promotion of increased independence throughout ADLs, IADLs and functional mobility tasks.    Prognosis: Good  Decision Making: Medium Complexity  OT Education: OT Role;Energy Conservation;Plan of Care  Patient Education: use of pursed lip breathing tech, use of rest breaks-good return  REQUIRES OT FOLLOW UP: Yes  Activity Tolerance  Activity Tolerance: Treatment limited secondary to medical complications (free text)  Activity Tolerance: Limited by oxygen level desaturating  Safety Devices  Safety Devices in place: Yes  Type of devices: Gait belt;Left in bed;Call light within reach;Nurse notified (Left with RN in room and rapid response team entering room)  Restraints  Initially in place: No           Patient Diagnosis(es): The encounter diagnosis was COVID-19.     has a past medical history of Anemia, Chronic back pain, Diabetes mellitus ([address] H/O LEEP, Hypertension, and S/P endometrial ablation. has a past surgical history that includes LEEP and Endometrial ablation. Restrictions  Restrictions/Precautions  Restrictions/Precautions: Isolation, Up as Tolerated, Fall Risk (COVID+)  Required Braces or Orthoses?: No    Subjective   General  Patient assessed for rehabilitation services?: Yes  Family / Caregiver Present: No  General Comment  Comments: RN ok'd for OT/PT eval this AM. Pt agreeable to session, pleasent/cooperative throughout.   Patient Currently in Pain: Denies  Vital Signs  Patient Currently in Pain: Denies  Oxygen Therapy  Blood Gas  Performed?: Yes  Papi's Test #1: Collateral flow confirmed  Site #1: Right Radial  Site Prepped #1: Yes  Number of Attempts #1: 1  Pressure Held #1: Yes  Complications #1: None  Post-procedure #1: Standard  Specimen Status #1: Point of care  How Tolerated?: Tolerated well  Social/Functional History  Social/Functional History  Lives With: Family (15, 15, 5, 10 yo kids)  Type of Home: House  Home Layout: Two level, Performs ADL's on one level, Able to Live on Main level with bedroom/bathroom, Laundry in basement  Home Access: Stairs to enter with rails  Entrance Stairs - Number of Steps: 2  Entrance Stairs - Rails: Both  Bathroom Shower/Tub: Tub/Shower unit  Bathroom Toilet: Standard  Home Equipment:  (no DME)  ADL Assistance: Independent  Homemaking Assistance: Independent  Homemaking Responsibilities: Yes  Ambulation Assistance: Independent  Transfer Assistance: Independent  Active : Yes  Mode of Transportation: SUV  Occupation: Full time employment  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 Objective   Vision: Impaired  Vision Exceptions: Wears glasses at all times  Hearing: Within functional limits    Orientation  Overall Orientation Status: Within Functional Limits     Balance  Sitting Balance: Once pt in upright sitting position EOB her oxygen levels desaturated to 84-85%. Pt educated on use of pursed lip breathing tech and with +5 minutes to recover, pt oxygen decreased to 80-84%. RN present in room and with respiratory's instruction increased high flow from 30L to 70L. Oxygen did not consistently increased despite increasing high flow. Rapid response was called and pt returned supine. Pt sat unsupported with SBA for ~15 minutes total.   Standing Balance: Unable to assess(comment)  Standing Balance  Comment: Unable to assess d/t oxygen levels, rapid response called by RN, RN present in room. Functional Mobility  Functional Mobility Comments: Unable to assess d/t oxygen levels, rapid response called by RN, RN present in room. ADL  Feeding: Independent  Grooming: Increased time to complete;Modified independent   UE Bathing: Stand by assistance;Setup; Increased time to complete  LE Bathing: Contact guard assistance;Setup; Increased time to complete  UE Dressing: Supervision;Setup  LE Dressing: Setup; Increased time to complete;Contact guard assistance  Toileting: Contact guard assistance;Setup; Increased time to complete  Additional Comments: Unable to assess any ADLs this date d/t oxygen levels desaturating upon sitting EOB. Monitored closely with RN in room, not appropriate to attempt. Returned supine and rapid response called.   Tone RUE  RUE Tone: Normotonic  Tone LUE  LUE Tone: Normotonic  Coordination  Movements Are Fluid And Coordinated: Yes     Bed mobility  Supine to Sit: Stand by assistance  Sit to Supine: Stand by assistance  Scooting: Stand by assistance  Comment: Bedrails utilized, HOB elevated, increased time/effort, oxygen levels decreased despite breathing techniques, see sitting balance section for more detail. Transfers  Sit to stand: Unable to assess  Stand to sit: Unable to assess  Transfer Comments: Unable to assess d/t oxygen levels, rapid response called by RN, RN present in room.      Cognition  Overall Cognitive Status: WFL        Sensation  Overall Sensation Status: WFL        LUE AROM (degrees)  LUE General AROM: Appears WFL, not formally assessed d/t oxygen level  Left Hand AROM (degrees)  Left Hand AROM: WFL  RUE AROM (degrees)  RUE General AROM: Appears WFL, not formally assessed d/t oxygen level  Right Hand AROM (degrees)  Right Hand AROM: WFL  LUE Strength  LUE Strength Comment: not formally assessed d/t oxygen level  RUE Strength  RUE Strength Comment: not formally assessed d/t oxygen level                   Plan   Plan  Times per week: 2-4x/week  Current Treatment Recommendations: Safety Education & Training, Patient/Caregiver Education & Training, Self-Care / ADL, Functional Mobility Training, Equipment Evaluation, Education, & procurement, Home Management Training, Endurance Training                                   AM-PAC Score        AM-[facility] Inpatient Daily Activity Raw Score: 21 (09/22/21 1151)  AM-PAC Inpatient ADL T-Scale Score : 44.27 (09/22/21 1151)  ADL Inpatient CMS 0-100% Score: 32.79 (09/22/21 1151)  ADL Inpatient CMS G-Code Modifier : Euniceann Rodrigues (09/22/21 1151)    Goals  Short term goals  Time Frame for Short term goals: By discharge pt will:  Short term goal 1: Demo bed mobility IND with HOB flat to mimic home setup  Short term goal 2: Demo +10 minutes of dynamic sitting balance throughout ADL tasks with SUP  Short term goal 3: Demo UB ADLs with Mod IND  Short term goal 4: Demo LB ADLs with SUP, use of DME PRN  Short term goal 5: Demo use of pursed lip breathing technique IND throughout all functional tasks PRN with 0 VCs for intiation  Short term goal 6: Notify OTR/L when goals are appropriate to progress       Therapy Time   Individual Concurrent Group Co-treatment   Time In 0919         Time Out 0949         Minutes 30         Timed Code Treatment Minutes: 9 Minutes       Lucero Rogel, OTR/L

## 2021-09-22 NOTE — ED NOTES
Patient presents to ED with c/o shortness of breath over the last 3 days, patient states she developed cough last week and tested positive for COVID on Saturday, patient reports worsening shortness of breath, diarrhea, chest pain with cough. Patient currently alert and oriented x 3, resp even/rapid/shallow, skin PWD, speaking in short sentences.      Vish Cloud RN  09/21/21 9196

## 2021-09-22 NOTE — PLAN OF CARE
Problem: Airway Clearance - Ineffective  Goal: Achieve or maintain patent airway  Outcome: Ongoing     Problem: Breathing Pattern - Ineffective  Goal: Ability to achieve and maintain a regular respiratory rate  Outcome: Ongoing     Problem: Isolation Precautions - Risk of Spread of Infection  Goal: Prevent transmission of infection  Outcome: Ongoing     Will continue to monitor

## 2021-09-22 NOTE — PROGRESS NOTES
Physical Therapy    Facility/Department: [facility]  Initial Assessment    NAME: Kenia Hawkins  : 1980  MRN: [id number]  Chief Complaint   Patient presents with    Other     COVID+    Shortness of Breath     Date of Service: 2021    Discharge Recommendations: Further therapy recommended at discharge, continue to assess pending progress. PT Equipment Recommendations  Equipment Needed:  (CTA, unbale to assess transfers/ambulation this date)    Assessment   Body structures, Functions, Activity limitations: Decreased functional mobility ; Decreased balance;Decreased endurance;Decreased ADL status  Assessment: The pt performed bed mobility with SBA, unable to assess transfers or ambulation due to respiratory status, rapid response called and RN present. Will continue to assess and progress mobility as appropriate. Prognosis: Good  Decision Making: Medium Complexity  PT Education: Goals;PT Role;Plan of Care; Functional Mobility Training  REQUIRES PT FOLLOW UP: Yes  Activity Tolerance  Activity Tolerance: Patient limited by endurance       Patient Diagnosis(es): The encounter diagnosis was COVID-19.     has a past medical history of Anemia, Chronic back pain, Diabetes mellitus ([address] H/O LEEP, Hypertension, and S/P endometrial ablation. has a past surgical history that includes LEEP and Endometrial ablation. Restrictions  Restrictions/Precautions  Restrictions/Precautions: Isolation, Up as Tolerated, Fall Risk (COVID+)  Required Braces or Orthoses?: No  Vision/Hearing  Vision: Impaired  Vision Exceptions: Wears glasses at all times  Hearing: Within functional limits     Subjective  General  Patient assessed for rehabilitation services?: Yes  Response To Previous Treatment: Not applicable  Family / Caregiver Present: No  Follows Commands: Within Functional Limits  General Comment  Comments: High flow O2  Subjective  Subjective: RN and pt agreeable to PT.  Pt supine in bed upon arrival, very pleasant and cooperative throughout. Pain Screening  Patient Currently in Pain: Denies  Vital Signs  Patient Currently in Pain: Denies       Orientation  Orientation  Overall Orientation Status: Within Functional Limits  Social/Functional History  Social/Functional History  Lives With: Family (15, 15, 5, 10 yo kids)  Type of Home: House  Home Layout: Two level, Performs ADL's on one level, Able to Live on Main level with bedroom/bathroom, Laundry in basement  Home Access: Stairs to enter with rails  Entrance Stairs - Number of Steps: 2  Entrance Stairs - Rails: Both  Bathroom Shower/Tub: Tub/Shower unit  Bathroom Toilet: Standard  Home Equipment:  (no DME)  ADL Assistance: Independent  Homemaking Assistance: Independent  Homemaking Responsibilities: Yes  Ambulation Assistance: Independent  Transfer Assistance: Independent  Active : Yes  Mode of Transportation: SUV  Occupation: Full time employment  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   Cognition   Cognition  Overall Cognitive Status: WFL    Objective          Joint Mobility  Spine: WFL  ROM RLE: WFL  ROM LLE: WFL  ROM RUE: WFL  ROM LUE: WFL  Strength RLE  Strength RLE: WFL  Strength LLE  Strength LLE: WFL  Strength RUE  Strength RUE: WFL  Strength LUE  Strength LUE: WFL  Tone RLE  RLE Tone: Normotonic  Tone LLE  LLE Tone: Normotonic  Motor Control  Gross Motor?: WFL  Sensation  Overall Sensation Status: WFL  Bed mobility  Supine to Sit: Stand by assistance  Sit to Supine: Stand by assistance  Scooting: Stand by assistance  Comment: HOB elevated, use of bed rail, increased time to complete, increased difficulty breathing following mobility  Transfers  Comment: Deferred due to respiratory status, pt's SPO2 93% in supine, decreased to 84% initially with sitting upright, continued to fluctuate between 80% and 84%, RN present and increased high flow per instruction from RT. SPO2 remained below 85% desptie increasing high flow, rapid response called and pt returned to supine.   Ambulation  Ambulation?: No  Stairs/Curb  Stairs?: No     Balance  Posture: Good  Sitting - Static: Good  Sitting - Dynamic: Good;-  Comments: Pt sat EOB ~15 minutes with SBA, verbal cues for breathing technique throughout        Plan   Plan  Times per week: 3-5x/wk  Current Treatment Recommendations: Strengthening, ROM, Balance Training, Functional Mobility Training, Transfer Training, Stair training, Gait Training, Endurance Training, Home Exercise Program, Safety Education & Training, Patient/Caregiver Education & Training  Safety Devices  Type of devices: Nurse notified, Call light within reach, Left in bed  Restraints  Initially in place: No    AM-PAC Score  AM-PAC Inpatient Mobility Raw Score : 10 (09/22/21 1106)  AM-PAC Inpatient T-Scale Score : 32.29 (09/22/21 1106)  Mobility Inpatient CMS 0-100% Score: 76.75 (09/22/21 1106)  Mobility Inpatient CMS G-Code Modifier : CL (09/22/21 1106)          Goals  Short term goals  Time Frame for Short term goals: 14 visits  Short term goal 1: Perform bed mobility and functional transfers independently  Short term goal 2: Ambulate 300ft without AD and supervision  Short term goal 3: Participate in 30 minutes of therapy with SPO2 >90% to demo increased endurance  Short term goal 4: Ascend/descend 2 steps with HR and SBA       Therapy Time   Individual Concurrent Group Co-treatment   Time In [address]         Time Out 0947         Minutes 28         Timed Code Treatment Minutes: 8 Minutes       Oriana Esquivel PT

## 2021-09-22 NOTE — H&P
[facility]  Office: [address], DO, Daren Smoker, DO, [place] [facility], DO, Dianapio Ericksonn Blood, DO, Colonel Carlin MD, Lesli Salgado MD, Yanet Priest MD, Ashley Kenyon MD, Bethanie Rosas MD, Constanza Bradley MD, Chanell Alberto MD, Genny Galicia, DO, Eran Johnston, DO, Chrissy Velasquez MD,  Stepan Campo, DO, Judy Stafford MD, Santosh Quiles MD, Magalie Doherty MD, Elida Han MD, , Ashley Mckeon MD, Umer Severion MD, Enedina Lutz MD, Duane Souza, [facility], [facility] Murray, CNP, Kalani Castle, CNP, Perla Nunez, CNS, Suzan Huitron, CNP, Gume See, CNP, Delana Spatz, CNP, London Pinzon, CNP, Agnes Bonner, CNP, Wanda Felix PA-C, Lori Saldaña, [facility], Jackie Lights, CNP, Leo Dies, CNP, Beau Mata, CNP, Lg Almonte, CNP, Mark Aguilera, CNP, Jack Button, CNP, [place], CNP, Praful Payne, CNP         [address]    HISTORY AND PHYSICAL EXAMINATION            Date:   9/21/2021  Patient name:  Carley Sifuentes  Date of admission:  9/21/2021  8:43 PM  MRN:   [id number]  Account:  [de-identified]  YOB: 1980  PCP:    Maritza Aparicio  Room:   49PED/49PED  Code Status:    Prior    Chief Complaint:     Chief Complaint   Patient presents with    Other     COVID+    Shortness of Breath       History Obtained From:     patient, electronic medical record    History of Present Illness:     Carley Sifuentes is a 45 y.o. Non- / non  female who presents with Other (COVID+) and Shortness of Breath   and is admitted to the hospital for the management of Covid 19 Pneumonia     Patient presented to the emergency room for the concern of worsening COVID-19 Symptoms. Patient was originally seen Patient was originally diagnosed with the COVID 4 days prior. Patient states since diagnosis she has been having increased shortness of breath, cough  With water-like diarrhea.   Upon arrival to the emergency room patient was noted to be hypoxic with saturations in the 80's  she required 6 lpm nc supplemental oxygen. Past Medical History:     Past Medical History:   Diagnosis Date    Anemia     Chronic back pain     Diabetes mellitus ([address]     Hypertension         Past Surgical History:     Past Surgical History:   Procedure Laterality Date    ENDOMETRIAL ABLATION      LEEP          Medications Prior to Admission:     Prior to Admission medications    Medication Sig Start Date End Date Taking? Authorizing Provider   clobetasol (TEMOVATE) 0.05 % external solution Apply topically 3 times weekly to rash on scalp 6/12/20   Jamaal Aponte MD   Salicylic Acid 3 % SHAM Use 3-4 times weekly leave on for 5 minutes prior to washing off scalp 6/12/20   Jamaal Aponte MD   aspirin 81 MG tablet Take 81 mg by mouth daily    Historical Provider, MD   Probiotic Product (PROBIOTIC PO) Take by mouth    Historical Provider, MD   metFORMIN (GLUCOPHAGE) 500 MG tablet Take 500 mg by mouth 7/24/19   Historical Provider, MD   lisinopril (PRINIVIL;ZESTRIL) 2.5 MG tablet Take 2.5 mg by mouth 7/24/19   Historical Provider, MD   ferrous sulfate 325 (65 Fe) MG tablet Take 325 mg by mouth daily (with breakfast)    Historical Provider, MD   Multiple Vitamins-Minerals (WOMENS MULTIVITAMIN PO) Take by mouth    Historical Provider, MD   loperamide (IMODIUM) 2 MG capsule Take 2 mg by mouth 4 times daily as needed for Diarrhea    Historical Provider, MD        Allergies:     Cephalexin and Codeine    Social History:     Tobacco:    reports that she has never smoked. She has never used smokeless tobacco.  Alcohol:      reports current alcohol use. Drug Use:  reports no history of drug use. Family History:     Family History   Problem Relation Age of Onset    Hypotension Mother     Heart Disease Father     Heart Failure Father                 Review of Systems:     Positive and Negative as described in HPI.     Review of Systems   Constitutional: Positive for activity change, appetite change, chills and fever. Negative for diaphoresis. HENT: Negative for congestion and hearing loss. Respiratory: Positive for cough and shortness of breath. Negative for wheezing and stridor. Cardiovascular: Positive for chest pain. Negative for palpitations and leg swelling. Gastrointestinal: Positive for diarrhea and nausea. Negative for abdominal pain, blood in stool, constipation and vomiting. Genitourinary: Negative for dysuria and frequency. Musculoskeletal: Negative for myalgias. Skin: Negative for rash. Neurological: Negative for dizziness, seizures and headaches. Psychiatric/Behavioral: The patient is not nervous/anxious. Physical Exam:   /81   Pulse 112   Temp 100.2 °F (37.9 °C)   Resp 25   Ht 5' 3\" (1.6 m)   Wt 270 lb (122.5 kg)   SpO2 95%   BMI 47.83 kg/m²   Temp (24hrs), Av.2 °F (37.9 °C), Min:100.2 °F (37.9 °C), Max:100.2 °F (37.9 °C)    No results for input(s): POCGLU in the last 72 hours. No intake or output data in the 24 hours ending 21 2241    Physical Exam  Vitals and nursing note reviewed. Constitutional:       General: She is not in acute distress. Appearance: She is well-developed. She is ill-appearing. She is not diaphoretic. HENT:      Head: Normocephalic and atraumatic. Right Ear: Hearing normal.      Left Ear: Hearing normal.      Nose: Nose normal. No rhinorrhea. Eyes:      General: Lids are normal.      Extraocular Movements:      Right eye: Normal extraocular motion. Left eye: Normal extraocular motion. Conjunctiva/sclera: Conjunctivae normal.      Right eye: Right conjunctiva is not injected. Left eye: Left conjunctiva is not injected. Pupils: Pupils are equal, round, and reactive to light. Pupils are equal.      Right eye: Pupil is reactive. Left eye: Pupil is reactive. Neck:      Thyroid: No thyromegaly.       Vascular: No carotid bruit. Trachea: Trachea and phonation normal. No tracheal deviation. Cardiovascular:      Rate and Rhythm: Regular rhythm. Tachycardia present. Pulses: Normal pulses. Heart sounds: Normal heart sounds. No murmur heard. Pulmonary:      Effort: Pulmonary effort is normal. No respiratory distress. Breath sounds: No stridor. Examination of the right-middle field reveals rhonchi. Examination of the left-middle field reveals rhonchi. Examination of the right-lower field reveals decreased breath sounds. Examination of the left-lower field reveals decreased breath sounds. Decreased breath sounds and rhonchi present. Abdominal:      General: Bowel sounds are normal. There is no distension. Palpations: Abdomen is soft. There is no mass. Tenderness: There is no abdominal tenderness. There is no guarding. Musculoskeletal:         General: No tenderness. Cervical back: Neck supple. Skin:     General: Skin is warm and dry. Findings: No erythema, lesion or rash. Neurological:      General: No focal deficit present. Mental Status: She is alert and oriented to person, place, and time. She is not disoriented. GCS: GCS eye subscore is 4. GCS verbal subscore is 5. GCS motor subscore is 6. Cranial Nerves: No cranial nerve deficit or dysarthria. Sensory: No sensory deficit. Psychiatric:         Attention and Perception: Attention normal.         Mood and Affect: Mood normal.         Speech: Speech normal.         Behavior: Behavior normal. Behavior is cooperative.          Cognition and Memory: Cognition normal.         Investigations:      Laboratory Testing:  Recent Results (from the past 24 hour(s))   CBC WITH AUTO DIFFERENTIAL    Collection Time: 09/21/21  9:22 PM   Result Value Ref Range    WBC 4.5 3.5 - 11.3 k/uL    RBC 5.29 (H) 3.95 - 5.11 m/uL    Hemoglobin 15.6 (H) 11.9 - 15.1 g/dL    Hematocrit 45.7 36.3 - 47.1 %    MCV 86.4 82.6 - 102.9 fL MCH 29.5 25.2 - 33.5 pg    MCHC 34.1 28.4 - 34.8 g/dL    RDW 13.2 11.8 - 14.4 %    Platelets 095 912 - 462 k/uL    MPV 10.7 8.1 - 13.5 fL    NRBC Automated 0.0 0.0 per 100 WBC    Differential Type NOT REPORTED     WBC Morphology NOT REPORTED     RBC Morphology NOT REPORTED     Platelet Estimate NOT REPORTED     Immature Granulocytes 2 (H) 0 %    Seg Neutrophils 79 (H) 36 - 66 %    Lymphocytes 14 (L) 24 - 44 %    Monocytes 5 1 - 7 %    Eosinophils % 0 (L) 1 - 4 %    Basophils 0 0 - 2 %    Absolute Immature Granulocyte 0.09 0.00 - 0.30 k/uL    Segs Absolute 3.55 1.8 - 7.7 k/uL    Absolute Lymph # 0.63 (L) 1.0 - 4.8 k/uL    Absolute Mono # 0.23 0.1 - 0.8 k/uL    Absolute Eos # 0.00 0.0 - 0.4 k/uL    Basophils Absolute 0.00 0.0 - 0.2 k/uL    Morphology Normal    Basic Metabolic Panel w/ Reflex to MG    Collection Time: 09/21/21  9:22 PM   Result Value Ref Range    Glucose 368 (H) 70 - 99 mg/dL    BUN 14 6 - 20 mg/dL    CREATININE 0.92 (H) 0.50 - 0.90 mg/dL    Bun/Cre Ratio NOT REPORTED 9 - 20    Calcium 8.2 (L) 8.6 - 10.4 mg/dL    Sodium 135 135 - 144 mmol/L    Potassium 3.5 (L) 3.7 - 5.3 mmol/L    Chloride 93 (L) 98 - 107 mmol/L    CO2 22 20 - 31 mmol/L    Anion Gap 20 (H) 9 - 17 mmol/L    GFR Non-African American >60 >60 mL/min    GFR African American >60 >60 mL/min    GFR Comment          GFR Staging NOT REPORTED    C-REACTIVE PROTEIN    Collection Time: 09/21/21  9:22 PM   Result Value Ref Range    .7 (H) 0.0 - 5.0 mg/L   Procalcitonin    Collection Time: 09/21/21  9:22 PM   Result Value Ref Range    Procalcitonin 0.13 (H) <0.09 ng/mL   FERRITIN    Collection Time: 09/21/21  9:22 PM   Result Value Ref Range    Ferritin 756 (H) 13 - 150 ug/L   FIBRINOGEN    Collection Time: 09/21/21  9:22 PM   Result Value Ref Range    Fibrinogen 583 (H) 140 - 420 mg/dL   D-DIMER, QUANTITATIVE    Collection Time: 09/21/21  9:22 PM   Result Value Ref Range    D-Dimer, Quant 0.54 mg/L FEU   Magnesium    Collection Time: 09/21/21 9:22 PM   Result Value Ref Range    Magnesium 1.7 1.6 - 2.6 mg/dL   COVID-19, Rapid    Collection Time: 09/21/21  9:23 PM    Specimen: Nasopharyngeal Swab   Result Value Ref Range    Specimen Description . NASOPHARYNGEAL SWAB     SARS-CoV-2, Rapid DETECTED (A) Not Detected       Imaging/Diagnostics:  XR CHEST PORTABLE    Result Date: 9/21/2021  Multifocal airspace opacities worrisome multifocal atypical viral pneumonia. Low lung volumes       Assessment :      Hospital Problems         Last Modified POA    Pneumonia due to COVID-19 virus 9/21/2021 Yes    Hypertension 9/22/2021 Yes    Diabetes mellitus ([facility] [address] 9/22/2021 Yes          Plan:     Patient status inpatient in the Progressive Unit/Step down    Supportive treatment with albuterol/atrovent, supplemental oxygen  Initiate Remdesivir and consultation to infectious disease, decadron  Monitor liver function and kidney function  Monitor blood pressures, await review and reconciliation of home medications before resuming medications  Monitor glucose levels, will implement insulin sliding scale  Replacement of potassium  encouraged PO intake  GI proph  DVT proph     Consultations:   IP CONSULT TO HOSPITALIST  IP CONSULT TO PULMONOLOGY  IP CONSULT TO INFECTIOUS DISEASES     Patient is admitted as inpatient status because of co-morbidities listed above, severity of signs and symptoms as outlined, requirement for current medical therapies and most importantly because of direct risk to patient if care not provided in a hospital setting. Expected length of stay > 48 hours.     SONJA Gonzalez - [address] [facility]  9/21/2021  10:41 PM    Copy sent to Dr. Jenell Aschoff

## 2021-09-22 NOTE — CONSULTS
Pulmonary/Critical Care consultation    Patient's name:  Zohaib Gudino  Medical Record Number: [id number]  Patient's account/billing number: [de-identified]  Patient's YOB: 1980  Age: 45 y.o. Date of Admission: 9/21/2021  8:43 PM  Date of Consult: 9/22/2021      Primary Care Physician: Flako De Los Santos      Code Status: Full Code    Reason for consult: COVID-19 pneumonia with acute hypoxemic respiratory failure      HISTORY OF PRESENT ILLNESS:   History was obtained from chart review and the patient. Zohaib Gudino is a 45 y.o. white woman who was unvaccinated was admitted with shortness of breath and diagnosed with COVID-19 pneumonia. She apparently was diagnosed about 4 days ago. There was associated cough that is mostly dry and also having some diarrhea. Patient required increasing oxygen with initial oxygen saturation being in the 80s. No previous history of lung disease  Patient is a non-smoker          Past Medical History:        Diagnosis Date    Anemia     Chronic back pain     Diabetes mellitus ([address]     H/O LEEP     Hypertension     S/P endometrial ablation        Past Surgical History:        Procedure Laterality Date    ENDOMETRIAL ABLATION      LEEP         Allergies: Allergies   Allergen Reactions    Cephalexin      Gets yeast infection    Codeine          Home Meds:   Prior to Admission medications    Medication Sig Start Date End Date Taking?  Authorizing Provider   clobetasol (TEMOVATE) 0.05 % external solution Apply topically 3 times weekly to rash on scalp 6/12/20   Dago Singletary MD   Salicylic Acid 3 % SHAM Use 3-4 times weekly leave on for 5 minutes prior to washing off scalp 6/12/20   Dago Singletary MD   aspirin 81 MG tablet Take 81 mg by mouth daily    Historical Provider, MD   Probiotic Product (PROBIOTIC PO) Take by mouth    Historical Provider, MD   metFORMIN (GLUCOPHAGE) 500 MG tablet Take 500 mg by mouth 7/24/19   Historical Provider, MD   lisinopril (PRINIVIL;ZESTRIL) 2.5 MG tablet Take 2.5 mg by mouth 7/24/19   Historical Provider, MD   ferrous sulfate 325 (65 Fe) MG tablet Take 325 mg by mouth daily (with breakfast)    Historical Provider, MD   Multiple Vitamins-Minerals (WOMENS MULTIVITAMIN PO) Take by mouth    Historical Provider, MD   loperamide (IMODIUM) 2 MG capsule Take 2 mg by mouth 4 times daily as needed for Diarrhea    Historical Provider, MD       Family History:       Problem Relation Age of Onset    Hypotension Mother     Heart Disease Father     Heart Failure Father          Social History:   TOBACCO:   reports that she has never smoked. She has never used smokeless tobacco.  ETOH:   reports current alcohol use. DRUGS:  reports no history of drug use. OCCUPATION:   There  is not history of TB or TB exposure. There is not asbestos or silica dust exposure. The patient reports does not have coal, foundry, quarry or [employer] exposure. Travel history reveals nothing of significance. There is not  history of recreational or IV drug use. There is not hot tube exposure. The patient does not have pets, dogs, cats turtles or exotic birds.            REVIEW OF SYSTEMS:    Review of Systems -   General ROS: Completed and except as mentioned above were negative   Psychological ROS:  Completed and except as mentioned above were negative  Ophthalmic ROS:  Completed and except as mentioned above were negative  ENT ROS:  Completed and except as mentioned above were negative  Allergy and Immunology ROS:  Completed and except as mentioned above were negative  Hematological and Lymphatic ROS:  Completed and except as mentioned above were negative  Endocrine ROS: Completed and except as mentioned above were negative  Breast ROS:  Completed and except as mentioned above were negative  Respiratory ROS:  Completed and except as mentioned above were negative  Cardiovascular ROS:  Completed and except as mentioned above were negative  Gastrointestinal ROS: Completed and except as mentioned above were negative  Genito-Urinary ROS:  Completed and except as mentioned above were negative  Musculoskeletal ROS:  Completed and except as mentioned above were negative  Neurological ROS:  Completed and except as mentioned above were negative  Dermatological ROS:  Completed and except as mentioned above were negative          Physical Exam:    Vitals: /84   Pulse 83   Temp 97.9 °F (36.6 °C) (Axillary)   Resp 26   Ht 5' 3\" (1.6 m)   Wt 270 lb 4.8 oz (122.6 kg)   SpO2 94%   BMI 47.88 kg/m²     Last Body weight:   Wt Readings from Last 3 Encounters:   09/22/21 270 lb 4.8 oz (122.6 kg)   06/12/20 260 lb (117.9 kg)   02/03/20 259 lb (117.5 kg)       Body Mass Index : Body mass index is 47.88 kg/m². Intake and Output summary:     Intake/Output Summary (Last 24 hours) at 9/22/2021 1010  Last data filed at 9/22/2021 8404  Gross per 24 hour   Intake 580 ml   Output    Net 580 ml       Physical Examination:   PHYSICAL EXAMINATION:  Vitals:    09/22/21 0415 09/22/21 0430 09/22/21 0916 09/22/21 0936   BP:    125/84   Pulse: 82 83     Resp:       Temp:    97.9 °F (36.6 °C)   TempSrc:    Axillary   SpO2: 94% 92% 94%    Weight:       Height:         Constitutional: This is a well developed, well nourished, Greater than 40 - Morbid Obesity / Extreme Obesity / Grade III 45y.o. year old female who is alert, oriented, cooperative and in no apparent distress. Head:normocephalic and atraumatic. EENT:   ELVI. No conjunctival injections. Septum was midline, mucosa was without erythema, exudates or cobblestoning. No thrush was noted. Mallampati III (soft palate, base of uvula visible)  Neck: Supple without thyromegaly. No elevated JVP. Trachea was midline. Respiratory: Chest was symmetrical without dullness to percussion. Breath sounds bilaterally were clear to auscultation.  There were no wheezes, rhonchi or rales. There is no intercostal retraction or use of accessory muscles. No egophony noted. Cardiovascular: Regular without murmur, clicks, gallops or rubs. Abdomen: Slightly rounded and soft without organomegaly. No rebound tenderness, rigidity or guarding was appreciated. Lymphatic: No lymphadenopathy. Musculoskeletal: Normal curvature of the spine. No gross muscle weakness. Extremities:  No lower extremity edema, ulcerations, tenderness, varicosities or erythema. Muscle size, tone and strength are normal.  No involuntary movements are noted. Skin:  Warm and dry. Good color, turgor and pigmentation. No lesions or scars. No cyanosis or clubbing  Neurological/Psychiatric: The patient's general behavior, level of consciousness, thought content and emotional status is normal.            Laboratory findings:-    CBC:   Recent Labs     09/22/21 0624   WBC 3.6   HGB 14.3        BMP:    Recent Labs     09/21/21 2122 09/21/21 2122 09/22/21 0624      < > 135   K 3.5*   < > 4.5   CL 93*   < > 96*   CO2 22   < > 20   BUN 14   < > 16   CREATININE 0.92*  --  0.83   GLUCOSE 368*   < > 469*    < > = values in this interval not displayed. S. Calcium:  Recent Labs     09/22/21 0624   CALCIUM 8.0*     S. Ionized Calcium:No results for input(s): IONCA in the last 72 hours. S. Magnesium:  Recent Labs     09/21/21 2122   MG 1.7     S. Phosphorus:No results for input(s): PHOS in the last 72 hours. S. Glucose:  Recent Labs     09/22/21  0146 09/22/21  0950 09/22/21  1003   POCGLU 375* 456* 495*     Glycosylated hemoglobin A1C: No results for input(s): LABA1C in the last 72 hours. INR: No results for input(s): INR in the last 72 hours. Hepatic functions:   Recent Labs     09/21/21 2122   ALKPHOS 84   ALT 38*   AST 48*   PROT 7.0   BILITOT 0.54   BILIDIR 0.19   LABALBU 3.6     Pancreatic functions:No results for input(s): LACTA, AMYLASE in the last 72 hours.   S. Lactic Acid: No results for input(s): LACTA in the last 72 hours. Cardiac enzymes:No results for input(s): CKTOTAL, CKMB, CKMBINDEX, TROPONINI in the last 72 hours. BNP:No results for input(s): BNP in the last 72 hours. Lipid profile: No results for input(s): CHOL, TRIG, HDL, LDLCALC in the last 72 hours. Invalid input(s): LDL  Blood Gases: No results found for: PH, PCO2, PO2, HCO3, O2SAT  Thyroid functions: No results found for: TSH         Microbiology:    Cultures during this admission:     Blood cultures:      [] None drawn      [] Negative             []  Positive (Details:  )  Urine Culture:        [] None drawn      [] Negative             []  Positive (Details:  )  Sputum Culture:   [] None drawn       [] Negative             []  Positive (Details:  )     COVID-19 rapid test was positive    Radiological reports:    XR CHEST PORTABLE    Result Date: 9/21/2021  Multifocal airspace opacities worrisome multifocal atypical viral pneumonia. Low lung volumes           Assessment and Plan       IMPRESSION:   1. COVID-19        Active Problems:    Pneumonia due to COVID-19 virus    Hypertension    Diabetes mellitus ([facility] [address]  Resolved Problems:    * No resolved hospital problems. *  Morbid obesity   Acute hypoxic respiratory failure   Acute respiratory distress syndrome   COVID 19 infection/pneumonia    Plan:     Continue Airborne isolation   Continue high flow oxygen   Use BiPAP if needed   Obtain X-ray chest as needed    Monitor input/output, with a goal of even/negative fluid balance   Continue remdesivir   Continue Decadron but increase the dose to 20 mg a day   Consider starting the patient on tocilizumab or baricitinib.   This was discussed with the ID consultant   Monitor CRP, LDH, AST/ALT/ferritin/ D-dimer   Continue supportive care, tube feeds   GI/DVT prophylaxis   Glycemic control per primary service  On oral diet  Management as per guidance provided by hospital policy and prevailing evidence based medicine, during the COVID-19 pandemic emergency. This patient was evaluated in the context of the global SARS-CoV-2 (COVID-19) pandemic, which necessitated considerations that the patient either has COVID-19 infection or is at risk of infection with COVID-19. Institutional protocols and algorithms that pertain to the evaluation & management of patients with COVID-19 or those at risk for COVID-19 are in a state of rapid changes based on information released by regulatory bodies including the CDC and federal and state organizations. These policies and algorithms were followed during the patient's care. Please note that this chart was generated using voice recognition Dragon dictation software. Although every effort was made to ensure the accuracy of this automated transcription, some errors in transcription may have occurred. Thank you for having us involved in the care of your patient. Please call us if you have any questions or concerns.       Candance Mathieu, MD,             9/22/2021, 10:10 AM

## 2021-09-22 NOTE — ED PROVIDER NOTES
[facility] ED  Emergency Department Encounter  EmergencyMedicine Resident     Pt Taylor Carpenter  MRN: [id number]  Rah 1980  Date of evaluation: 9/21/21  PCP:  Maritza Aparicio    This patient was evaluated in the Emergency Department for symptoms described in the history of present illness. The patient was evaluated in the context of the global COVID-19 pandemic, which necessitated consideration that the patient might be at risk for infection with the SARS-CoV-2 virus that causes COVID-19. Institutional protocols and algorithms that pertain to the evaluation of patients at risk for COVID-19 are in a state of rapid change based on information released by regulatory bodies including the CDC and federal and state organizations. These policies and algorithms were followed during the patient's care in the ED. CHIEF COMPLAINT       Chief Complaint   Patient presents with    Other     COVID+    Shortness of Breath      HISTORY OF PRESENT ILLNESS  (Location/Symptom, Timing/Onset, Context/Setting, Quality, Duration, Modifying Factors, Severity.)      Carley Sifuentes is a 45 y.o. female who presents with shortness of breath for the past five days. She states she began having gradual onset shortness of breath, nonproductive cough and nonbloody diarrhea several times a day five days ago, and was tested for Covid four days ago and found to be positive. She is in moderate respiratory distress on arrival, requiring 6 L O2 on arrival into the ED. She has a history of asthma, DM and HTN. States she tried two nebulizer treatments at home without improvement in her shortness of breath. She is unvaccinated for Covid. She states she has some chest pain with prolonged bouts of coughing, but denies baseline chest pain when she is not coughing. Denies chest pain at rest, abdominal pain, nausea, vomiting, headache, syncope, seizures, fevers at home, hx of DVT or PE.     Allergies to cephalexin and codeine. PAST MEDICAL / SURGICAL / SOCIAL / FAMILY HISTORY      has a past medical history of Anemia, Chronic back pain, Diabetes mellitus ([address] and Hypertension. has no past surgical history on file. Social History     Socioeconomic History    Marital status: Single     Spouse name: Not on file    Number of children: Not on file    Years of education: Not on file    Highest education level: Not on file   Occupational History    Not on file   Tobacco Use    Smoking status: Never Smoker    Smokeless tobacco: Never Used   Substance and Sexual Activity    Alcohol use: Yes     Comment: occa.  Drug use: No    Sexual activity: Not on file   Other Topics Concern    Not on file   Social History Narrative    Not on file     Social Determinants of Health     Financial Resource Strain:     Difficulty of Paying Living Expenses:    Food Insecurity:     Worried About Running Out of Food in the Last Year:     [address] in the Last Year:    Transportation Needs:     Lack of Transportation (Medical):  Lack of Transportation (Non-Medical):    Physical Activity:     Days of Exercise per Week:     Minutes of Exercise per Session:    Stress:     Feeling of Stress :    Social Connections:     Frequency of Communication with Friends and Family:     Frequency of Social Gatherings with Friends and Family:     Attends Zoroastrianism Services:     Active Member of Clubs or Organizations:     Attends Club or Organization Meetings:     Marital Status:    Intimate Partner Violence:     Fear of Current or Ex-Partner:     Emotionally Abused:     Physically Abused:     Sexually Abused:        History reviewed. No pertinent family history. Allergies:  Cephalexin and Codeine    Home Medications:  Prior to Admission medications    Medication Sig Start Date End Date Taking?  Authorizing Provider   clobetasol (TEMOVATE) 0.05 % external solution Apply topically 3 times weekly to rash on scalp 6/12/20   Odilia Phillips MD   Salicylic Acid 3 % SHAM Use 3-4 times weekly leave on for 5 minutes prior to washing off scalp 6/12/20   Odilia Phillips MD   aspirin 81 MG tablet Take 81 mg by mouth daily    Historical Provider, MD   Probiotic Product (PROBIOTIC PO) Take by mouth    Historical Provider, MD   metFORMIN (GLUCOPHAGE) 500 MG tablet Take 500 mg by mouth 7/24/19   Historical Provider, MD   lisinopril (PRINIVIL;ZESTRIL) 2.5 MG tablet Take 2.5 mg by mouth 7/24/19   Historical Provider, MD   ferrous sulfate 325 (65 Fe) MG tablet Take 325 mg by mouth daily (with breakfast)    Historical Provider, MD   Multiple Vitamins-Minerals (WOMENS MULTIVITAMIN PO) Take by mouth    Historical Provider, MD   loperamide (IMODIUM) 2 MG capsule Take 2 mg by mouth 4 times daily as needed for Diarrhea    Historical Provider, MD       REVIEW OF SYSTEMS    (2-9 systems for level 4, 10 or more for level 5)      Review of Systems   Constitutional: Positive for chills and fatigue. Negative for fever. HENT: Negative for congestion, rhinorrhea, sore throat and trouble swallowing. Eyes: Negative for visual disturbance. Respiratory: Positive for cough (Nonproductive cough x 4 days) and shortness of breath (Gradual onset SOB x 4 days). Cardiovascular: Negative for chest pain (No chest pain at rest. Endorses substernal chest pain after a prolonged bout of coughing) and palpitations. Gastrointestinal: Positive for diarrhea (Nonbloody diarrhea x 4 days). Negative for abdominal pain, nausea and vomiting. Genitourinary: Negative for dysuria. Musculoskeletal: Positive for myalgias. Negative for arthralgias, back pain, neck pain and neck stiffness. Skin: Negative for pallor and rash. Neurological: Negative for dizziness, seizures, syncope, weakness, light-headedness and headaches. All other systems reviewed and are negative.       PHYSICAL EXAM   (up to 7 for level 4, 8 or more for level 5)      INITIAL VITALS:   BP 127/81   Pulse 112   Temp 100.2 °F (37.9 °C)   Resp 25   Ht 5' 3\" (1.6 m)   Wt 270 lb (122.5 kg)   SpO2 95%   BMI 47.83 kg/m²     Physical Exam  Vitals reviewed. Constitutional:       General: She is in acute distress (In moderate respiratory distress on arrival, SpO2 80 on RA. Placed on 6 L NC, SpO2 94). Appearance: She is ill-appearing. Interventions: She is not intubated. HENT:      Head: Normocephalic and atraumatic. Mouth/Throat:      Pharynx: Oropharynx is clear. No pharyngeal swelling or oropharyngeal exudate. Eyes:      Extraocular Movements: Extraocular movements intact. Pupils: Pupils are equal, round, and reactive to light. Cardiovascular:      Rate and Rhythm: Regular rhythm. Tachycardia present. Heart sounds: Normal heart sounds. No murmur heard. Pulmonary:      Effort: Tachypnea and respiratory distress (Moderate respiratory distress) present. She is not intubated. Breath sounds: Decreased breath sounds (Decreased breath sounds throughout) present. No wheezing, rhonchi or rales. Abdominal:      Palpations: Abdomen is soft. Tenderness: There is no abdominal tenderness. There is no guarding or rebound. Musculoskeletal:         General: Normal range of motion. Cervical back: Neck supple. Right lower leg: No tenderness. No edema. Left lower leg: No tenderness. No edema. Skin:     Capillary Refill: Capillary refill takes less than 2 seconds. Coloration: Skin is not pale. Findings: No rash. Neurological:      General: No focal deficit present. Mental Status: She is alert and oriented to person, place, and time. Cranial Nerves: No cranial nerve deficit. Motor: No weakness.        DIFFERENTIAL  DIAGNOSIS     PLAN (LABS / IMAGING / EKG):  Orders Placed This Encounter   Procedures    COVID-19, Rapid    XR CHEST PORTABLE    CBC WITH AUTO DIFFERENTIAL    Basic Metabolic Panel w/ Reflex to MG    C-REACTIVE PROTEIN    Procalcitonin    FERRITIN    FIBRINOGEN    D-DIMER, QUANTITATIVE    Magnesium    Inpatient consult to Hospitalist    Inpatient consult to Ronaldo to Dose: Remdesivir    EKG 12 Lead    PATIENT STATUS (FROM ED OR OR/PROCEDURAL) Inpatient     MEDICATIONS ORDERED:  Orders Placed This Encounter   Medications    acetaminophen (TYLENOL) tablet 650 mg    dexamethasone (DECADRON) injection 6 mg     DDX: Covid-19 vs covid pneumonia vs pneumothorax vs pleural effusion vs viral URI vs asthma exacerbation    DIAGNOSTIC RESULTS / EMERGENCY DEPARTMENT COURSE / MDM   LAB RESULTS:  Results for orders placed or performed during the hospital encounter of 09/21/21   COVID-19, Rapid    Specimen: Nasopharyngeal Swab   Result Value Ref Range    Specimen Description . NASOPHARYNGEAL SWAB     SARS-CoV-2, Rapid DETECTED (A) Not Detected   CBC WITH AUTO DIFFERENTIAL   Result Value Ref Range    WBC 4.5 3.5 - 11.3 k/uL    RBC 5.29 (H) 3.95 - 5.11 m/uL    Hemoglobin 15.6 (H) 11.9 - 15.1 g/dL    Hematocrit 45.7 36.3 - 47.1 %    MCV 86.4 82.6 - 102.9 fL    MCH 29.5 25.2 - 33.5 pg    MCHC 34.1 28.4 - 34.8 g/dL    RDW 13.2 11.8 - 14.4 %    Platelets 374 128 - 026 k/uL    MPV 10.7 8.1 - 13.5 fL    NRBC Automated 0.0 0.0 per 100 WBC    Differential Type NOT REPORTED     WBC Morphology NOT REPORTED     RBC Morphology NOT REPORTED     Platelet Estimate NOT REPORTED     Immature Granulocytes 2 (H) 0 %    Seg Neutrophils 79 (H) 36 - 66 %    Lymphocytes 14 (L) 24 - 44 %    Monocytes 5 1 - 7 %    Eosinophils % 0 (L) 1 - 4 %    Basophils 0 0 - 2 %    Absolute Immature Granulocyte 0.09 0.00 - 0.30 k/uL    Segs Absolute 3.55 1.8 - 7.7 k/uL    Absolute Lymph # 0.63 (L) 1.0 - 4.8 k/uL    Absolute Mono # 0.23 0.1 - 0.8 k/uL    Absolute Eos # 0.00 0.0 - 0.4 k/uL    Basophils Absolute 0.00 0.0 - 0.2 k/uL    Morphology Normal    Basic Metabolic Panel w/ Reflex to MG   Result Value Ref Range    Glucose 368 (H) 70 - 99 mg/dL BUN 14 6 - 20 mg/dL    CREATININE 0.92 (H) 0.50 - 0.90 mg/dL    Bun/Cre Ratio NOT REPORTED 9 - 20    Calcium 8.2 (L) 8.6 - 10.4 mg/dL    Sodium 135 135 - 144 mmol/L    Potassium 3.5 (L) 3.7 - 5.3 mmol/L    Chloride 93 (L) 98 - 107 mmol/L    CO2 22 20 - 31 mmol/L    Anion Gap 20 (H) 9 - 17 mmol/L    GFR Non-African American >60 >60 mL/min    GFR African American >60 >60 mL/min    GFR Comment          GFR Staging NOT REPORTED    C-REACTIVE PROTEIN   Result Value Ref Range    .7 (H) 0.0 - 5.0 mg/L   Procalcitonin   Result Value Ref Range    Procalcitonin 0.13 (H) <0.09 ng/mL   FERRITIN   Result Value Ref Range    Ferritin 756 (H) 13 - 150 ug/L   FIBRINOGEN   Result Value Ref Range    Fibrinogen 583 (H) 140 - 420 mg/dL   D-DIMER, QUANTITATIVE   Result Value Ref Range    D-Dimer, Quant 0.54 mg/L FEU   Magnesium   Result Value Ref Range    Magnesium 1.7 1.6 - 2.6 mg/dL     IMPRESSION: CBC unremarkable. Covid positive. Hyperglycemia. CRP, procalcitonin, fibrinogen elevated consistent with a Covid pneumonia. D-dimer mildly elevated but PE can be excluded by YEARS criteria. RADIOLOGY:  XR CHEST PORTABLE    Result Date: 9/21/2021  EXAMINATION: ONE XRAY VIEW OF THE CHEST 9/21/2021 9:27 pm COMPARISON: None. HISTORY: ORDERING SYSTEM PROVIDED HISTORY: SOB, Covid+ TECHNOLOGIST PROVIDED HISTORY: SOB, Covid+ Reason for Exam: cough, shortness of breath  upright port FINDINGS: Low lung volumes. Heart is prominent size. Patchy perihilar and bibasilar airspace opacities can be seen with interstitial edema versus multifocal pneumonia. No large effusion. No pneumothorax     Multifocal airspace opacities worrisome multifocal atypical viral pneumonia.  Low lung volumes     EMERGENCY DEPARTMENT COURSE:  ED Course as of Sep 21 2305   Tue Sep 21, 2021   2103 EKG Interpretation    Interpreted by emergency department physician    Rhythm: sinus tachycardia  Rate: tachycardia  Axis: right  Ectopy: none  Conduction: right bundle branch block (complete)  ST Segments: nonspecific changes  T Waves: inversion in  v2, v3, and v4  Q Waves: nonspecific    EKG  Impression: non-specific EKG        [WK]   2106 SpO2 80 on RA on arrival, placed on 6 L O2. SpO2 up to 94. Unvaccinated. Patient states she is positive for Covid as of a test four days ago. Will collect CXR, CBC, BMP, rapid Covid, D-dimer, ferritin, procalcitonin, CRP, fibrinogen    [JG]   2109 Lungs diminished throughout. No crackles, wheezes, rales, or rhonchi. Abd soft and nontender in all quadrants. Tachycardic and tachypneic. Endorsing gradual onset SOB five days ago and general fatigue, with nonproductive cough and nonbloody diarrhea. Has some CP with long bouts of coughing otherwise denying baseline CP. Hx of asthma, HTN, DM, obesity. Denying N/V, sore throat, rhinorrhea, syncope, headache, changes in vision, weakness. Denying hx of DVT or PE.    [JG]   2123 CXR in progress    [JG]   2141 WBC count normal awaiting prolactin likely more COVID than bacterial though    [WK]   2148 CXR showing multifocal airspace disease concerning for atypical viral PNA with low lung volumes    [JG]   2155 PE can be excluded by YEARS criteria    [JG]   2159 SpO2 94 on 6 L O2    [JG]   2250 Patient admitted to [facility], discussed with patient and she is agreeable to admission. Patient requesting medication for HA, given tylenol. [JG]   2258 Spoke with pulmonology at [facility] request. They would like patient started on dexamethasone 6 mg IV,  remdesivir per pharm dosing, with ID evaluation in the morning    [JG]      ED Course User Index  [JG] Marcos Sanabria MD  [WK] Dulce Amin DO     CONSULTS:  IP CONSULT TO HOSPITALIST  IP CONSULT TO PULMONOLOGY  PHARMACY TO DOSE MEDICATION  IP CONSULT TO INFECTIOUS DISEASES    FINAL IMPRESSION      1. COVID-19          DISPOSITION / [address]  Admitted 9/21/2021 11:01 PM    PATIENT REFERRED TO:  No follow-up provider specified.     DISCHARGE MEDICATIONS:  Current Discharge Medication List          Mariely Pinedo MD  Emergency Medicine Resident    (Please note that portions of thisnote were completed with a voice recognition program.  Efforts were made to edit the dictations but occasionally words are mis-transcribed.)       Mariely Pinedo MD  Resident  09/21/21 [facility] Mariam Spatz, MD  Resident  09/21/21 [id number]

## 2021-09-22 NOTE — ED PROVIDER NOTES
[facility]     Emergency Department     Faculty Note/ Attestation      Pt Name: Kemal Walker                                       MRN: [id number]  Armsisagfedie 1980  Date of evaluation: 9/21/2021    Patients PCP:    Hu Lim    Attestation  I performed a history and physical examination of the patient and discussed management with the resident. I reviewed the residents note and agree with the documented findings and plan of care. Any areas of disagreement are noted on the chart. I was personally present for the key portions of any procedures. I have documented in the chart those procedures where I was not present during the key portions. I have reviewed the emergency nurses triage note. I agree with the chief complaint, past medical history, past surgical history, allergies, medications, social and family history as documented unless otherwise noted below. For Physician Assistant/ Nurse Practitioner cases/documentation I have personally evaluated this patient and have completed at least one if not all key elements of the E/M (history, physical exam, and MDM). Additional findings are as noted. Initial Screens:        [place] Coma Scale  Eye Opening: Spontaneous  Best Verbal Response: Oriented  Best Motor Response: Obeys commands  [place] Coma Scale Score: 15    Vitals:    Vitals:    09/21/21 2049 09/21/21 2102 09/21/21 2114   BP: (!) 136/91 127/81    Pulse: 110 112    Resp: 30 25    Temp: 100.2 °F (37.9 °C)     SpO2: (!) 80% 95% 95%   Weight: 270 lb (122.5 kg)     Height: 5' 3\" (1.6 m)         CHIEF COMPLAINT       Chief Complaint   Patient presents with    Other     COVID+    Shortness of Breath       Patient 71-year-old female test +4 days ago for Covid.   Increasing shortness of breath difficulty breathing cough aches        EMERGENCY DEPARTMENT COURSE:     -------------------------  BP: 127/81, Temp: 100.2 °F (37.9 °C), Pulse: 112, Resp: 25  Physical Exam  Constitutional:       General: She is in acute distress. Appearance: She is well-developed. She is not diaphoretic. HENT:      Head: Normocephalic and atraumatic. Right Ear: External ear normal.      Left Ear: External ear normal.   Eyes:      General: No scleral icterus. Right eye: No discharge. Left eye: No discharge. Neck:      Trachea: No tracheal deviation. Pulmonary:      Effort: Tachypnea, accessory muscle usage and respiratory distress present. Breath sounds: No stridor. Comments: Pt requiring 6lpm per minute to maintain oxygenation  Musculoskeletal:         General: Normal range of motion. Cervical back: Normal range of motion. Skin:     General: Skin is warm and dry. Neurological:      Mental Status: She is alert and oriented to person, place, and time. Coordination: Coordination normal.   Psychiatric:         Behavior: Behavior normal.           Comments  Symptoms consistent with Covid patient in moderate respiratory distress requiring oxygen will need admission due to sats initially in the 80s. Will assess for superimposed pneumonia on top of covid and discuss treatment plan with admitting service about starting steroids  ED Course as of Sep 21 2129   Tue Sep 21, 2021   2103 EKG Interpretation    Interpreted by emergency department physician    Rhythm: sinus tachycardia  Rate: tachycardia  Axis: right  Ectopy: none  Conduction: right bundle branch block (complete)  ST Segments: nonspecific changes  T Waves: inversion in  v2, v3, and v4  Q Waves: nonspecific    EKG  Impression: non-specific EKG        [WK]   2106 SpO2 80 on RA on arrival, placed on 6 L O2. SpO2 up to 94. Unvaccinated. Patient states she is positive for Covid as of a test four days ago. Will collect CXR, CBC, BMP, rapid Covid, D-dimer, ferritin, procalcitonin, CRP, fibrinogen    [JG]   2109 Lungs diminished throughout. No crackles, wheezes, rales, or rhonchi.  Abd soft and nontender in all quadrants. Tachycardic and tachypneic. Endorsing gradual onset SOB five days ago and general fatigue, with nonproductive cough and nonbloody diarrhea. Has some CP with long bouts of coughing otherwise denying baseline CP. Hx of asthma, HTN, DM, obesity. Denying N/V, sore throat, rhinorrhea, syncope, headache, changes in vision, weakness. Denying hx of DVT or PE.    [JG]   2123 CXR in progress    [JG]      ED Course User Index  [JG] Zena Paez MD  [WK] DO Edward Simon DO,, DO, RDMS.   Attending Emergency Physician          Edward Dave DO  09/21/21 2132

## 2021-09-22 NOTE — FLOWSHEET NOTE
SPIRITUAL CARE DEPARTMENT - [address]  PROGRESS NOTE    Shift date: 9/22/21  Shift day: Wednesday   Shift # 2    Room # 2021/2021-01   Name: Jaswant Monaco            Age: 45 y.o. Gender: female          Hoahaoism: Unknown   Place of Tenriism: Unknown    Referral: Rapid Response    Admit Date & Time: 9/21/2021  8:43 PM    PATIENT/EVENT DESCRIPTION:  Jaswant Monaco is a 45 y.o. female in room 2021 of [address]. SPIRITUAL ASSESSMENT/INTERVENTION:   responded to the referral written by other  to follow up with Pt.  walked over to [address] and spoke to the Pt via phone.  asked how Pt was feeling. Pt says that she is feeling fine now. Rapid called because Pt had low C02 levels. Rapid was called off after medical assessed the situation and fixed the issue. I spoke with the nurse who is monitoring the Pt. The nurse says the Pt is doing well. SPIRITUAL CARE FOLLOW-UP PLAN:  Chaplains will remain available to offer spiritual and emotional support as needed. Electronically signed by Jackie Antonio, on 9/22/2021 at 10:37 AM.  [facility]  [phone]       09/22/21 1034   Encounter Summary   Services provided to: Patient   Referral/Consult From: Multi-disciplinary team   Support System Unknown   Continue Visiting   (9/22/21)   Complexity of Encounter Moderate   Length of Encounter 30 minutes   Routine   Type Initial   Assessment Anxious; Coping;Helplessness  (Not getting enough Co2)   Intervention Active listening;Sustaining presence/ Ministry of presence  (Morale check)   Outcome Expressed gratitude;Engaged in conversation;Venting emotion;Receptive

## 2021-09-22 NOTE — CARE COORDINATION
Case Management Initial Discharge Plan  Kell Denton,             Met with:patient over the phone to discuss discharge plans. Information verified: address, contacts, phone number, , insurance Yes  Insurance Provider: [facility]    Emergency Contact/Next of Kin name & number: Alyssa Hendrickson (mother) [phone]  Who are involved in patient's support system? family    PCP: Olimpia Teixeira  Date of last visit: has appt       Discharge Planning    Living Arrangements:        Home has 2 stories  few stairs to climb to get into front door, stairs to climb to reach second floor  Location of bedroom/bathroom in home main    Patient able to perform ADL's:Independent    Current Services (outpatient & in home) none  DME equipment: none  DME provider: none    Is patient receiving oral anticoagulation therapy? No    If indicated:   Physician managing anticoagulation treatment:   Where does patient obtain lab work for ATC treatment? Potential Assistance Needed:       Patient agreeable to home care: No  [place] of choice provided:  no    Prior SNF/Rehab Placement and Facility: no  Agreeable to SNF/Rehab: No  [place] of choice provided: no     Evaluation: no    Expected Discharge date:       Patient expects to be discharged to: If home: is the family and/or caregiver wiling & able to provide support at home? Who will be providing this support? Follow Up Appointment: Best Day/ Time:      Transportation provider: family  Transportation arrangements needed for discharge: No    Readmission Risk              Risk of Unplanned Readmission:  13             Does patient have a readmission risk score greater than 14?: No  If yes, follow-up appointment must be made within 7 days of discharge.      Goals of Care:       Educated patient on transitional options, provided freedom of choice and are agreeable with plan      Discharge Plan: Covid+, HF 70% 40L, home with children          Electronically signed by Dacia Oscar RN on 9/22/21 at 11:35 AM EDT

## 2021-09-22 NOTE — PLAN OF CARE
Problem: Airway Clearance - Ineffective  Goal: Achieve or maintain patent airway  9/22/2021 0750 by Emiliano Beyer RN  Outcome: Ongoing     Problem: Gas Exchange - Impaired  Goal: Absence of hypoxia  Outcome: Ongoing     Problem: Breathing Pattern - Ineffective  Goal: Ability to achieve and maintain a regular respiratory rate  9/22/2021 0918 by Dank Kirkpatrick RCP  Outcome: Ongoing  9/22/2021 0750 by Emiliano Beyer RN  Outcome: Ongoing

## 2021-09-22 NOTE — CONSULTS
[facility]  Initial Consult Note  Date: 9/22/2021    Hospital day :1     Impression:   1. COVID-19 virus infection tested + 9/17/2021  2. Acute hypoxic respiratory failure currently requiring high flow O2 by nasal cannula with a nonrebreather mask  3. Elevated inflammatory marker with CRP greater than 200  4. Morbid obesity  5. Diabetes mellitus type 2    Recommendations   · The patient has been started on Decadron initially 6 mg and switch to high-dose Decadron today. · The patient has been started on remdesivir but this will be discontinued as this is of no clinical benefit at this time  · A dose of Actemra has been ordered 9/21/2021  · Continue supportive therapy and will follow her clinical progress    Chief complaint/reason for consultation:   Acute hypoxic respiratory failure, COVID-19 virus pneumonia    History of Present Illness:   Leonel Harden is a 45y.o.-year-old female who was initially admitted on 9/21/2021. Rakesh Jay is an unvaccinated 59-year-old woman with a history of diabetes mellitus type 2, hypertension, chronic back pain, anemia and morbid obesity among other medical problems. She reports that on Tuesday, September 13, 2021 she started having symptoms with a cough, headache, generalized malaise. She does not report any subjective fevers, chills or sweats but subsequently started to develop some shortness of breath as well as some diarrhea. The diarrhea though she could not quite say if it was related to this illness as she does have irritable bowel syndrome and intermittently does have diarrhea. The patient did subsequently have COVID-19 testing done on September 17, 2021 and was found to be Covid positive. By Saturday she reports that she was having significant episodes of shortness of breath which prompted her to come into the emergency department for evaluation.     In the ED he was noted to be hypoxic with saturations in the 80s and was started on 6 L of oxygen by nasal cannula and due to worsening hypoxia the patient was placed on high flow O2 by nasal cannula at 40 L and 100% and was also placed on a nonrebreather. The BiPAP was also ordered and the patient was seen by the pulmonology service earlier today. The patient had initially been started on remdesivir and Decadron and the Decadron dose was increased to high-dose Decadron today. I was asked to evaluate and help with medical therapy. I have personally reviewed the past medical history, past surgical history, medications, social history, and family history, and I have updated the database accordingly.   Past Medical History:     Past Medical History:   Diagnosis Date    Anemia     Chronic back pain     Diabetes mellitus ([facility] [address]     H/O LEEP     Hypertension     S/P endometrial ablation      Past Surgical  History:     Past Surgical History:   Procedure Laterality Date    ENDOMETRIAL ABLATION      LEEP       Medications:      albuterol sulfate HFA  2 puff Inhalation 4x daily    And    ipratropium  2 puff Inhalation 4x daily    famotidine  20 mg Oral Daily    insulin glargine  20 Units SubCUTAneous BID    dexamethasone  20 mg IntraVENous Daily    Followed by   Debbie Dominguez ON 9/27/2021] dexamethasone  10 mg IntraVENous Daily    tocilizumab (ACTEMRA) IVPB  810 mg IntraVENous Once    aspirin  81 mg Oral Daily    ferrous sulfate  325 mg Oral Daily with breakfast    sodium chloride flush  5-40 mL IntraVENous 2 times per day    enoxaparin  30 mg SubCUTAneous BID    Vitamin D  2,000 Units Oral Daily    [START ON 9/23/2021] remdesivir IVPB  100 mg IntraVENous Q24H     Social History:     Social History     Socioeconomic History    Marital status: Single     Spouse name: Not on file    Number of children: Not on file    Years of education: Not on file    Highest education level: Not on file   Occupational History    Not on file   Tobacco Use    Smoking status: Never Smoker    Smokeless tobacco: Never Used   Substance and Sexual Activity    Alcohol use: Yes     Comment: renetta.  Drug use: No    Sexual activity: Not on file   Other Topics Concern    Not on file   Social History Narrative    Not on file     Social Determinants of Health     Financial Resource Strain:     Difficulty of Paying Living Expenses:    Food Insecurity:     Worried About Running Out of Food in the Last Year:     [address] in the Last Year:    Transportation Needs:     Lack of Transportation (Medical):  Lack of Transportation (Non-Medical):    Physical Activity:     Days of Exercise per Week:     Minutes of Exercise per Session:    Stress:     Feeling of Stress :    Social Connections:     Frequency of Communication with Friends and Family:     Frequency of Social Gatherings with Friends and Family:     Attends [employer] Services:     Active Member of Clubs or Organizations:     Attends Club or Organization Meetings:     Marital Status:    Intimate Partner Violence:     Fear of Current or Ex-Partner:     Emotionally Abused:     Physically Abused:     Sexually Abused:      Family History:     Family History   Problem Relation Age of Onset    Hypotension Mother     Heart Disease Father     Heart Failure Father       Allergies:   Cephalexin and Codeine     Review of Systems:   General: No fevers or chills. Eyes: No double vision or blurry vision. ENT: No sore throat or runny nose. Cardiovascular: No chest pain or palpitations. Lung: The patient has had a cough and increasing shortness of breath  Abdomen: No nausea vomiting but has had diarrhea  Genitourinary: No increased urinary frequency, or dysuria. Musculoskeletal: Did have some arthralgias and myalgias  Hematologic: No bleeding or bruising. Neurologic: No headache, weakness, numbness, or tingling.     Physical Examination :   /87   Pulse 93   Temp 97.5 °F (36.4 °C) (Axillary)   Resp (!) 39   Ht 5' 3\" (1.6 m)   Wt 270 lb 4.8 oz (122.6 kg)   SpO2 99%   BMI 47.88 kg/m²     Temperature Range: Temp: 97.5 °F (36.4 °C) Temp  Av.6 °F (37 °C)  Min: 97.5 °F (36.4 °C)  Max: 100.3 °F (37.9 °C)  General Appearance: Awake and alert in moderate respiratory distress  Head: Normocephalic, without obvious abnormality, atraumatic  Eyes: Pupils equal, round, reactive, to light and accommodation; extraocular movements intact; sclera anicteric; conjunctivae pink  ENT: Oropharynx clear, without erythema, exudate, or thrush. Neck: Supple, without lymphadenopathy. Pulmonary/Chest: Crackles appreciated in the right side of the lung greater than the left  Cardiovascular: Regular rate and rhythm without murmurs, rubs, or gallops. Abdomen: Obese abdomen with positive bowel sounds in all 4 quadrants and Soft, nontender, nondistended. Extremities: No cyanosis, clubbing, edema, or effusions. Neurologic: No gross sensory or motor deficits. Skin: Warm and dry with no rash.     Medical Decision Making:   I have independently reviewed/ordered the following labs:  CBC with Differential:   Recent Labs     21  0624   WBC 4.5 3.6   HGB 15.6* 14.3   HCT 45.7 42.0    169   LYMPHOPCT 14*  --    MONOPCT 5  --      BMP:   Recent Labs     21  0624    135   K 3.5* 4.5   CL 93* 96*   CO2 22 20   BUN 14 16   CREATININE 0.92* 0.83   MG 1.7  --      Hepatic Function Panel:   Recent Labs     21   PROT 7.0   LABALBU 3.6   BILIDIR 0.19   IBILI 0.35   BILITOT 0.54   ALKPHOS 84   ALT 38*   AST 48*       Lab Results   Component Value Date    PROCAL 0.13 2021       Lab Results   Component Value Date    .8 2021    .7 2021     Lab Results   Component Value Date    FERRITIN 706 2021    FERRITIN 756 2021     Lab Results   Component Value Date    FIBRINOGEN 583 2021     Lab Results   Component Value Date    DDIMER 0.54 2021     No results found for: LDH    No results found for: Camillo Cogan    Lab Results   Component Value Date    COVID19 DETECTED 09/21/2021     No results found for requested labs within last 30 days. Imaging Studies:   ONE XRAY VIEW OF THE CHEST 9/21/2021 9:27 pm     FINDINGS:   Multifocal airspace opacities worrisome multifocal atypical viral pneumonia. Low lung volumes       Cultures:     Sputum gram stain [[id number]]    Order Status: No result Specimen: Sputum Expectorated    Respiratory Culture [[id number]]    Order Status: No result Specimen: Sputum Expectorated    COVID-19, Rapid [[id number]] (Abnormal) Collected: 09/21/21 2123   Order Status: Completed Specimen: Nasopharyngeal Swab Updated: 09/21/21 2154    Specimen Description . NASOPHARYNGEAL SWAB    SARS-CoV-2, Rapid DETECTEDAbnormal     Comment:        Rapid NAAT: The specimen is POSITIVE for SARS-Cov-2, the novel coronavirus associated with   COVID-19.         This test has been authorized by the FDA under an Emergency Use Authorization (EUA) for use   by authorized laboratories.         The ID NOW COVID-19 assay is designed to detect the virus that causes COVID-19 in patients   with signs and symptoms of infection who are suspected of COVID-19. An individual without symptoms of COVID-19 and who is not shedding SARS-CoV-2 virus would   expect to have a negative (not detected) result in this assay. Fact sheet for Healthcare Providers: Ping   Fact sheet for Patients: Ping           Methodology: Isothermal Nucleic Acid Amplification         Results reported to the appropriate Health Department               Thank you for allowing us to participate in the care of this patient. Please call with questions.     Electronically signed by Eron Irving MD on 9/22/2021 at 5:43 PM      [facility]  Eron Irving MD  Perfect Serve messaging  OFFICE: [phone]      This note is created with the assistance of a speech recognition program.  While intending to generate a document that actually reflects the content of the visit, the document can still have some errors including those of syntax and sound a like substitutions which may escape proof reading. In such instances, actual meaning can be extrapolated by contextual diversion.

## 2021-09-22 NOTE — PROGRESS NOTES
Remdesivir Initiation & Daily Monitoring    Physician requesting remdesivir : Dr. Vicky Quintanilla    Demonstrated benefit is to shorten the duration of illness, mortality benefit has not been shown. Seems to be most beneficial early in the disease course. Criteria for use (confirm all are met):   1) Suspected/Confirmed COVID-19 positive and requiring hospitalization  2) Patient requires supplemental oxygen (this is the population for whom remdesivir demonstrated a shortened duration of illness; benefit less clear for those on high flow oxygen or mechanical ventilation, but may be reasonable to consider if change in respiratory status was recent)  3) Not recommended to be used in patients with baseline ALT 5x ULN or more    There are no PK data available for severe renal impairment (eGFR < 30 mL/min) or on RRT. Consider risk/benefit for individual patient. Baseline CrCl: Estimated Creatinine Clearance: 104 mL/min (A) (based on SCr of 0.92 mg/dL (H)). Ensure LFTs are ordered at baseline & consider if monitoring during therapy is appropriate (this information is included in CMP or can be ordered separately). Package insert states: Perform hepatic laboratory testing in all patients before starting VEKLURY and while receiving VEKLURY as clinically appropriate. Baseline ALT: 38    Duration of therapy should be limited to 5 days.   Anticipated stop date: 9/26/21    Kameron Patricio  PharmD  [phone]

## 2021-09-23 PROBLEM — E11.10 DIABETIC KETOACIDOSIS WITHOUT COMA ASSOCIATED WITH TYPE 2 DIABETES MELLITUS (HCC): Status: ACTIVE | Noted: 2021-01-01

## 2021-09-23 NOTE — PROGRESS NOTES
Pulmonary Progress Note    CC:  COVID -19 PNEUMONIA   Subjective: On high flow oxygen +NRB   Intermittent tachypnea in low 30s . does not show signs of respiratory muscle fatigue       Review of Systems -  General ROS: fatigue  ENT ROS: negative for - headaches, oral lesions or sore throat  Cardiovascular ROS: no chest pain , orthopnea or pnd   Gastrointestinal ROS: no abdominal pain, change in bowel habits, or black or bloody stools  Neuro - no blurry vision , no loc . No focal weakness       Immunization   Immunization History   Administered Date(s) Administered    DTaP 06/27/2012    MMR 08/28/2012    PPD Test 08/28/2012          PAST MEDICAL HISTORY:       Diagnosis Date    Anemia     Chronic back pain     Diabetes mellitus ([address]     H/O LEEP     Hypertension     S/P endometrial ablation          Family History:       Problem Relation Age of Onset    Hypotension Mother     Heart Disease Father     Heart Failure Father        SURGICAL HISTORY:   Past Surgical History:   Procedure Laterality Date    ENDOMETRIAL ABLATION      LEEP                TOBACCO:   reports that she has never smoked. She has never used smokeless tobacco.  ETOH:   reports current alcohol use. ALLERGIES:    Allergies   Allergen Reactions    Cephalexin      Gets yeast infection    Codeine        Home Meds:   Prior to Admission medications    Medication Sig Start Date End Date Taking?  Authorizing Provider   clobetasol (TEMOVATE) 0.05 % external solution Apply topically 3 times weekly to rash on scalp 6/12/20   Veronica Pittman MD   Salicylic Acid 3 % SHAM Use 3-4 times weekly leave on for 5 minutes prior to washing off scalp 6/12/20   Veronica Pittman MD   aspirin 81 MG tablet Take 81 mg by mouth daily    Historical Provider, MD   Probiotic Product (PROBIOTIC PO) Take by mouth    Historical Provider, MD   metFORMIN (GLUCOPHAGE) 500 MG tablet Take 500 mg by mouth 7/24/19   Historical Provider, MD   lisinopril (PRINIVIL;ZESTRIL) 2.5 MG tablet Take 2.5 mg by mouth 7/24/19   Historical Provider, MD   ferrous sulfate 325 (65 Fe) MG tablet Take 325 mg by mouth daily (with breakfast)    Historical Provider, MD   Multiple Vitamins-Minerals (WOMENS MULTIVITAMIN PO) Take by mouth    Historical Provider, MD   loperamide (IMODIUM) 2 MG capsule Take 2 mg by mouth 4 times daily as needed for Diarrhea    Historical Provider, MD         Intake/Output Summary (Last 24 hours) at 9/23/2021 1123  Last data filed at 9/22/2021 2023  Gross per 24 hour   Intake 816 ml   Output 420 ml   Net 396 ml         Diet   ADULT DIET; Regular; 5 carb choices (75 gm/meal)    Vitals:   /64   Pulse 81   Temp 98 °F (36.7 °C) (Axillary)   Resp 25   Ht 5' 3\" (1.6 m)   Wt 270 lb 4.8 oz (122.6 kg)   SpO2 (!) 88%   BMI 47.88 kg/m²  on         I/O (24 Hours)    Patient Vitals for the past 8 hrs:   BP Temp Temp src Pulse Resp SpO2   09/23/21 0848 115/64 98 °F (36.7 °C) Axillary 81 25 (!) 88 %   09/23/21 0827     24 90 %   09/23/21 0336     29 91 %       Intake/Output Summary (Last 24 hours) at 9/23/2021 1123  Last data filed at 9/22/2021 2023  Gross per 24 hour   Intake 816 ml   Output 420 ml   Net 396 ml     I/O last 3 completed shifts: In: 816 [P.O.:640; I.V.:176]  Out: 420 [Urine:420]     Patient Vitals for the past 96 hrs (Last 3 readings):   Weight   09/22/21 0400 270 lb 4.8 oz (122.6 kg)   09/21/21 2049 270 lb (122.5 kg)          PHYSICAL EXAMINATION:  I have discussed the care of Gin Raymundo  including pertinent history and exam findings,  with the nursing staff I have seen  the patient and the key elements of all parts of the encounter have been performed by me. For careful stewardship of limited PPE during COVID-19 pandemic my physical exam was deferred.   .        Medications:    Scheduled Meds:   insulin lispro  0-18 Units SubCUTAneous TID WC    insulin lispro  0-9 Units SubCUTAneous Nightly    albuterol sulfate HFA  2 puff Inhalation 4x daily    And    ipratropium  2 puff Inhalation 4x daily    famotidine  20 mg Oral Daily    insulin glargine  20 Units SubCUTAneous BID    dexamethasone  20 mg IntraVENous Daily    Followed by   Danandrew Redhead ON 9/27/2021] dexamethasone  10 mg IntraVENous Daily    aspirin  81 mg Oral Daily    ferrous sulfate  325 mg Oral Daily with breakfast    sodium chloride flush  5-40 mL IntraVENous 2 times per day    enoxaparin  30 mg SubCUTAneous BID    Vitamin D  2,000 Units Oral Daily       Continuous Infusions:   dextrose      dextrose      sodium chloride 25 mL (09/22/21 1822)       PRN Meds:  glucose, dextrose, glucagon (rDNA), dextrose, potassium chloride **OR** potassium alternative oral replacement **OR** potassium chloride, albuterol sulfate HFA, glucose, dextrose, glucagon (rDNA), dextrose, magnesium sulfate, sodium phosphate IVPB **OR** sodium phosphate IVPB **OR** sodium phosphate IVPB, sodium chloride flush, sodium chloride, ondansetron **OR** ondansetron, magnesium hydroxide, acetaminophen **OR** acetaminophen, dextromethorphan-guaiFENesin    Labs:  CBC:   Recent Labs     09/21/21 2122 09/22/21  0624   WBC 4.5 3.6   HGB 15.6* 14.3   HCT 45.7 42.0   MCV 86.4 85.5    169     BMP:   Recent Labs     09/22/21  1959 09/23/21  0031 09/23/21  0533    135 134*   K 4.1 3.9 4.2   CL 99 100 104   CO2 24 20 19*   PHOS 2.1* 1.2* 1.7*   BUN 19 22* 21*   CREATININE 0.76 0.69 0.64     LIVER PROFILE:   Recent Labs     09/21/21 2122   AST 48*   ALT 38*   BILIDIR 0.19   BILITOT 0.54   ALKPHOS 84     PT/INR: No results for input(s): PROTIME, INR in the last 72 hours. APTT: No results for input(s): APTT in the last 72 hours.   UA:  Recent Labs     09/22/21  1843   COLORU YELLOW   PHUR 6.0   WBCUA 20 TO 50   RBCUA 5 TO 10   MUCUS NOT REPORTED   TRICHOMONAS NOT REPORTED   YEAST NOT REPORTED   BACTERIA FEW*   SPECGRAV 1.039*   LEUKOCYTESUR NEGATIVE   UROBILINOGEN Normal   BILIRUBINUR NEGATIVE   GLUCOSEU 3+*   AMORPHOUS NOT REPORTED     No results for input(s): PHART, CCB5SIO, PO2ART in the last 72 hours. Films:  XR CHEST PORTABLE    Result Date: 9/21/2021  Multifocal airspace opacities worrisome multifocal atypical viral pneumonia. Low lung volumes          LOS: 2          Vent Information  FiO2 : 93 %  SpO2: (!) 88 %  SpO2/FiO2 ratio: 96.77  Humidification Source: Heated wire  Humidification Temp: 31     PaO2/FiO2 RATIO:  Recent Labs     09/22/21  1003   POCPO2 85.4      FiO2 : 93 %       LABS:  ABGs:   Recent Labs     09/22/21  1003   POCPH 7.414   POCPCO2 35.4   POCPO2 85.4   POCHCO3 22.7   UCVL6YNT 97            ASSESSMENT:  Active Problems:    Pneumonia due to COVID-19 virus    Hypertension    Diabetes mellitus (HCC)    Elevated C-reactive protein (CRP)    Sepsis (HCC)    Diabetic ketoacidosis without coma associated with type 2 diabetes mellitus ([facility] [address]  Resolved Problems:    * No resolved hospital problems. *        Acute hypoxic respiratory failure secondary to COVID 19   Bilateral multifocal pneumonia due to COVID 19 infection   Covid -19 pandemic emergency     PLAN:    PRONE :       [] No    [x] Yes      DEXAMETHASONE : [] No    [x] Yes      keep sats >92 % - she is on high flow and nrb - will start Bipap with short breaks on high flow for oral intake . Airborne isolation and droplet precautions to be continued  Continue supportive care   Cont treatment with medications for COVID-19  REMDESIVIR dc since no clinical benefit   Actemra   DEXAMETHASONE    Will obtain xray chest and ABG as needed  Patient is on appropriate DVT and stress ulcer prophylaxis as allowed by the medical condition  Treatment plan Discussed with nursing staff in detail , all questions answered .      Electronically signed by Jose Salmeron MD on 9/23/2021 at 11:23 AM       This patient was evaluated in the context of the global SARS-CoV-2 (COVID-19) pandemic, which necessitated considerations that the patient either has COVID-19 infection or is at risk of infection with COVID-19. Institutional protocols and algorithms that pertain to the evaluation & management of patients with COVID-19 or those at risk for COVID-19 are in a state of rapid changes based on information released by regulatory bodies including the CDC and federal and state organizations. These policies and algorithms were followed during the patient's care. Please note that this chart was generated using voice recognition Dragon dictation software. Although every effort was made to ensure the accuracy of this automated transcription, some errors in transcription may have occurred.

## 2021-09-23 NOTE — PROGRESS NOTES
[facility]  Office: [address], DO, Beatriz Longo, DO, Timoteo Signs, DO, Trudi Zamarripa Blood, DO, Marco Chatterjee MD, Mignon Meckel, MD, Nanci Yan MD, Fina Yang MD, Juan Soto MD, Maia Pop MD, Kathy Rockwell MD, Alissa Casas, DO, Ken Dumont, DO, Sam Berkowitz MD,  Dylan Storm, DO, Jarred Jenkins MD, Doug Ko MD, Rohini Barr MD, Kate Wilkinson MD, , Brandt Bettencourt MD, Julian Bowden MD, Radha Kunz MD, Jocy Barnett, Carlos Alonso, CNP, Em Blackmon, CNP, Louie Erazo, CNS, Grace Diggs, CNP, Carmela Baumann, CNP, Reymundo England, CNP, Quincy Garza, CNP, Isaac Lee, CNP, Larissa Calix PA-C, Love Toussaint, [facility], Tran Perez, CNP, Kelli Mcfarland, CNP, Sreekanth Shah, CNP, Caryle Poet, CNP, Radha Nova, CNP, Sue Camarena, CNP, Juan David Mcfarland, CNP, Russell Espinal, [address] [place] [address]    Progress Note    9/23/2021    7:53 AM    Name:   Jaswant Monaco  MRN:     [id number]     Acct:      [de-identified]   Room:   2021/2021-01  IP Day:  2  Admit Date:  9/21/2021  8:43 PM    PCP:   Mayelin Ruggiero  Code Status:  Full Code    Subjective:     C/C:   Chief Complaint   Patient presents with   Jemal Spina Other     COVID+    Shortness of Breath     Interval History Status: worsened. Patient seen and evaluated in room continues on full high flow O2 support with nonrebreather, tolerating well. Insulin drip initiated yesterday evening has been discontinued blood sugars improving but guarded status given high intensity steroid administration. Awaiting BiPAP    Brief History:     Jaswant Monaco is a 45 y.o. Non- / non  female who presents with Other (COVID+) and Shortness of Breath   and is admitted to the hospital for the management of Covid 19 Pneumonia      Patient presented to the emergency room for the concern of worsening COVID-19 Symptoms.   Patient was originally seen Patient was originally diagnosed with the COVID 4 days prior. Patient states since diagnosis she has been having increased shortness of breath, cough  With water-like diarrhea. Upon arrival to the emergency room patient was noted to be hypoxic with saturations in the 80's  she required 6 lpm nc supplemental oxygen.         Review of Systems:     Constitutional:  negative for chills, +fevers, +sweats  Respiratory:  +cough, +dyspnea on exertion, +shortness of breath,  No wheezing  Cardiovascular:  negative for chest pain, chest pressure/discomfort, lower extremity edema, palpitations  Gastrointestinal:  negative for abdominal pain, constipation, diarrhea, nausea, vomiting  Neurological:  negative for dizziness, headache    Medications: Allergies:     Allergies   Allergen Reactions    Cephalexin      Gets yeast infection    Codeine        Current Meds:   Scheduled Meds:    albuterol sulfate HFA  2 puff Inhalation 4x daily    And    ipratropium  2 puff Inhalation 4x daily    famotidine  20 mg Oral Daily    insulin glargine  20 Units SubCUTAneous BID    dexamethasone  20 mg IntraVENous Daily    Followed by   Felecia Lemus ON 9/27/2021] dexamethasone  10 mg IntraVENous Daily    aspirin  81 mg Oral Daily    ferrous sulfate  325 mg Oral Daily with breakfast    sodium chloride flush  5-40 mL IntraVENous 2 times per day    enoxaparin  30 mg SubCUTAneous BID    Vitamin D  2,000 Units Oral Daily     Continuous Infusions:    dextrose      insulin (HUMAN R) non-weight based infusion 9.9 Units/hr (09/23/21 0712)    dextrose 5 % and 0.45 % NaCl 150 mL/hr at 09/23/21 0223    sodium chloride 25 mL (09/22/21 1822)     PRN Meds: potassium chloride **OR** potassium alternative oral replacement **OR** potassium chloride, albuterol sulfate HFA, glucose, dextrose, glucagon (rDNA), dextrose, magnesium sulfate, sodium phosphate IVPB **OR** sodium phosphate IVPB **OR** sodium phosphate IVPB, dextrose 5 % and 0.45 % NaCl, sodium chloride flush, sodium chloride, ondansetron **OR** ondansetron, magnesium hydroxide, acetaminophen **OR** acetaminophen, dextromethorphan-guaiFENesin    Data:     Past Medical History:   has a past medical history of Anemia, Chronic back pain, Diabetes mellitus ([address] H/O LEEP, Hypertension, and S/P endometrial ablation. Social History:   reports that she has never smoked. She has never used smokeless tobacco. She reports current alcohol use. She reports that she does not use drugs. Family History:   Family History   Problem Relation Age of Onset    Hypotension Mother     Heart Disease Father     Heart Failure Father        Vitals:  /74   Pulse 66   Temp 98.3 °F (36.8 °C) (Axillary)   Resp 29   Ht 5' 3\" (1.6 m)   Wt 270 lb 4.8 oz (122.6 kg)   SpO2 91%   BMI 47.88 kg/m²   Temp (24hrs), Av.7 °F (36.5 °C), Min:97.5 °F (36.4 °C), Max:98.3 °F (36.8 °C)    Recent Labs     21  0356 21  0518 21  0617 21  0712   POCGLU 232* 218* 171* 159*       I/O (24Hr):     Intake/Output Summary (Last 24 hours) at 2021 0753  Last data filed at 2021  Gross per 24 hour   Intake 816 ml   Output 420 ml   Net 396 ml       Labs:  Hematology:  Recent Labs     21  0533   WBC 4.5 3.6  --    RBC 5.29* 4.91  --    HGB 15.6* 14.3  --    HCT 45.7 42.0  --    MCV 86.4 85.5  --    MCH 29.5 29.1  --    MCHC 34.1 34.0  --    RDW 13.2 13.1  --     169  --    MPV 10.7 10.7  --    .7* 205.8* 128.3*   DDIMER 0.54  --   --      Chemistry:  Recent Labs     21  0031 21  0533    135 134*   K 4.1 3.9 4.2   CL 99 100 104   CO2 24 20 19*   GLUCOSE 358* 294* 203*   BUN 19 22* 21*   CREATININE 0.76 0.69 0.64   MG 2.0 2.1 2.3   ANIONGAP 12 15 11   LABGLOM >60 >60 >60   GFRAA >60 >60 >60   CALCIUM 8.7 8.4* 8.4*   PHOS 2.1* 1.2* 1.7*     Recent Labs     21  2122 21  0146 21  0206 21  0300 09/23/21  0356 09/23/21  0518 09/23/21  0617 09/23/21  0712   PROT 7.0  --   --   --   --   --   --   --    LABALBU 3.6  --   --   --   --   --   --   --    AST 48*  --   --   --   --   --   --   --    ALT 38*  --   --   --   --   --   --   --    ALKPHOS 84  --   --   --   --   --   --   --    BILITOT 0.54  --   --   --   --   --   --   --    BILIDIR 0.19  --   --   --   --   --   --   --    POCGLU  --    < > 237* 241* 232* 218* 171* 159*    < > = values in this interval not displayed. Radiology:  XR CHEST PORTABLE    Result Date: 9/21/2021  Multifocal airspace opacities worrisome multifocal atypical viral pneumonia. Low lung volumes       Physical Examination:        General appearance:  alert, cooperative and no distress  Mental Status:  oriented to person, place and time and normal affect  Lungs: Tachypneic, diminished posteriorly, low inspiratory effort, on NRB and high flow nasal cannula  Heart:  regular rate and rhythm, no murmur  Abdomen: obese, soft, nontender, nondistended, normal bowel sounds, no masses, hepatomegaly, splenomegaly  Extremities:  no edema, redness, tenderness in the calves  Skin:  no gross lesions, rashes, induration    Assessment:        Hospital Problems         Last Modified POA    Pneumonia due to COVID-19 virus 9/21/2021 Yes    Hypertension 9/22/2021 Yes    Diabetes mellitus ([address] 9/22/2021 Yes    Elevated C-reactive protein (CRP) 9/22/2021 Yes    Sepsis ([address] 9/22/2021 Yes    Diabetic ketoacidosis without coma associated with type 2 diabetes mellitus ([address] 9/23/2021 Yes          Plan:        COVID-19 viral pneumonia:   - Infectious disease following.    - Continue isolation precautions. - Decadron day 3/10  high flow/BiPAP as warranted. - Remdesivir started in the emergency department- discontinued by ID   - Actemra received 9/22    Acute hypoxic respiratory failure:   - Guarded.     - Patient continues on high flow 93% FiO2 40 L and nonrebreather  -  transition to BiPAP  - pulmonary following    Sepsis from covid: Secondary to #1    Elevated CRP: Uptrending since admission    Essential hypertension: Currently stable    Diabetes mellitus type 2:   - Increase Lantus to 20 units twice daily  - Patient initiated on insulin infusion yesterday evening  - Blood sugars improving  -Anion gap closed  -Discontinue insulin drip and start AC at bedtime coverage with high intensity    Morbid obesity: Weight loss encouraged    GI/DVT prophylaxis: Pepcid, Lovenox twice daily    SONJA Olivera NP  9/23/2021  7:53 AM

## 2021-09-23 NOTE — PROGRESS NOTES
[facility] -   Infectious diseases evaluation  admission date 9/21/2021    reason for consultation:   covid pneumonia   Impression :   Current:  · covid pneumonia   · Increased infl markers  · Obese  · DM 2  Other:  ·   Discussion / summary of stay / plan of care   ·   Recommendations   · Decadron   · Actemra given 9/22 - responded by CRP drop  · Anticoagulation  · FU CRP response and o2 requiremnent     Stop isolation 10/3  Infection Control Recommendations   · [place] Precautions  · Contact Isolation   · Airborne isolation  · Droplet Isolation      Antimicrobial Stewardship Recommendations   · Off AB      Coordination ofOutpatient Care:   · Estimated Length of IV antimicrobials:  · Patient will need Midline / picc Catheter Insertion:   · Patient will need SNF:  · Patient will need outpatient wound care:     History of Present Illness:   Initial history:  Marina Sheehan is a 45y.o.-year-old female   covid pneumonia  - symptoms since 9/13/21  iocreased infl markers      Interval changes  9/23/2021   Patient Vitals for the past 8 hrs:   BP Temp Temp src Pulse Resp SpO2   09/23/21 1200 110/76 98.2 °F (36.8 °C) Axillary 68 (!) 31 97 %   09/23/21 1149     (!) 40 96 %   09/23/21 1145     (!) 34    09/23/21 0848 115/64 98 °F (36.7 °C) Axillary 81 25 (!) 88 %   09/23/21 0827     24 90 %   no fever   On hi sharad 70%, increased  Feels better in general -   No dysuria and no diarrhea  No rash  CRP better after actemra      Summary of relevant labs:  Labs:  W   - 128  Micro:    Imaging:  CXR multifocal pneumonia     I have personally reviewed the past medical history, past surgical history, medications, social history, and family history, and I haveupdated the database accordingly. Allergies:   Cephalexin and Codeine     Review of Systems:     Review of Systems   Constitutional: Positive for activity change and fatigue. Negative for fever.    HENT: Positive for congestion. Eyes: Negative for photophobia. Respiratory: Positive for shortness of breath. Cardiovascular: Negative for chest pain. Gastrointestinal: Negative for abdominal distention. Endocrine: Negative for polyphagia. Genitourinary: Negative for dysuria. Musculoskeletal: Negative for arthralgias. Skin: Negative for color change. Allergic/Immunologic: Negative for immunocompromised state. Neurological: Negative for dizziness. Hematological: Negative for adenopathy. Psychiatric/Behavioral: Negative for agitation. Physical Examination :       Physical Exam  Constitutional:       Appearance: Normal appearance. She is obese. She is ill-appearing. HENT:      Head: Normocephalic and atraumatic. Nose: Nose normal.      Mouth/Throat:      Mouth: Mucous membranes are moist.   Eyes:      General: No scleral icterus. Conjunctiva/sclera: Conjunctivae normal.   Cardiovascular:      Rate and Rhythm: Normal rate and regular rhythm. Heart sounds: Normal heart sounds. No murmur heard. Pulmonary:      Effort: No respiratory distress. Breath sounds: Normal breath sounds. Abdominal:      General: There is no distension. Palpations: Abdomen is soft. There is no mass. Tenderness: There is no abdominal tenderness. Genitourinary:     Comments: No hernandez  Musculoskeletal:         General: No swelling or deformity. Cervical back: Neck supple. No rigidity. Skin:     General: Skin is dry. Coloration: Skin is not jaundiced. Neurological:      General: No focal deficit present. Mental Status: She is alert and oriented to person, place, and time. Psychiatric:         Mood and Affect: Mood normal.         Thought Content:  Thought content normal.         Past Medical History:     Past Medical History:   Diagnosis Date    Anemia     Chronic back pain     Diabetes mellitus ([address]     H/O LEEP     Hypertension     S/P endometrial ablation Past Surgical  History:     Past Surgical History:   Procedure Laterality Date    ENDOMETRIAL ABLATION      LEEP         Medications:      insulin lispro  0-18 Units SubCUTAneous TID WC    insulin lispro  0-9 Units SubCUTAneous Nightly    albuterol sulfate HFA  2 puff Inhalation 4x daily    And    ipratropium  2 puff Inhalation 4x daily    famotidine  20 mg Oral Daily    insulin glargine  20 Units SubCUTAneous BID    dexamethasone  20 mg IntraVENous Daily    Followed by   Felecia Lemus ON 9/27/2021] dexamethasone  10 mg IntraVENous Daily    aspirin  81 mg Oral Daily    ferrous sulfate  325 mg Oral Daily with breakfast    sodium chloride flush  5-40 mL IntraVENous 2 times per day    enoxaparin  30 mg SubCUTAneous BID    Vitamin D  2,000 Units Oral Daily       Social History:     Social History     Socioeconomic History    Marital status: Single     Spouse name: Not on file    Number of children: Not on file    Years of education: Not on file    Highest education level: Not on file   Occupational History    Not on file   Tobacco Use    Smoking status: Never Smoker    Smokeless tobacco: Never Used   Substance and Sexual Activity    Alcohol use: Yes     Comment: occa.  Drug use: No    Sexual activity: Not on file   Other Topics Concern    Not on file   Social History Narrative    Not on file     Social Determinants of Health     Financial Resource Strain:     Difficulty of Paying Living Expenses:    Food Insecurity:     Worried About Running Out of Food in the Last Year:     [address] in the Last Year:    Transportation Needs:     Lack of Transportation (Medical):      Lack of Transportation (Non-Medical):    Physical Activity:     Days of Exercise per Week:     Minutes of Exercise per Session:    Stress:     Feeling of Stress :    Social Connections:     Frequency of Communication with Friends and Family:     Frequency of Social Gatherings with Friends and Family:     Attends [employer] Services:     Active Member of Clubs or Organizations:     Attends Club or Organization Meetings:     Marital Status:    Intimate Partner Violence:     Fear of Current or Ex-Partner:     Emotionally Abused:     Physically Abused:     Sexually Abused:        Family History:     Family History   Problem Relation Age of Onset    Hypotension Mother     Heart Disease Father     Heart Failure Father       Medical Decision Making:   I have independently reviewed/ordered the following labs:    CBC with Differential:   Recent Labs     09/21/21 2122 09/22/21  0624   WBC 4.5 3.6   HGB 15.6* 14.3   HCT 45.7 42.0    169   LYMPHOPCT 14*  --    MONOPCT 5  --      BMP:  Recent Labs     09/23/21  0031 09/23/21  0533    134*   K 3.9 4.2    104   CO2 20 19*   BUN 22* 21*   CREATININE 0.69 0.64   MG 2.1 2.3     Hepatic Function Panel:   Recent Labs     09/21/21 2122   PROT 7.0   LABALBU 3.6   BILIDIR 0.19   IBILI 0.35   BILITOT 0.54   ALKPHOS 84   ALT 38*   AST 48*     No results for input(s): RPR in the last 72 hours. No results for input(s): HIV in the last 72 hours. No results for input(s): BC in the last 72 hours. Lab Results   Component Value Date    CREATININE 0.64 09/23/2021    GLUCOSE 203 09/23/2021       Detailed results: Thank you for allowing us to participate in the care of this patient. Please call with questions. This note is created with the assistance of a speech recognition program.  While intending to generate adocument that actually reflects the content of the visit, the document can still have some errors including those of syntax and sound a like substitutions which may escape proof reading. It such instances, actual meaningcan be extrapolated by contextual diversion.     Alicia Rivera MD  Office: [phone]  Perfect serve / office [phone]

## 2021-09-24 NOTE — PLAN OF CARE
NEEDED   NON INVASIVE VENTILATION  PROVIDE OPTIMAL VENTILATION/ACCEPTABLE SP02  IMPLEMENT NON INVASIVE VENTILATION PROTOCOL  ASSESSMENT SKIN INTEGRITY  PATIENT EDUCATION AS NEEDED  BIPAP AS NEEDED   PROVIDE ADEQUATE OXYGENATION WITH ACCEPTABLE SP02/ABG'S    [x]  IDENTIFY APPROPRIATE OXYGEN THERAPY  [x]   MONITOR SP02/ABG'S AS NEEDED   [x]   PATIENT EDUCATION AS NEEDED

## 2021-09-24 NOTE — PROGRESS NOTES
[facility]  Progress Note    Desire Sánchez  MRN: [id number]  Date: 9/24/2021  LOS: 3     Reason for F/U :   COVID-19 virus pneumonia    Impression :   1. COVID-19 virus infection tested + 9/17/2021  2. Acute hypoxic respiratory failure  3. Elevated inflammatory marker with CRP greater than 200  · Responded well to medical treatment  4. Asymptomatic bacteriuria with E. coli isolated in the urine  5. Morbid obesity  6. Diabetes mellitus type 2    Recommendations:   · Continue high-dose Decadron and is on 20 mg IV daily then will taper down to 10 mg IV daily for 5 days on 9/27/2021  · The patient received a dose of Actemra 9/22/2021  · CRP is continued to trend down and the patient has responded well to therapy. · She continues on high flow O2 by nasal cannula currently on 60 L at 90% but does not require the nonrebreather mask. · The patient is using BiPAP at night. · While she continues to feel short of breath I think she is making good clinical progress  · No need to treat for the E. coli in the urine as the patient currently is asymptomatic  · Continue supportive therapy and monitor for complications    Infection Control Recommendations:   Droplet plus precautions    Discharge Planning:   Patient will need Midline Catheter Insertion/ PICC line Insertion: No  Patient will need: Home IV , [place],  SNF,  LTAC: Undetermined  Patient willneed outpatient wound care: No    Medical Decision making / Summary of Stay:   Desire Sánchez is a 45y.o.-year-old female who was initially admitted on 9/21/2021. Talbot Runner is an unvaccinated 67-year-old woman with a history of diabetes mellitus type 2, hypertension, chronic back pain, anemia and morbid obesity among other medical problems. She reports that on Tuesday, September 13, 2021 she started having symptoms with a cough, headache, generalized malaise.   She does not report any subjective fevers, chills or sweats but subsequently started to develop some shortness of breath as well as some diarrhea. The diarrhea though she could not quite say if it was related to this illness as she does have irritable bowel syndrome and intermittently does have diarrhea. The patient did subsequently have COVID-19 testing done on 2021 and was found to be Covid positive. By Saturday she reports that she was having significant episodes of shortness of breath which prompted her to come into the emergency department for evaluation.     In the ED he was noted to be hypoxic with saturations in the 80s and was started on 6 L of oxygen by nasal cannula and due to worsening hypoxia the patient was placed on high flow O2 by nasal cannula at 40 L and 100% and was also placed on a nonrebreather. The BiPAP was also ordered and the patient was seen by the pulmonology service earlier today. The patient had initially been started on remdesivir and Decadron and the Decadron dose was increased to high-dose Decadron today. The patient was given Actemra 2021    Current evaluation:2021    /76   Pulse 67   Temp 98.3 °F (36.8 °C) (Axillary)   Resp (!) 38   Ht 5' 3\" (1.6 m)   Wt 270 lb 4.8 oz (122.6 kg)   SpO2 93%   BMI 47.88 kg/m²     Temperature Range: Temp: 98.3 °F (36.8 °C) Temp  Av.2 °F (36.8 °C)  Min: 98 °F (36.7 °C)  Max: 98.5 °F (36.9 °C)  The patient is seen and evaluated at bedside she is lying in bed on high flow O2 by nasal cannula at 6 L and 90%. She did use BiPAP at 60% overnight. She continues to feel short of breath. No subjective fevers or chills. No diarrhea. Review of Systems   Constitutional: Negative. Respiratory: Positive for shortness of breath. Cardiovascular: Negative. Gastrointestinal: Negative. Genitourinary: Negative. Musculoskeletal: Negative. Skin: Negative. Neurological: Negative. Psychiatric/Behavioral: Negative.         Physical Examination :     Physical Exam  Constitutional: Appearance: She is well-developed. She is obese. HENT:      Head: Normocephalic and atraumatic. Cardiovascular:      Rate and Rhythm: Regular rhythm. Heart sounds: Normal heart sounds. Pulmonary:      Effort: Respiratory distress present. Breath sounds: Normal breath sounds. Abdominal:      General: Bowel sounds are normal.      Palpations: Abdomen is soft. Musculoskeletal:      Cervical back: Neck supple. Skin:     General: Skin is warm and dry. Neurological:      Mental Status: She is alert and oriented to person, place, and time. Laboratory data:   I have independently reviewed the followinglabs:  CBC with Differential:   Recent Labs     09/21/21 2122 09/22/21  0624   WBC 4.5 3.6   HGB 15.6* 14.3   HCT 45.7 42.0    169   LYMPHOPCT 14*  --    MONOPCT 5  --      BMP:   Recent Labs     09/23/21  0031 09/23/21  0533    134*   K 3.9 4.2    104   CO2 20 19*   BUN 22* 21*   CREATININE 0.69 0.64   MG 2.1 2.3     Hepatic Function Panel:   Recent Labs     09/21/21 2122   PROT 7.0   LABALBU 3.6   BILIDIR 0.19   IBILI 0.35   BILITOT 0.54   ALKPHOS 84   ALT 38*   AST 48*         Lab Results   Component Value Date    PROCAL 0.13 09/21/2021     Lab Results   Component Value Date    CRP 47.2 09/24/2021    .3 09/23/2021    .8 09/22/2021     No results found for: SEDRATE      Lab Results   Component Value Date    DDIMER 0.54 09/21/2021     Lab Results   Component Value Date    FERRITIN 706 09/22/2021    FERRITIN 756 09/21/2021     No results found for: LDH  Lab Results   Component Value Date    FIBRINOGEN 583 09/21/2021       Results in Past 30 Days  Result Component Current Result Ref Range Previous Result Ref Range   SARS-CoV-2, Rapid DETECTED (A) (9/21/2021) Not Detected Not in Time Range      Lab Results   Component Value Date    COVID19 DETECTED 09/21/2021       No results for input(s): [facility] in the last 72 hours.     Imaging Studies:   No new imaging    Cultures:     Culture, Urine [[id number]] (Abnormal) Collected: 09/22/21 1925   Order Status: Completed Specimen: Urine Random Updated: 09/23/21 1436    Specimen Description . Random Urine    Special Requests NOT REPORTED    Culture ESCHERICHIA COLI 50 to 100,000 CFU/MLAbnormal      COVID-19, Rapid [[id number]] (Abnormal) Collected: 09/21/21 2123   Order Status: Completed Specimen: Nasopharyngeal Swab Updated: 09/21/21 2154    Specimen Description . NASOPHARYNGEAL SWAB    SARS-CoV-2, Rapid DETECTEDAbnormal     Comment:        Rapid NAAT: The specimen is POSITIVE for SARS-Cov-2, the novel coronavirus associated with   COVID-19.         This test has been authorized by the FDA under an Emergency Use Authorization (EUA) for use   by authorized laboratories.         The ID NOW COVID-19 assay is designed to detect the virus that causes COVID-19 in patients   with signs and symptoms of infection who are suspected of COVID-19. An individual without symptoms of COVID-19 and who is not shedding SARS-CoV-2 virus would   expect to have a negative (not detected) result in this assay.    Fact sheet for Healthcare Providers: BuildHer.es   Fact sheet for Patients: BuildHer.es           Methodology: Isothermal Nucleic Acid Amplification         Results reported to the appropriate Health Department               Medications:      insulin lispro  0-18 Units SubCUTAneous TID WC    insulin lispro  0-9 Units SubCUTAneous Nightly    albuterol sulfate HFA  2 puff Inhalation 4x daily    And    ipratropium  2 puff Inhalation 4x daily    famotidine  20 mg Oral Daily    insulin glargine  20 Units SubCUTAneous BID    dexamethasone  20 mg IntraVENous Daily    Followed by   Matt Leaks ON 9/27/2021] dexamethasone  10 mg IntraVENous Daily    aspirin  81 mg Oral Daily    ferrous sulfate  325 mg Oral Daily with breakfast    sodium chloride flush  5-40 mL IntraVENous 2 times per day    enoxaparin  30 mg SubCUTAneous BID    Vitamin D  2,000 Units Oral Daily           [facility]  Reynold Montalvo MD  [employer] messaging  OFFICE: [phone]      Electronically signed by Reynold Montalvo MD on 9/24/2021 at 7:35 AM  Thank you for allowing us to participate in the care of this patient. Please call with questions. This note iscreated with the assistance of a speech recognition program.  While intending to generate a document that actually reflects the content of the visit, the document can still have some errors including those of syntax andsound a like substitutions which may escape proof reading. In such instances, actual meaning can be extrapolated by contextual diversion.

## 2021-09-24 NOTE — CARE COORDINATION
[facility] Flow/Interdisciplinary Rounds Progress Note    Quality Flow Rounds held on September 24, 2021 at [address] [facility] Johanne Attending:  Bedside Nurse, ,  and Nursing Unit Leadership    Barriers to Discharge:high sharad O2 needs, still on IV decadron for covid+    Anticipated Discharge Date:       Anticipated Discharge Disposition:    Readmission Risk              Risk of Unplanned Readmission:  18           Discussed patient goal for the day, patient clinical progression, and barriers to discharge. The following Goal(s) of the Day/Commitment(s) have been identified:      Goal is home with children. May need LTAC. Pt's FiO2 now 95%/60L.  Still receiving IV decadron for Covid +    Ekta Byrne RN  September 24, 2021

## 2021-09-24 NOTE — PROGRESS NOTES
Pts RR has been in the 30s-40s all day. Pt is currently on bipap, physician notified, and RT called to bedside to evaluate pt, will continue to monitor.

## 2021-09-24 NOTE — PROGRESS NOTES
Occupational Therapy  Facility/Department: [facility] CAR 2  Daily Treatment Note  NAME: Karlie Maya  : 1980  MRN: [id number]    Date of Service: 2021    Discharge Recommendations:  Patient would benefit from continued therapy after discharge       Assessment   Performance deficits / Impairments: Decreased functional mobility ; Decreased ADL status; Decreased endurance;Decreased high-level IADLs  Prognosis: Good  Patient Education: OT POC, transfer/walker safety, importance of participation in therapy, pursed lip breathing, EC/WS, use of incentive spirometer with good return. REQUIRES OT FOLLOW UP: Yes  Activity Tolerance  Activity Tolerance: Patient Tolerated treatment well;Treatment limited secondary to medical complications (free text)  Activity Tolerance: SOB, decreased O2 saturation with activity. Safety Devices  Safety Devices in place: Yes  Type of devices: Call light within reach;Gait belt;Left in chair;Nurse notified         Patient Diagnosis(es): The encounter diagnosis was COVID-19.      has a past medical history of Anemia, Chronic back pain, Diabetes mellitus ([address] H/O LEEP, Hypertension, and S/P endometrial ablation. has a past surgical history that includes LEEP and Endometrial ablation. Restrictions  Restrictions/Precautions  Restrictions/Precautions: Isolation, Up as Tolerated, Fall Risk (Covid)  Required Braces or Orthoses?: No  Position Activity Restriction  Other position/activity restrictions: Hi-sharad NC 60 Lm. Subjective   General  Patient assessed for rehabilitation services?: Yes  Family / Caregiver Present: No  General Comment  Vital Signs  Patient Currently in Pain: Denies   Orientation  Orientation  Overall Orientation Status: Within Functional Limits  Objective    ADL  Grooming: Setup;Modified independent  (Wash face seated EOB.)  Additional Comments: Pt transferred to seated EOB. Pt educated on pursed lip breathing technique, pt demo understanding.  Pt anxious after transferring to seated EOB d/t SOB. Pt incorporated breathing technique to raise O2 levels with good return. Pt initailly 75% after transferring to EOB. Pt able to elevated O2 saturation to 95% after 2 minutes. Emotional support provided to pt d/t anxiety of current situation, pt apprecaited time taken. Pt washed face seated EOB declining further ADL care at this time. Pt transferred EOB/chair using RW for support as needed. Pt educated on incorporating breathing technique after transfers and exertion, pt verbalized understanding. Pt educated on use of incentive spirometer, pt verbalized/demo understanding. Balance  Sitting Balance: Supervision (Seated EOB x10 minutes.)  Standing Balance: Contact guard assistance  Standing Balance  Time: 2 minutes  Activity: Static standing EOB using RW, short functional mobility EOB/chair. Functional Mobility  Functional - Mobility Device: Rolling Walker  Activity: Other  Assist Level: Contact guard assistance  Bed mobility  Supine to Sit: Stand by assistance  Scooting: Supervision  Comment: Pt displayed immediate drop in O2 saturation after supine/sit transfer. Emotional support and instruction on pursed lip breathing provided to pt with good return.   Transfers  Sit to stand: Contact guard assistance  Stand to sit: Contact guard assistance  Cognition  Overall Cognitive Status: [facility]  Plan   Plan  Times per week: 2-4x/week  Current Treatment Recommendations: Safety Education & Training, Patient/Caregiver Education & Training, Self-Care / ADL, Functional Mobility Training, Equipment Evaluation, Education, & procurement, Home Management Training, Endurance Training  AM-PAC Score             Goals  Short term goals  Time Frame for Short term goals: By discharge pt will:  Short term goal 1: Demo bed mobility IND with HOB flat to mimic home setup  Short term goal 2: Demo +10 minutes of dynamic sitting balance throughout ADL tasks with SUP  Short term goal 3: Demo UB ADLs with Mod IND  Short term goal 4: Demo LB ADLs with SUP, use of DME PRN  Short term goal 5: Demo use of pursed lip breathing technique IND throughout all functional tasks PRN with 0 VCs for intiation  Short term goal 6: Notify OTR/L when goals are appropriate to progress       Therapy Time   Individual Concurrent Group Co-treatment   Time In 1453         Time Out 1522         Minutes 29         Timed Code Treatment Minutes: 29 Minutes     Pt supine in bed upon therapist arrival. Pleasant and agreeable to therapy with encouragement. See above for LOF for all tasks. Pt retired to seated in chair at end of session with call light within reach. Will collaborate with OTR to update mobility goals.     SOTERO Jarvis/L

## 2021-09-24 NOTE — PROGRESS NOTES
Pts RR have been in the 30s-40s all day. Physician notified, ABG ordered and obtained. Physician came to bedside to assess. No new orders as this time.

## 2021-09-24 NOTE — PLAN OF CARE
Problem: Airway Clearance - Ineffective  Goal: Achieve or maintain patent airway  Outcome: Ongoing     Problem: Gas Exchange - Impaired  Goal: Absence of hypoxia  Outcome: Ongoing  Goal: Promote optimal lung function  Outcome: Ongoing     Problem: Breathing Pattern - Ineffective  Goal: Ability to achieve and maintain a regular respiratory rate  Outcome: Ongoing     BRONCHOSPASM/BRONCHOCONSTRICTION     [x]         IMPROVE AERATION/BREATH SOUNDS  [x]   ADMINISTER BRONCHODILATOR THERAPY AS APPROPRIATE  [x]   ASSESS BREATH SOUNDS  []   IMPLEMENT AEROSOL/MDI PROTOCOL  [x]   PATIENT EDUCATION AS NEEDED

## 2021-09-24 NOTE — PROGRESS NOTES
[facility]  Office: [address], DO, Pennington Ana, DO, Kayode Freeman, DO, Grupo Campbell Blood, DO, Felton Nichols MD, Herb Giordano MD, Joann Caballero MD, Jovanny Alcala MD, Elliot Briggs MD, Lyssa Patel MD, Justin Bond MD, Candi Larose, DO, Chantelle Gomez, DO, Mata Greene MD,  Massimo Wallace, DO, Nitin Moon MD, Phillip Huerta MD, Hollie Webb MD, Marbella Molina MD, , Inés Shetty MD, Meron Maharaj MD, Ana Artis MD, Bria Leong, [facility], [facility], CNP, Brown Chambers, CNP, Christina Blackburn, [facility], Brian Solis, CNP, Mel Hunter, CNP, Brandon Mora, CNP, Aleksandr Clarke, CNP, Patience Jones, CNP, SHILPA CaraballoC, Felicia Taylor, [facility], Reba Jessica, CNP, Bk Randall, CNP, Lamine Lebron, CNP, Lucho Jang, CNP, Alber Hernandez, CNP, Verónica Harper, CNP, Rogelio Mckeon, [facility], Mariah Gaines, [address] [place] [address]    Progress Note    9/24/2021    7:38 AM    Name:   Dianne Pacheco  MRN:     [id number]     Acct:      [de-identified]   Room:   2021/2021-01   Day:  3  Admit Date:  9/21/2021  8:43 PM    PCP:   Gricel Mcmahon  Code Status:  Full Code    Subjective:     C/C:   Chief Complaint   Patient presents with   Tracee Merazard Other     COVID+    Shortness of Breath     Interval History Status: Improved    Patient evaluated in room continues on high flow O2, just came off BiPAP which she wears at night. Continues to state fatigue with shortness of breath and cough as well as nasal irritation from the high flow. Brief History:     Dianne Pacheco is a 45 y.o. Non- / non  female who presents with Other (COVID+) and Shortness of Breath   and is admitted to the hospital for the management of Covid 19 Pneumonia      Patient presented to the emergency room for the concern of worsening COVID-19 Symptoms. Patient was originally seen Patient was originally diagnosed with the COVID 4 days prior. Patient states since diagnosis she has been having increased shortness of breath, cough  With water-like diarrhea. Upon arrival to the emergency room patient was noted to be hypoxic with saturations in the 80's  she required 6 lpm nc supplemental oxygen.         Review of Systems:     Constitutional:  negative for chills, fevers, sweats, + fatigue  Respiratory:  +cough, +dyspnea on exertion, +shortness of breath,  No wheezing  Cardiovascular:  negative for chest pain, chest pressure/discomfort, lower extremity edema, palpitations  Gastrointestinal:  negative for abdominal pain, constipation, diarrhea, nausea, vomiting  Neurological:  negative for dizziness, headache    Medications: Allergies:     Allergies   Allergen Reactions    Cephalexin      Gets yeast infection    Codeine        Current Meds:   Scheduled Meds:    insulin lispro  0-18 Units SubCUTAneous TID WC    insulin lispro  0-9 Units SubCUTAneous Nightly    albuterol sulfate HFA  2 puff Inhalation 4x daily    And    ipratropium  2 puff Inhalation 4x daily    famotidine  20 mg Oral Daily    insulin glargine  20 Units SubCUTAneous BID    dexamethasone  20 mg IntraVENous Daily    Followed by   Alicia Derrell ON 9/27/2021] dexamethasone  10 mg IntraVENous Daily    aspirin  81 mg Oral Daily    ferrous sulfate  325 mg Oral Daily with breakfast    sodium chloride flush  5-40 mL IntraVENous 2 times per day    enoxaparin  30 mg SubCUTAneous BID    Vitamin D  2,000 Units Oral Daily     Continuous Infusions:    dextrose      dextrose      sodium chloride 25 mL (09/22/21 1822)     PRN Meds: glucose, dextrose, glucagon (rDNA), dextrose, ALPRAZolam, potassium chloride **OR** potassium alternative oral replacement **OR** potassium chloride, albuterol sulfate HFA, glucose, dextrose, glucagon (rDNA), dextrose, magnesium sulfate, sodium phosphate IVPB **OR** sodium phosphate IVPB **OR** sodium phosphate IVPB, sodium chloride flush, sodium chloride, ondansetron **OR** ondansetron, magnesium hydroxide, acetaminophen **OR** acetaminophen, dextromethorphan-guaiFENesin    Data:     Past Medical History:   has a past medical history of Anemia, Chronic back pain, Diabetes mellitus ([address] H/O LEEP, Hypertension, and S/P endometrial ablation. Social History:   reports that she has never smoked. She has never used smokeless tobacco. She reports current alcohol use. She reports that she does not use drugs. Family History:   Family History   Problem Relation Age of Onset    Hypotension Mother     Heart Disease Father     Heart Failure Father        Vitals:  /76   Pulse 67   Temp 98.3 °F (36.8 °C) (Axillary)   Resp (!) 38   Ht 5' 3\" (1.6 m)   Wt 270 lb 4.8 oz (122.6 kg)   SpO2 93%   BMI 47.88 kg/m²   Temp (24hrs), Av.2 °F (36.8 °C), Min:98 °F (36.7 °C), Max:98.5 °F (36.9 °C)    Recent Labs     21  1147 21  1636 21  0723   POCGLU 234* 193* 239* 320*       I/O (24Hr): Intake/Output Summary (Last 24 hours) at 2021 0738  Last data filed at 2021 0530  Gross per 24 hour   Intake 1135 ml   Output 750 ml   Net 385 ml       Labs:  Hematology:  Recent Labs     21  0624 21  0533 21  0609   WBC 4.5  --  3.6  --   --    RBC 5.29*  --  4.91  --   --    HGB 15.6*  --  14.3  --   --    HCT 45.7  --  42.0  --   --    MCV 86.4  --  85.5  --   --    MCH 29.5  --  29.1  --   --    MCHC 34.1  --  34.0  --   --    RDW 13.2  --  13.1  --   --      --  169  --   --    MPV 10.7  --  10.7  --   --    .7*   < > 205.8* 128.3* 47.2*   DDIMER 0.54  --   --   --   --     < > = values in this interval not displayed.      Chemistry:  Recent Labs     21  0031 21  0533    135 134*   K 4.1 3.9 4.2   CL 99 100 104   CO2 24 20 19*   GLUCOSE 358* 294* 203*   BUN 19 22* 21*   CREATININE 0.76 0.69 0.64   MG 2.0 2.1 2.3   ANIONGAP 12 15 11   LABGLOM >60 >60 >60 GFRAA >60 >60 >60   CALCIUM 8.7 8.4* 8.4*   PHOS 2.1* 1.2* 1.7*     Recent Labs     09/21/21 2122 09/22/21  0146 09/23/21  0712 09/23/21  0842 09/23/21  1147 09/23/21  1636 09/23/21 2011 09/24/21  0723   PROT 7.0  --   --   --   --   --   --   --    LABALBU 3.6  --   --   --   --   --   --   --    AST 48*  --   --   --   --   --   --   --    ALT 38*  --   --   --   --   --   --   --    ALKPHOS 84  --   --   --   --   --   --   --    BILITOT 0.54  --   --   --   --   --   --   --    BILIDIR 0.19  --   --   --   --   --   --   --    POCGLU  --    < > 159* 159* 234* 193* 239* 320*    < > = values in this interval not displayed. Radiology:  XR CHEST PORTABLE    Result Date: 9/21/2021  Multifocal airspace opacities worrisome multifocal atypical viral pneumonia. Low lung volumes       Physical Examination:        General appearance:  alert, cooperative, ill-appearing  Mental Status:  oriented to person, place and time and normal affect  Lungs: Tachypneic, diminished posteriorly, low inspiratory effort, on high flow O2 60 L at 95% FiO2  Heart:  regular rate and rhythm, no murmur  Abdomen: obese, soft, nontender, nondistended, normal bowel sounds, no masses, hepatomegaly, splenomegaly  Extremities:  no edema, redness, tenderness in the calves  Skin:  no gross lesions, rashes, induration    Assessment:        Hospital Problems         Last Modified POA    Pneumonia due to COVID-19 virus 9/21/2021 Yes    Hypertension 9/22/2021 Yes    Diabetes mellitus ([facility] 9/22/2021 Yes    Elevated C-reactive protein (CRP) 9/22/2021 Yes    Seps[facility] ([facility] 9/22/2021 Yes    Diabetic ketoacidosis without coma associated with type 2 diabetes mellitus ([facility] 9/23/2021 Yes          Plan:        COVID-19 viral pneumonia:   - Infectious disease following.    - Continue isolation precautions. - Decadron day 4/10  high flow/BiPAP as warranted.     - Remdesivir started in the emergency department- discontinued by ID   - Actemra received 9/22  - Vitamin C, vitamin D    Acute hypoxic respiratory failure:   - Guarded.     - Patient continues on high flow 95% FiO2 60 L   -  BiPAP at night  - pulmonary following    Sepsis from covid: Secondary to #1    Elevated CRP: improving     Essential hypertension: Currently stable    Diabetes mellitus type 2:   - Increase Lantus to 35 units twice daily  - Patient initiated on insulin infusion on admission  - Insulin drip transitioned off on 9/23  - Blood sugars high but improving since admission    Morbid obesity: Weight loss encouraged    GI/DVT prophylaxis: Pepcid, Lovenox twice daily    SONJA Merino - NP  9/24/2021  7:38 AM

## 2021-09-24 NOTE — PROGRESS NOTES
Pulmonary/Critical Care consultation    Patient's name:  Yessenia Rich  Medical Record Number: [id number]  Patient's account/billing number: [de-identified]  Patient's YOB: 1980  Age: 45 y.o. Date of Admission: 9/21/2021  8:43 PM  Date of Consult: 9/24/2021      Primary Care Physician: Radha Valencia Status: Full Code    Reason for consult: COVID-19 pneumonia with acute hypoxemic respiratory failure      HISTORY OF PRESENT ILLNESS:   History was obtained from chart review and the patient. Yessenia Rich is a 45 y.o. white woman who was unvaccinated was admitted with shortness of breath and diagnosed with COVID-19 pneumonia. She apparently was diagnosed about 4 days ago. There was associated cough that is mostly dry and also having some diarrhea. Patient required increasing oxygen with initial oxygen saturation being in the 80s. No previous history of lung disease  Patient is a non-smoker    Interval history:  Shortness of breath is improving  Cough is improving  Remains on high flow oxygen at 60 L and 95%  Oxygen need has gone up  Patient with chronic respiratory acidosis on blood gases      Past Medical History:        Diagnosis Date    Anemia     Chronic back pain     Diabetes mellitus ([facility] [address]     H/O LEEP     Hypertension     S/P endometrial ablation        Past Surgical History:        Procedure Laterality Date    ENDOMETRIAL ABLATION      LEEP         Allergies: Allergies   Allergen Reactions    Cephalexin      Gets yeast infection    Codeine          Home Meds:   Prior to Admission medications    Medication Sig Start Date End Date Taking?  Authorizing Provider   clobetasol (TEMOVATE) 0.05 % external solution Apply topically 3 times weekly to rash on scalp 6/12/20   Odilia Phillips MD   Salicylic Acid 3 % SHAM Use 3-4 times weekly leave on for 5 minutes prior to washing off scalp 6/12/20   Odilia Phillips MD aspirin 81 MG tablet Take 81 mg by mouth daily    Historical Provider, MD   Probiotic Product (PROBIOTIC PO) Take by mouth    Historical Provider, MD   metFORMIN (GLUCOPHAGE) 500 MG tablet Take 500 mg by mouth 7/24/19   Historical Provider, MD   lisinopril (PRINIVIL;ZESTRIL) 2.5 MG tablet Take 2.5 mg by mouth 7/24/19   Historical Provider, MD   ferrous sulfate 325 (65 Fe) MG tablet Take 325 mg by mouth daily (with breakfast)    Historical Provider, MD   Multiple Vitamins-Minerals (WOMENS MULTIVITAMIN PO) Take by mouth    Historical Provider, MD   loperamide (IMODIUM) 2 MG capsule Take 2 mg by mouth 4 times daily as needed for Diarrhea    Historical Provider, MD       Family History:       Problem Relation Age of Onset    Hypotension Mother     Heart Disease Father     Heart Failure Father          Social History:   TOBACCO:   reports that she has never smoked. She has never used smokeless tobacco.  ETOH:   reports current alcohol use. DRUGS:  reports no history of drug use. OCCUPATION:   There  is not history of TB or TB exposure. There is not asbestos or silica dust exposure. The patient reports does not have coal, foundry, quarry or [employer] exposure. Travel history reveals nothing of significance. There is not  history of recreational or IV drug use. There is not hot tube exposure. The patient does not have pets, dogs, cats turtles or exotic birds.            REVIEW OF SYSTEMS:    Review of Systems -   General ROS: Completed and except as mentioned above were negative   Psychological ROS:  Completed and except as mentioned above were negative  Ophthalmic ROS:  Completed and except as mentioned above were negative  ENT ROS:  Completed and except as mentioned above were negative  Allergy and Immunology ROS:  Completed and except as mentioned above were negative  Hematological and Lymphatic ROS:  Completed and except as mentioned above were negative  Endocrine ROS: Completed and except as mentioned above were negative  Breast ROS:  Completed and except as mentioned above were negative  Respiratory ROS:  Completed and except as mentioned above were negative  Cardiovascular ROS:  Completed and except as mentioned above were negative  Gastrointestinal ROS: Completed and except as mentioned above were negative  Genito-Urinary ROS:  Completed and except as mentioned above were negative  Musculoskeletal ROS:  Completed and except as mentioned above were negative  Neurological ROS:  Completed and except as mentioned above were negative  Dermatological ROS:  Completed and except as mentioned above were negative          Physical Exam:    Vitals: /67   Pulse 72   Temp 97.9 °F (36.6 °C) (Axillary)   Resp 23   Ht 5' 3\" (1.6 m)   Wt 270 lb 4.8 oz (122.6 kg)   SpO2 95%   BMI 47.88 kg/m²     Last Body weight:   Wt Readings from Last 3 Encounters:   09/22/21 270 lb 4.8 oz (122.6 kg)   06/12/20 260 lb (117.9 kg)   02/03/20 259 lb (117.5 kg)       Body Mass Index : Body mass index is 47.88 kg/m². Intake and Output summary:     Intake/Output Summary (Last 24 hours) at 9/24/2021 1037  Last data filed at 9/24/2021 0936  Gross per 24 hour   Intake 1135 ml   Output 1250 ml   Net -115 ml       Physical Examination:   PHYSICAL EXAMINATION:  Vitals:    09/24/21 0855 09/24/21 1000 09/24/21 1005 09/24/21 1010   BP:       Pulse:  75 74 72   Resp:  (!) 33 (!) 34 23   Temp:       TempSrc:       SpO2: 95%      Weight:       Height:         Constitutional: This is a well developed, well nourished, Greater than 40 - Morbid Obesity / Extreme Obesity / Grade III 45y.o. year old female who is alert, oriented, cooperative and in no apparent distress. Head:normocephalic and atraumatic. EENT:   ELVI. No conjunctival injections. Septum was midline, mucosa was without erythema, exudates or cobblestoning. No thrush was noted. Mallampati III (soft palate, base of uvula visible)  Neck: Supple without thyromegaly.  No elevated JVP. Trachea was midline. Respiratory: Chest was symmetrical without dullness to percussion. Breath sounds bilaterally were clear to auscultation. There were no wheezes, rhonchi or rales. There is no intercostal retraction or use of accessory muscles. No egophony noted. Cardiovascular: Regular without murmur, clicks, gallops or rubs. Abdomen: Slightly rounded and soft without organomegaly. No rebound tenderness, rigidity or guarding was appreciated. Lymphatic: No lymphadenopathy. Musculoskeletal: Normal curvature of the spine. No gross muscle weakness. Extremities:  No lower extremity edema, ulcerations, tenderness, varicosities or erythema. Muscle size, tone and strength are normal.  No involuntary movements are noted. Skin:  Warm and dry. Good color, turgor and pigmentation. No lesions or scars. No cyanosis or clubbing  Neurological/Psychiatric: The patient's general behavior, level of consciousness, thought content and emotional status is normal.            Laboratory findings:-    CBC:   Recent Labs     09/24/21  0609   WBC 5.3   HGB 14.7        BMP:    Recent Labs     09/23/21  0031 09/23/21  0031 09/23/21  0533 09/23/21  0533 09/24/21  0609      < > 134*   < > 136   K 3.9   < > 4.2   < > 4.4      < > 104   < > 101   CO2 20   < > 19*   < > 18*   BUN 22*   < > 21*   < > 24*   CREATININE 0.69  --  0.64  --  0.67   GLUCOSE 294*   < > 203*   < > 341*    < > = values in this interval not displayed. S. Calcium:  Recent Labs     09/24/21  0609   CALCIUM 8.2*     S. Ionized Calcium:No results for input(s): IONCA in the last 72 hours. S. Magnesium:  Recent Labs     09/23/21  0533   MG 2.3     S. Phosphorus:  Recent Labs     09/23/21  0533   PHOS 1.7*     S. Glucose:  Recent Labs     09/23/21  1636 09/23/21 2011 09/24/21  0723   POCGLU 193* 239* 320*     Glycosylated hemoglobin A1C: No results for input(s): LABA1C in the last 72 hours.   INR: No results for input(s): INR in the last 72 hours. Hepatic functions:   Recent Labs     09/21/21 2122   ALKPHOS 84   ALT 38*   AST 48*   PROT 7.0   BILITOT 0.54   BILIDIR 0.19   LABALBU 3.6     Pancreatic functions:No results for input(s): LACTA, AMYLASE in the last 72 hours. S. Lactic Acid: No results for input(s): LACTA in the last 72 hours. Cardiac enzymes:No results for input(s): CKTOTAL, CKMB, CKMBINDEX, TROPONINI in the last 72 hours. BNP:No results for input(s): BNP in the last 72 hours. Lipid profile: No results for input(s): CHOL, TRIG, HDL, LDLCALC in the last 72 hours. Invalid input(s): LDL  Blood Gases: No results found for: PH, PCO2, PO2, HCO3, O2SAT  Thyroid functions: No results found for: TSH         Microbiology:    Cultures during this admission:     Blood cultures:      [] None drawn      [] Negative             []  Positive (Details:  )  Urine Culture:        [] None drawn      [] Negative             []  Positive (Details:  )  Sputum Culture:   [] None drawn       [] Negative             []  Positive (Details:  )     COVID-19 rapid test was positive    Radiological reports:    XR CHEST PORTABLE    Result Date: 9/21/2021  Multifocal airspace opacities worrisome multifocal atypical viral pneumonia. Low lung volumes           Assessment and Plan       IMPRESSION:   1. COVID-19        Active Problems:    Pneumonia due to COVID-19 virus    Hypertension    Diabetes mellitus ([facility] [address]    Elevated C-reactive protein (CRP)    Sepsis ([facility] [address]    Diabetic ketoacidosis without coma associated with type 2 diabetes mellitus ([facility] [address]  Resolved Problems:    * No resolved hospital problems.  *  Morbid obesity   Acute hypoxic respiratory failure   Acute respiratory distress syndrome   COVID 19 infection/pneumonia   Chronic respiratory acidosis    Plan:     Continue Airborne isolation   Continue high flow oxygen   Use BiPAP if needed   Obtain X-ray chest as needed    Monitor input/output, with a goal of even/negative fluid balance   Completed remdesivir   On Decadron 20 mg a day   Patient received Actemra on 9/22/2021   Monitor CRP, LDH, AST/ALT/ferritin/ D-dimer   Continue supportive care, tube feeds   GI/DVT prophylaxis   Glycemic control per primary service  On oral diet  Management as per guidance provided by hospital policy and prevailing evidence based medicine, during the COVID-19 pandemic emergency. This patient was evaluated in the context of the global SARS-CoV-2 (COVID-19) pandemic, which necessitated considerations that the patient either has COVID-19 infection or is at risk of infection with COVID-19. Institutional protocols and algorithms that pertain to the evaluation & management of patients with COVID-19 or those at risk for COVID-19 are in a state of rapid changes based on information released by regulatory bodies including the CDC and federal and state organizations. These policies and algorithms were followed during the patient's care. Please note that this chart was generated using voice recognition Dragon dictation software. Although every effort was made to ensure the accuracy of this automated transcription, some errors in transcription may have occurred. Thank you for having us involved in the care of your patient. Please call us if you have any questions or concerns.       Nayan Hernandez MD,             9/24/2021, 10:37 AM

## 2021-09-24 NOTE — PROGRESS NOTES
Physical Therapy  Facility/Department: [facility] 2  Daily Treatment Note  NAME: Kenia Hawkins  : 1980  MRN: [id number]    Date of Service: 2021    Discharge Recommendations:  Patient would benefit from continued therapy after discharge    Assessment   Body structures, Functions, Activity limitations: Decreased functional mobility ; Decreased balance;Decreased endurance;Decreased ADL status  Assessment: The pt performed bed mobility with SBA, amb 5ft to chair ,activity lmited  due to respiratory status, pt very motivated to return to PLOF but is limited by decrease Endurance f. recomending continue therapy to address deficits. .  Prognosis: Good  PT Education: Goals;PT Role;Plan of Care; Functional Mobility Training;Gait Training;Transfer Training  REQUIRES PT FOLLOW UP: Yes  Activity Tolerance  Activity Tolerance: Patient limited by endurance  Activity Tolerance: SPO2 decreasing with least exertion. Patient Diagnosis(es): The encounter diagnosis was COVID-19.     has a past medical history of Anemia, Chronic back pain, Diabetes mellitus ([address] H/O LEEP, Hypertension, and S/P endometrial ablation. has a past surgical history that includes LEEP and Endometrial ablation. Restrictions  Restrictions/Precautions  Restrictions/Precautions: Isolation, Up as Tolerated, Fall Risk (Covid)  Required Braces or Orthoses?: No  Position Activity Restriction  Other position/activity restrictions: Hi-sharad NC 60 Lm. Subjective   General  Response To Previous Treatment: Patient with no complaints from previous session.   Family / Caregiver Present: No  General Comment  Comments: High flow O2  Pain Screening  Patient Currently in Pain: Denies  Vital Signs  Patient Currently in Pain: Denies       Orientation  Orientation  Overall Orientation Status: Within Functional Limits  Cognition      Objective   Bed mobility  Supine to Sit: Stand by assistance  Sit to Supine: Stand by assistance  Scooting: Supervision  Comment: Pt SPO2 decreasing to 75%  with sitting at EOB , increase to 95 with few mins of pulsed lip breathing. Pt very emotional  and reports fear and anxiety. emotional support provided with good return. Transfers  Sit to Stand: Contact guard assistance  Stand to sit: Contact guard assistance  Bed to Chair: Contact guard assistance  Ambulation  Ambulation?: Yes  Ambulation 1  Device: Rolling Walker  Assistance: Contact guard assistance  Gait Deviations: Slow Isabela  Distance: 5ft to chair  Comments: Limited by SOB and O2 sat levels decreasing with least exertion.      Balance  Sitting - Static: Good  Sitting - Dynamic: Good;-  Standing - Static: Good  Standing - Dynamic: Good  Comments: Standing with RW    Goals  Short term goals  Time Frame for Short term goals: 14 visits  Short term goal 1: Perform bed mobility and functional transfers independently  Short term goal 2: Ambulate 300ft without AD and supervision  Short term goal 3: Participate in 30 minutes of therapy with SPO2 >90% to demo increased endurance  Short term goal 4: Ascend/descend 2 steps with HR and SBA    Plan    Plan  Times per week: 3-5x/wk  Current Treatment Recommendations: Strengthening, ROM, Balance Training, Functional Mobility Training, Transfer Training, Stair training, Gait Training, Endurance Training, Home Exercise Program, Safety Education & Training, Patient/Caregiver Education & Training  Safety Devices  Type of devices: Nurse notified, Call light within reach, Left in chair, Gait belt  Restraints  Initially in place: No     Therapy Time   Individual Concurrent Group Co-treatment   Time In 1454         Time Out 1522         Minutes 28         Timed Code Treatment Minutes: [address], PTA

## 2021-09-25 NOTE — PROGRESS NOTES
[facility]  Progress Note    Greg Navas  MRN: [id number]  Date: 9/25/2021  LOS: 4     Reason for F/U :   COVID-19 virus pneumonia    Impression :   COVID-19 virus infection tested + 9/17/2021  Acute hypoxic respiratory failure  Elevated inflammatory marker with CRP greater than 200  Responded well to medical treatment  Asymptomatic bacteriuria with E. coli isolated in the urine  Morbid obesity  Diabetes mellitus type 2    Recommendations:   Continue high-dose Decadron and is on 20 mg IV daily then will taper down to 10 mg IV daily for 5 days on 9/27/2021  The patient received a dose of Actemra 9/22/2021  The patient continues on high flow O2 by nasal cannula is on 40 L and 100%. The patient has been using BiPAP intermittently at 60%. She does still reports that she feels better but continues to be significantly short of breath. Chest x-ray done yesterday did show improved airspace opacities. Infection Control Recommendations:   Droplet plus precautions    Discharge Planning:   Patient will need Midline Catheter Insertion/ PICC line Insertion: No  Patient will need: Home IV , [place],  SNF,  LTAC: Undetermined  Patient willneed outpatient wound care: No    Medical Decision making / Summary of Stay:   Greg Navas is a 45y.o.-year-old female who was initially admitted on 9/21/2021. [facility] is an unvaccinated 36-year-old woman with a history of diabetes mellitus type 2, hypertension, chronic back pain, anemia and morbid obesity among other medical problems. She reports that on Tuesday, September 13, 2021 she started having symptoms with a cough, headache, generalized malaise. She does not report any subjective fevers, chills or sweats but subsequently started to develop some shortness of breath as well as some diarrhea.   The diarrhea though she could not quite say if it was related to this illness as she does have irritable bowel syndrome and intermittently does have diarrhea. The patient did subsequently have COVID-19 testing done on 2021 and was found to be Covid positive. By Saturday she reports that she was having significant episodes of shortness of breath which prompted her to come into the emergency department for evaluation.     In the ED he was noted to be hypoxic with saturations in the 80s and was started on 6 L of oxygen by nasal cannula and due to worsening hypoxia the patient was placed on high flow O2 by nasal cannula at 40 L and 100% and was also placed on a nonrebreather. The BiPAP was also ordered and the patient was seen by the pulmonology service earlier today. The patient had initially been started on remdesivir and Decadron and the Decadron dose was increased to high-dose Decadron today. The patient was given Actemra 2021    CRP is continued to trend down and the patient has responded well to therapy. Chest x-ray 2021 showed improved airspace opacity      Current evaluation:2021    /85   Pulse 77   Temp 98.1 °F (36.7 °C) (Axillary)   Resp 29   Ht 5' 3\" (1.6 m)   Wt 270 lb 4.8 oz (122.6 kg)   SpO2 94%   BMI 47.88 kg/m²     Temperature Range: Temp: 98.1 °F (36.7 °C) Temp  Av.2 °F (36.8 °C)  Min: 97.9 °F (36.6 °C)  Max: 98.6 °F (37 °C)  The patient is seen and evaluated at bedside she remains on high flow O2 by nasal cannula at 40 L and the 100%. She was on 61 L yesterday. The patient is on BiPAP at 60%  The patient clinically reports that she feels better but she continues to look short of breath. No subjective fevers or chills. Review of Systems   Constitutional: Negative. Respiratory: Positive for shortness of breath. Cardiovascular: Negative. Gastrointestinal: Negative. Genitourinary: Negative. Musculoskeletal: Negative. Skin: Negative. Neurological: Negative. Psychiatric/Behavioral: Negative.         Physical Examination :     Physical Exam  Constitutional: Appearance: She is well-developed. She is obese. HENT:      Head: Normocephalic and atraumatic. Cardiovascular:      Rate and Rhythm: Regular rhythm. Heart sounds: Normal heart sounds. Pulmonary:      Effort: Respiratory distress present. Breath sounds: Rales present. Abdominal:      General: Bowel sounds are normal.      Palpations: Abdomen is soft. Musculoskeletal:      Cervical back: Neck supple. Skin:     General: Skin is warm and dry. Neurological:      Mental Status: She is alert and oriented to person, place, and time. Laboratory data:   I have independently reviewed the followinglabs:  CBC with Differential:   Recent Labs     09/24/21  0609   WBC 5.3   HGB 14.7   HCT 44.1        BMP:   Recent Labs     09/23/21  0031 09/23/21  0031 09/23/21  0533 09/24/21  0609      < > 134* 136   K 3.9   < > 4.2 4.4      < > 104 101   CO2 20   < > 19* 18*   BUN 22*   < > 21* 24*   CREATININE 0.69   < > 0.64 0.67   MG 2.1  --  2.3  --     < > = values in this interval not displayed. Hepatic Function Panel:   No results for input(s): PROT, LABALBU, BILIDIR, IBILI, BILITOT, ALKPHOS, ALT, AST in the last 72 hours.       Lab Results   Component Value Date    PROCAL 0.09 09/24/2021    PROCAL 0.13 09/21/2021     Lab Results   Component Value Date    CRP 47.2 09/24/2021    .3 09/23/2021    .8 09/22/2021     No results found for: Zahraa Greening      Lab Results   Component Value Date    DDIMER 0.54 09/21/2021     Lab Results   Component Value Date    FERRITIN 706 09/22/2021    FERRITIN 756 09/21/2021     No results found for: LDH  Lab Results   Component Value Date    FIBRINOGEN 583 09/21/2021       Results in Past 30 Days  Result Component Current Result Ref Range Previous Result Ref Range   SARS-CoV-2, Rapid DETECTED (A) (9/21/2021) Not Detected Not in Time Range      Lab Results   Component Value Date    COVID19 DETECTED 09/21/2021       No results for input(s): Emergency Use Authorization (EUA) for use   by authorized laboratories.         The ID NOW COVID-19 assay is designed to detect the virus that causes COVID-19 in patients   with signs and symptoms of infection who are suspected of COVID-19. An individual without symptoms of COVID-19 and who is not shedding SARS-CoV-2 virus would   expect to have a negative (not detected) result in this assay. Fact sheet for Healthcare Providers: Ping   Fact sheet for Patients: Ping           Methodology: Isothermal Nucleic Acid Amplification         Results reported to the appropriate Health Department               Medications:      insulin glargine  40 Units SubCUTAneous BID    vitamin C  500 mg Oral BID    fluticasone  1 spray Each Nostril Daily    cetirizine  10 mg Oral Daily    insulin lispro  0-18 Units SubCUTAneous TID WC    insulin lispro  0-9 Units SubCUTAneous Nightly    albuterol sulfate HFA  2 puff Inhalation 4x daily    And    ipratropium  2 puff Inhalation 4x daily    famotidine  20 mg Oral Daily    dexamethasone  20 mg IntraVENous Daily    Followed by   Navdeep Ashby ON 9/27/2021] dexamethasone  10 mg IntraVENous Daily    aspirin  81 mg Oral Daily    ferrous sulfate  325 mg Oral Daily with breakfast    sodium chloride flush  5-40 mL IntraVENous 2 times per day    enoxaparin  30 mg SubCUTAneous BID    Vitamin D  2,000 Units Oral Daily           [facility]  Reynold Montalvo MD  [employer] messaging  OFFICE: [phone]      Electronically signed by Reynold Montalvo MD on 9/25/2021 at 10:56 AM  Thank you for allowing us to participate in the care of this patient. Please call with questions.     This note iscreated with the assistance of a speech recognition program.  While intending to generate a document that actually reflects the content of the visit, the document can still have some errors including those of syntax andsound a like substitutions which may escape proof reading. In such instances, actual meaning can be extrapolated by contextual diversion.

## 2021-09-25 NOTE — PROGRESS NOTES
PULMONARY & CRITICAL CARE MEDICINE PROGRESS NOTE     Patient:  Marina Sheehan  MRN: [id number]  Admit date: 9/21/2021  Primary Care Physician: Reba Moreno  Consulting Physician: Aminta Hawley DO  CODE Status: Full Code  LOS: 4     SUBJECTIVE     CHIEF COMPLAINT/REASON FOR INITIAL CONSULT:   Acute respiratory failure/COVID-19 pneumonia    BRIEF HOSPITAL COURSE:   The patient is a 45 y.o. female history of diabetes mellitus, hypertension, morbid obesity  unvaccinated was admitted with shortness of breath and diagnosed with COVID-19 pneumonia. She apparently was diagnosed about 4 days ago. There was associated cough that is mostly dry and also having some diarrhea. Patient required increasing oxygen with initial oxygen saturation being in the 80s. No previous history of lung disease  Patient is a non-smoker    INTERVAL HISTORY:  09/25/21  Overnight events reviewed. Chart seen, labs seen and last imaging studies seen. Patient remained tachypneic with respiratory rate in high 20s and sometime in low 30s. She remains on high flow nasal cannula and this morning she was on 60 L and 90% and she was saturating 90% on high flow nasal cannula. Patient has been on BiPAP also with BiPAP setting up 16/8/60 percent while she is on BiPAP she maintain saturation above 90% but remain tachypneic on BiPAP. No hypotension was reported stable hemodynamically. Patient does complain of cough does have some sputum production denies chest pain or hemoptysis. Does complain of some diarrhea denies fever. Her labs shows blood glucose elevated BUN is 26 creatinine 0.59 bicarbonate 21 sodium 138 WBC 4.9 hemoglobin 15 hematocrit 48 and platelet 682. Chest x-ray on 09/20/2021 shows bilateral pulmonary infiltrate/interstitial infiltrate cannot rule out pleural effusion. Not much change from chest x-ray on 09/21/2021  She received 1 dose of Lasix 20 mg IV this morning.   Her initial CRP was greater than 200 on 09/23 128 and on 2447  ABG on 2021 7.3 5/50/72/28    REVIEW OF SYSTEMS:  Review of Systems   Constitutional: Positive for activity change and fatigue. Negative for appetite change and fever. HENT: Negative for postnasal drip, rhinorrhea, sore throat, trouble swallowing and voice change. Eyes: Negative for photophobia, redness and visual disturbance. Respiratory: Positive for cough and shortness of breath. Negative for wheezing. Cardiovascular: Negative for chest pain, palpitations and leg swelling. Gastrointestinal: Negative for abdominal pain, constipation, diarrhea, nausea and vomiting. Endocrine: Positive for polyuria. Genitourinary: Negative for dysuria, frequency, hematuria and urgency. Musculoskeletal: Negative. Allergic/Immunologic: Negative. Neurological: Negative for dizziness, syncope, speech difficulty and headaches. Hematological: Negative for adenopathy. Does not bruise/bleed easily. Psychiatric/Behavioral: Negative. OBJECTIVE     PaO2/FiO2 RATIO:  Recent Labs     21  0312   POCPO2 72.5*      FiO2 : (S) 90 %     VITAL SIGNS:   LAST:  /80   Pulse 56   Temp 98.2 °F (36.8 °C) (Axillary)   Resp 29   Ht 5' 3\" (1.6 m)   Wt 270 lb 4.8 oz (122.6 kg)   SpO2 96%   BMI 47.88 kg/m²   8-24 HR RANGE:  TEMP Temp  Av.3 °F (36.8 °C)  Min: 97.9 °F (36.6 °C)  Max: 98.6 °F (37 °C)   BP Systolic (20HBO), ZPX:374 , Min:111 , SBU:170      Diastolic (86SRB), GXK:86, Min:69, Max:82     PULSE Pulse  Av.1  Min: 56  Max: 83   RR Resp  Av  Min: 29  Max: 34   O2 SAT SpO2  Av.8 %  Min: 91 %  Max: 96 %   OXYGEN DELIVERY O2 Flow Rate (L/min)  Av L/min  Min: 60 L/min  Max: 60 L/min        SYSTEMIC EXAMINATION:   General appearance - Ill-appearing, mild to moderate respiratory distress  Mental status - awake & alert, follows commands  Eyes - pupils equal and reactive, sclera anicteric  Mouth - mucous membranes moist, pharynx normal without lesions.   Large tongue Mallampati 2  Neck -Short thick neck supple, no significant adenopathy, carotids upstroke normal bilaterally, no bruits  Chest - There is no intercostal recession or use of accessory muscles. Breath sounds bilaterally were dimnished to auscultation at bases. There were mild crackles present at bases. Heart - normal rate, regular rhythm, normal S1, S2, no murmurs, rubs, clicks or gallops  Abdomen - soft, nontender, nondistended, no masses or organomegaly  Neurological - non-focal  Extremities - peripheral pulses normal, mild bilateral pedal edema, no clubbing or cyanosis.   No calf tenderness  Skin - normal coloration and turgor, no rashes, no suspicious skin lesions noted     DATA REVIEW     Medications: Current Inpatient  Scheduled Meds:   insulin glargine  40 Units SubCUTAneous BID    vitamin C  500 mg Oral BID    fluticasone  1 spray Each Nostril Daily    cetirizine  10 mg Oral Daily    insulin lispro  0-18 Units SubCUTAneous TID WC    insulin lispro  0-9 Units SubCUTAneous Nightly    albuterol sulfate HFA  2 puff Inhalation 4x daily    And    ipratropium  2 puff Inhalation 4x daily    famotidine  20 mg Oral Daily    dexamethasone  20 mg IntraVENous Daily    Followed by   Shari Angela ON 9/27/2021] dexamethasone  10 mg IntraVENous Daily    aspirin  81 mg Oral Daily    ferrous sulfate  325 mg Oral Daily with breakfast    sodium chloride flush  5-40 mL IntraVENous 2 times per day    enoxaparin  30 mg SubCUTAneous BID    Vitamin D  2,000 Units Oral Daily     Continuous Infusions:   dextrose      sodium chloride 25 mL (09/22/21 1822)       INPUT/OUTPUT:  In: 7914 [P.O.:1175]  Out: 550 [Urine:550]  Date 09/25/21 0000 - 09/25/21 2359   Shift 0415-2272 6512-5099 4290-4027 24 Hour Total   INTAKE   P.O.(mL/kg/hr) 325(0.3)   325   Shift Total(mL/kg) 325(2.7)   325(2.7)   OUTPUT   Urine(mL/kg/hr) 100(0.1)   100   Shift Total(mL/kg) 100(0.8)   100(0.8)   Weight (kg) 122.6 122.6 122.6 122.6        LABS:  ABGs: effusion. No cardiomegaly was identified. Slight improvement bilaterally has occurred from 09/21/2021. XR CHEST PORTABLE   Final Result   Multifocal airspace opacities worrisome multifocal atypical viral pneumonia. Low lung volumes              Echocardiogram:   No results found for this or any previous visit. ASSESSMENT AND PLAN     Assessment:    // Acute hypoxic respiratory failure  // Acute respiratory distress syndrome  // Bilateral multifocal pneumonia due to COVID 19 infection  // Diabetes mellitus.  // Morbid obesity  // Likely obstructive sleep apnea/hypoventilation  // Hypertension  // E. coli in urine    Plan:    I personally interviewed/examined the patient; reviewed interval history, interpreted all available radiographic and laboratory data at the time of service. Patient is currently on high flow nasal cannula requiring 90% and 60 L to maintain saturation above 90% most of the time she is maintaining 90% but remains tachypneic. Continue with BiPAP every night and also recommend to use BiPAP intermittently during the daytime alternating with high flow. Supplemental O2 to be titrated to keep saturation above 90%. Agree with Lasix may need daily Lasix according to intake and output and electrolytes. She is on high-dose Decadron 20 mg started on 09/22/2021. She is a status post Actemra dose on 09/22/2021. Encourage prone position when sleeping, incentive spirometry  Monitor I/O, electrolytes with a goal of negative fluid balance  Continue to monitor CBC, coagulation profile, transfuse as indicated  Chemical DVT prophylaxis as per protocol on Lovenox 30 mg twice daily  Antimicrobials reviewed; currently not on antibiotic  Monitor CRP, LDH, AST/ALT, D-Dimer, Ferritin periodically  Glycemic control not controlled on Lantus.   Physical/occupational therapy    Patient need to be monitored closely as she is on high flow nasal cannula 90% and on BiPAP and remain tachypneic but there is no increased work of breathing or worsening hypoxia at this time. Discussed with respiratory therapist treatment plan discussed with  Discussed with nursing staff. Cara Valerio MD   Pulmonary and Critical Care Medicine           9/25/2021, 10:12 AM    This patient was evaluated in the context of the global SARS-CoV-2 (COVID-19) pandemic, which necessitated considerations that the patient either has COVID-19 infection or is at risk of infection with COVID-19. Institutional protocols and algorithms that pertain to the evaluation & management of patients with COVID-19 or those at risk for COVID-19 are in a state of rapid changes based on information released by regulatory bodies including the CDC and federal and state organizations. These policies and algorithms were followed during the patient's care. Please note that this chart was generated using voice recognition Dragon dictation software. Although every effort was made to ensure the accuracy of this automated transcription, some errors in transcription may have occurred.

## 2021-09-25 NOTE — PROGRESS NOTES
[facility]  Office: [address], DO, Ramo Kelli, DO, Alfredo Darby, DO, Lizetteshay Benita Blood, DO, Mikayla Powers MD, Arben Hua MD, Kellie Monk MD, Abdullahi Robles MD, Misha Bojorquez MD, Jesús Gonzalez MD, Broderick Stanley MD, Vivian Pollard, DO, Vamshi Wilson, DO, Veronica Monk MD,  Adrian Dave DO, Gallo Astudillo MD, Tesfaye Hanks MD, Anne-Marie De La Paz MD, Kalani Hamilton MD, , Yu Zavala MD, Jeff To MD, Ashley Miller MD, Sakina Pinedo [facility], [facility], [facility], Lugene Nose, CNP, Adrian Toussaint, CNS, Lynne Shetty, CNP, Alpha Grain, CNP, Peg Loretta, CNP, Kate Florentino, CNP, Dilip Curiel, CNP, Fatuma Champagne PA-C, Minerva Mejia, [facility], Dada Pitts, CNP, Karl Springer, CNP, Asa Austin, CNP, Jose [place], CNP, Bud Brush, CNP, Addison Young, CNP, Severiano Grieves, CNP, [facility], [address] [place] [address]    Progress Note    9/25/2021    3:30 PM    Name:   Magdy Rouse  MRN:     [id number]     Acct:      [de-identified]   Room:   2021/2021-01   Day:  4  Admit Date:  9/21/2021  8:43 PM    PCP:   Shaheen Rosales  Code Status:  Full Code    Subjective:     C/C:   Chief Complaint   Patient presents with   [place] Other     COVID+    Shortness of Breath     Interval History Status: Improved    Patient seen and examined today, no issues overnight. Patient was on 95% FiO2 yesterday, currently on 75% FiO2 today. Patient made some good improvement in oxygen requirement. Discussed that she will be likely here over the weekend and reevaluated on Monday for possible LTAC or home. Brief History:     Magdy Rouse is a 45 y.o. Non- / non  female who presents with Other (COVID+) and Shortness of Breath   and is admitted to the hospital for the management of Covid 19 Pneumonia      Patient presented to the emergency room for the concern of worsening COVID-19 Symptoms.   Patient was originally seen Patient was originally diagnosed with the COVID 4 days prior. Patient states since diagnosis she has been having increased shortness of breath, cough  With water-like diarrhea. Upon arrival to the emergency room patient was noted to be hypoxic with saturations in the 80's  she required 6 lpm nc supplemental oxygen.         Review of Systems:     Constitutional:  negative for chills, fevers, sweats, + fatigue  Respiratory:  +cough, +dyspnea on exertion, +shortness of breath,  No wheezing  Cardiovascular:  negative for chest pain, chest pressure/discomfort, lower extremity edema, palpitations  Gastrointestinal:  negative for abdominal pain, constipation, diarrhea, nausea, vomiting  Neurological:  negative for dizziness, headache    Medications: Allergies:     Allergies   Allergen Reactions    Cephalexin      Gets yeast infection    Codeine        Current Meds:   Scheduled Meds:    insulin glargine  50 Units SubCUTAneous BID    vitamin C  500 mg Oral BID    fluticasone  1 spray Each Nostril Daily    cetirizine  10 mg Oral Daily    insulin lispro  0-18 Units SubCUTAneous TID WC    insulin lispro  0-9 Units SubCUTAneous Nightly    albuterol sulfate HFA  2 puff Inhalation 4x daily    And    ipratropium  2 puff Inhalation 4x daily    famotidine  20 mg Oral Daily    dexamethasone  20 mg IntraVENous Daily    Followed by   Radha Colindres ON 9/27/2021] dexamethasone  10 mg IntraVENous Daily    aspirin  81 mg Oral Daily    ferrous sulfate  325 mg Oral Daily with breakfast    sodium chloride flush  5-40 mL IntraVENous 2 times per day    enoxaparin  30 mg SubCUTAneous BID    Vitamin D  2,000 Units Oral Daily     Continuous Infusions:    dextrose      sodium chloride 25 mL (09/22/21 1822)     PRN Meds: glucose, glucagon (rDNA), dextrose, ALPRAZolam, potassium chloride **OR** potassium alternative oral replacement **OR** potassium chloride, albuterol sulfate HFA, glucose, dextrose, glucagon (rDNA), magnesium sulfate, sodium phosphate IVPB **OR** sodium phosphate IVPB **OR** sodium phosphate IVPB, sodium chloride flush, sodium chloride, ondansetron **OR** ondansetron, magnesium hydroxide, acetaminophen **OR** acetaminophen, dextromethorphan-guaiFENesin    Data:     Past Medical History:   has a past medical history of Anemia, Chronic back pain, Diabetes mellitus ([address] H/O LEEP, Hypertension, and S/P endometrial ablation. Social History:   reports that she has never smoked. She has never used smokeless tobacco. She reports current alcohol use. She reports that she does not use drugs. Family History:   Family History   Problem Relation Age of Onset    Hypotension Mother     Heart Disease Father     Heart Failure Father        Vitals:  /75   Pulse 69   Temp (P) 97.9 °F (36.6 °C) (Axillary)   Resp (!) 33   Ht 5' 3\" (1.6 m)   Wt 270 lb 4.8 oz (122.6 kg)   SpO2 96%   BMI 47.88 kg/m²   Temp (24hrs), Av.1 °F (36.7 °C), Min:97.5 °F (36.4 °C), Max:98.6 °F (37 °C)    Recent Labs     21  1624 21  0839 21  1101   POCGLU 230* 290* 249* 302*       I/O (24Hr):     Intake/Output Summary (Last 24 hours) at 2021 1530  Last data filed at 2021 1330  Gross per 24 hour   Intake 1655 ml   Output 950 ml   Net 705 ml       Labs:  Hematology:  Recent Labs     21  0533 21  0609 21  0542   WBC  --  5.3 4.9   RBC  --  4.95 5.05   HGB  --  14.7 15.0   HCT  --  44.1 48.3*   MCV  --  89.1 95.6   MCH  --  29.7 29.7   MCHC  --  33.3 31.1   RDW  --  13.3 13.4   PLT  --  223 See Reflexed IPF Result   MPV  --  10.8 NOT REPORTED   .3* 47.2*  --      Chemistry:  Recent Labs     21  0031 21  0031 21  0533 21  0609 21  0542      < > 135   < > 134* 136 138   K 4.1   < > 3.9   < > 4.2 4.4 3.9   CL 99   < > 100   < > 104 101 100   CO2 24   < > 20   < > 19* 18* 21   GLUCOSE 358*   < > 294*   < > 203* 341* 272*   BUN 19   < > 22*   < > 21* 24* 26*   CREATININE 0.76   < > 0.69   < > 0.64 0.67 0.59   MG 2.0  --  2.1  --  2.3  --   --    ANIONGAP 12   < > 15   < > 11 17 17   LABGLOM >60   < > >60   < > >60 >60 >60   GFRAA >60   < > >60   < > >60 >60 >60   CALCIUM 8.7   < > 8.4*   < > 8.4* 8.2* 8.1*   PHOS 2.1*  --  1.2*  --  1.7*  --   --    PROBNP  --   --   --   --   --  212  --     < > = values in this interval not displayed. Recent Labs     09/24/21  0723 09/24/21  1102 09/24/21  1624 09/24/21  2029 09/25/21  0839 09/25/21  1101   POCGLU 320* 325* 230* 290* 249* 302*         Radiology:  XR CHEST PORTABLE    Result Date: 9/21/2021  Multifocal airspace opacities worrisome multifocal atypical viral pneumonia. Low lung volumes       Physical Examination:        General appearance:  alert, cooperative, ill-appearing  Mental Status:  oriented to person, place and time and normal affect  Lungs: Tachypneic, diminished posteriorly, low inspiratory effort, on high flow O2 60 L at 95% FiO2  Heart:  regular rate and rhythm, no murmur  Abdomen: obese, soft, nontender, nondistended, normal bowel sounds, no masses, hepatomegaly, splenomegaly  Extremities:  no edema, redness, tenderness in the calves  Skin:  no gross lesions, rashes, induration    Assessment:        Hospital Problems         Last Modified POA    COVID-19 9/24/2021 Yes    Hypertension 9/22/2021 Yes    Diabetes mellitus ([facility] 9/22/2021 Yes    Elevated C-reactive protein (CRP) 9/22/2021 Yes    Sepsis ([facility] 9/22/2021 Yes    Diabetic ketoacidosis without coma associated with type 2 diabetes mellitus ([facility] 9/23/2021 Yes          Plan:        COVID-19 viral pneumonia:   - Infectious disease following.    - Continue isolation precautions. - high flow/BiPAP as warranted. - Actemra received 9/22  - Vitamin C, vitamin D    Acute hypoxic respiratory failure:   - Guarded.     - Patient continues on high flow 95% FiO2 60 L   -  BiPAP at night  - pulmonary following    Sepsis from covid: Secondary to #1    Elevated CRP: improving     Essential hypertension: Currently stable    Diabetes mellitus type 2:   - Increase Lantus to 50 units twice daily  - Blood sugars high but improving since admission    Morbid obesity: Weight loss encouraged    GI/DVT prophylaxis: Pepcid, Lovenox twice daily    Plan:   Increase Lantus to 50 units twice daily   Oxygen as tolerated, reevaluate on Monday for LTAC versus home.     Pulm following, ID following    Wilfrido Castro DO  9/25/2021  3:30 PM

## 2021-09-25 NOTE — PROGRESS NOTES
BRONCHOSPASM/BRONCHOCONSTRICTION     [x]         IMPROVE AERATION/BREATH SOUNDS  [x]   ADMINISTER BRONCHODILATOR THERAPY AS APPROPRIATE  [x]   ASSESS BREATH SOUNDS  [x]   IMPLEMENT AEROSOL/MDI PROTOCOL  [x]   PATIENT EDUCATION AS NEEDED    PROVIDE ADEQUATE OXYGENATION WITH ACCEPTABLE SP02/ABG'S    [x]  IDENTIFY APPROPRIATE OXYGEN THERAPY  [x]   MONITOR SP02/ABG'S AS NEEDED   [x]   PATIENT EDUCATION AS NEEDED    NON INVASIVE VENTILATION  PROVIDE OPTIMAL VENTILATION/ACCEPTABLE SP02  IMPLEMENT NON INVASIVE VENTILATION PROTOCOL  ASSESSMENT SKIN INTEGRITY  PATIENT EDUCATION AS NEEDED  BIPAP AS NEEDED

## 2021-09-26 NOTE — PROGRESS NOTES
OBJECTIVE     PaO2/FiO2 RATIO:  Recent Labs     21  0312   POCPO2 72.5*      FiO2 : 95 %     VITAL SIGNS:   LAST:  /83   Pulse 86   Temp 97.5 °F (36.4 °C) (Axillary)   Resp 26   Ht 5' 3\" (1.6 m)   Wt 270 lb 4.8 oz (122.6 kg)   SpO2 (!) 89%   BMI 47.88 kg/m²   8-24 HR RANGE:  TEMP Temp  Av °F (36.7 °C)  Min: 97.5 °F (36.4 °C)  Max: 98.7 °F (93.8 °C)   BP Systolic (83XML), KUH:274 , Min:93 , AOK:886      Diastolic (89ECF), PEC:75, Min:59, Max:84     PULSE Pulse  Av.7  Min: 62  Max: 86   RR Resp  Av.2  Min: 22  Max: 32   O2 SAT SpO2  Av %  Min: 86 %  Max: 92 %   OXYGEN DELIVERY O2 Flow Rate (L/min)  Av L/min  Min: 60 L/min  Max: 60 L/min        SYSTEMIC EXAMINATION:   Physical Exam -  General appearance - Ill-appearing, mild to moderate respiratory distress  Mental status - awake & alert, follows commands  Eyes - pupils equal and reactive, sclera anicteric  Mouth - mucous membranes moist, pharynx normal without lesions. Large tongue Mallampati 2  Neck -Short thick neck supple, no significant adenopathy, carotids upstroke normal bilaterally, no bruits  Chest - There is no intercostal recession or use of accessory muscles. Breath sounds bilaterally were dimnished to auscultation at bases. There were mild crackles present at bases. Heart - normal rate, regular rhythm, normal S1, S2, no murmurs, rubs, clicks or gallops  Abdomen - soft, nontender, nondistended, no masses or organomegaly  Neurological - non-focal  Extremities - peripheral pulses normal, mild bilateral pedal edema, no clubbing or cyanosis.   No calf tenderness  Skin - normal coloration and turgor, no rashes, no suspicious skin lesions noted     DATA REVIEW     Medications: Current Inpatient  Scheduled Meds:   insulin glargine  55 Units SubCUTAneous BID    vitamin C  500 mg Oral BID    fluticasone  1 spray Each Nostril Daily    cetirizine  10 mg Oral Daily    insulin lispro  0-18 Units SubCUTAneous TID WC    insulin lispro  0-9 Units SubCUTAneous Nightly    albuterol sulfate HFA  2 puff Inhalation 4x daily    And    ipratropium  2 puff Inhalation 4x daily    famotidine  20 mg Oral Daily    dexamethasone  20 mg IntraVENous Daily    Followed by   Maurisio Barrett ON 9/27/2021] dexamethasone  10 mg IntraVENous Daily    aspirin  81 mg Oral Daily    ferrous sulfate  325 mg Oral Daily with breakfast    sodium chloride flush  5-40 mL IntraVENous 2 times per day    enoxaparin  30 mg SubCUTAneous BID    Vitamin D  2,000 Units Oral Daily     Continuous Infusions:   dextrose      sodium chloride 25 mL (09/22/21 1822)       INPUT/OUTPUT:  In: 225 [P.O.:225]  Out: 1050 [Urine:1050]  Date 09/26/21 0000 - 09/26/21 2359   Shift 9450-0210 3500-4056 3875-1861 24 Hour Total   INTAKE   P.O.(mL/kg/hr) 225(0.2)   225   Shift Total(mL/kg) 225(1.8)   225(1.8)   OUTPUT   Urine(mL/kg/hr)  1050  1050   Shift Total(mL/kg)  1050(8.6)  1050(8.6)   Weight (kg) 122.6 122.6 122.6 122.6        LABS:  ABGs:   Recent Labs     09/24/21  0312   POCPH 7.356   POCPCO2 50.1*   POCPO2 72.5*   POCHCO3 28.1*   LIAB9NPS 93*     CBC:   Recent Labs     09/24/21  0609 09/25/21  0542 09/26/21  0517   WBC 5.3 4.9 6.0   HGB 14.7 15.0 14.9   HCT 44.1 48.3* 43.5   MCV 89.1 95.6 85.0    See Reflexed IPF Result 238   RBC 4.95 5.05 5.12*   MCH 29.7 29.7 29.1   MCHC 33.3 31.1 34.3   RDW 13.3 13.4 12.8     CRP:   Recent Labs     09/24/21  0609   CRP 47.2*     LDH:   No results for input(s): LDH in the last 72 hours. BMP:   Recent Labs     09/24/21  0609 09/25/21  0542 09/26/21  0517    138 132*   K 4.4 3.9 3.9    100 96*   CO2 18* 21 20   BUN 24* 26* 29*   CREATININE 0.67 0.59 0.68   GLUCOSE 341* 272* 329*     Liver Function Test:   No results for input(s): PROT, LABALBU, ALT, AST, GGT, ALKPHOS, BILITOT in the last 72 hours. Coagulation Profile:   No results for input(s): INR, PROTIME, APTT in the last 72 hours.   D-Dimer:  No results for input(s): DDIMER in the last 72 hours. Ferritin:    No results for input(s): FERRITIN in the last 72 hours. Lactic Acid:  No results for input(s): LACTA in the last 72 hours. Cardiac Enzymes:  No results for input(s): CKTOTAL, CKMB, CKMBINDEX, TROPONINI in the last 72 hours. Invalid input(s): TROPONIN, HSTROP  BNP/ProBNP:   Recent Labs     09/24/21  0609   PROBNP 212     Triglycerides:  No results for input(s): TRIG in the last 72 hours. Microbiology:  Urine Culture:  No components found for: CURINE  Blood Culture:  No components found for: CBLOOD, CFUNGUSBL  Sputum Culture:  No components found for: CSPUTUM  No results for input(s): SPECDESC, SPECIAL, CULTURE, STATUS, ORG, CDIFFTOXPCR, CAMPYLOBPCR, SALMONELLAPC, SHIGAPCR, SHIGELLAPCR, MPNEUG, MPNEUM, LACTOQL in the last 72 hours. No results for input(s): SPUTUM, SPECDESC, SPECIAL, CULTURE, STATUS, ORG, CDIFFTOXPCR, MPNEUM, MPNEUG in the last 72 hours. Invalid input(s): CURINE, CBLOOD, CFUNGUSBL     Pathology:    Radiology Reports:  XR CHEST (SINGLE VIEW FRONTAL)   Final Result   Bilateral diffuse pulmonary edema and or pneumonia without definite effusion. No cardiomegaly was identified. Slight improvement bilaterally has occurred from 09/21/2021. XR CHEST PORTABLE   Final Result   Multifocal airspace opacities worrisome multifocal atypical viral pneumonia. Low lung volumes              Echocardiogram:   No results found for this or any previous visit. ASSESSMENT AND PLAN     Assessment:    // Acute hypoxic respiratory failure  // Acute respiratory distress syndrome  // Bilateral multifocal pneumonia due to COVID 19 infection  // Diabetes mellitus.  // Morbid obesity  // Likely obstructive sleep apnea/hypoventilation  // Hypertension  // E. coli in urine    Plan:    I personally interviewed/examined the patient; reviewed interval history, interpreted all available radiographic and laboratory data at the time of service.     - Currently saturating appropriately on HFNC 60L at 95%  - Recommend using BiPAP overnight and intermittently throughout the day along with high flow. - Encourage prone positioning when sleeping.  - Daily CRP: 9/21 - 205, 128, 47  - Tested positive:  9/17/21  - Symptom onset:  9/16/21  - Out of Isolation: No  - Vaccinated: No  - Remdesivir: 9/22  - Decadron: 9/21, 9/22, 9/23, 9/24, 9/25  - Actemra: 9/22/21  - Co-infection: No. Asymptomatic bacteruria E Coli in urine.  - Fluid Balance: +800ml  - Glycemic Control: Per primary team. Lantus increased to 55U BiD    Patient need to be monitored closely as she is on high flow nasal cannula 90% and on BiPAP and remain tachypneic but there is no increased work of breathing or worsening hypoxia at this time. Discussed with respiratory therapist treatment plan discussed with  Discussed with nursing staff. Jose Nunn MD  PGY-3, Internal Medicine Resident  [address]         9/26/2021, 2:15 PM     Please note that this chart was generated using voice recognition Dragon dictation software. Although every effort was made to ensure the accuracy of this automated transcription, some errors in transcription may have occurred. Attending Physician Statement  I have discussed the care of Kemal Walker, including pertinent history and exam findings with the resident. I have reviewed the key elements of all parts of the encounter with the resident. I have seen and examined the patient with the resident. I agree with the assessment and plan and status of the problem list as documented. I seen the patient during my round today, chart reviewed overnight events noted. She is afebrile overnight T-max is 98.7. She remained hemodynamically stable no hypotension. Her tachypnea is gradually better there was no reported respiratory rate of greater than 30s last night.   She remains on high flow nasal cannula and she is on 95% and 60 L most of the time she is saturating 88 to 90% and above. She did use BiPAP overnight. BiPAP setting reviewed and she was on 18/10/60 percent on BiPAP. According to patient when I saw her she is feeling better her appetite is improving her shortness of breath she think that it is better and she was able to move out of bed to bedside commode. She was noted to have desaturation when she move from bed but recovered quickly. We will continue with high flow nasal cannula and as tolerated we will try to decrease FiO2. Discussed with patient to continue with BiPAP every night and as needed during the daytime we will alternate BiPAP with high flow nasal cannula during the daytime. Blood sugar remain elevated  Continue with Atrovent and albuterol  Continue with DVT prophylaxis with Lovenox 30 mg twice daily. She is currently on dexamethasone 20 mg for 5 days then will be 10 mg after that daily. We will continue to follow closely as she continues require high FiO2 on high flow nasal cannula and BiPAP. Consider low-dose intermittent Lasix. Discussed with nursing staff, treatment and plan discussed. Discussed with respiratory therapist.         Please note that this chart was generated using voice recognition Dragon dictation software. Although every effort was made to ensure the accuracy of this automated transcription, some errors in transcription may have occurred.      Archana Merida MD  9/26/2021 5:03 PM

## 2021-09-26 NOTE — PROGRESS NOTES
[facility]  Progress Note    Ervin Umaña  MRN: [id number]  Date: 9/26/2021  LOS: 5     Reason for F/U :   COVID-19 virus pneumonia    Impression :   COVID-19 virus infection tested + 9/17/2021  Acute hypoxic respiratory failure  Elevated inflammatory marker with CRP greater than 200  Responded well to medical treatment  Asymptomatic bacteriuria with E. coli isolated in the urine  Morbid obesity  Diabetes mellitus type 2    Recommendations:   Continue high-dose Decadron and is on 20 mg IV daily then will taper down to 10 mg IV daily for 5 days on 9/27/2021  The patient received a dose of Actemra 9/22/2021  Patient continues on high flow O2 by nasal cannula at 50 L and 90%. Clinically the patient does look better and is less toxic and the inflammatory markers have trended down  We will continue supportive therapy and follow her progress    Infection Control Recommendations:   Droplet plus precautions    Discharge Planning:   Patient will need Midline Catheter Insertion/ PICC line Insertion: No  Patient will need: Home IV , [place],  SNF,  LTAC: Undetermined  Patient willneed outpatient wound care: No    Medical Decision making / Summary of Stay:   Ervin Umaña is a 45y.o.-year-old female who was initially admitted on 9/21/2021. Dai Mims is an unvaccinated 52-year-old woman with a history of diabetes mellitus type 2, hypertension, chronic back pain, anemia and morbid obesity among other medical problems. She reports that on Tuesday, September 13, 2021 she started having symptoms with a cough, headache, generalized malaise. She does not report any subjective fevers, chills or sweats but subsequently started to develop some shortness of breath as well as some diarrhea. The diarrhea though she could not quite say if it was related to this illness as she does have irritable bowel syndrome and intermittently does have diarrhea.   The patient did subsequently have COVID-19 testing done on 2021 and was found to be Covid positive. By Saturday she reports that she was having significant episodes of shortness of breath which prompted her to come into the emergency department for evaluation.     In the ED he was noted to be hypoxic with saturations in the 80s and was started on 6 L of oxygen by nasal cannula and due to worsening hypoxia the patient was placed on high flow O2 by nasal cannula at 40 L and 100% and was also placed on a nonrebreather. The BiPAP was also ordered and the patient was seen by the pulmonology service earlier today. The patient had initially been started on remdesivir and Decadron and the Decadron dose was increased to high-dose Decadron today. The patient was given Actemra 2021    CRP is continued to trend down and the patient has responded well to therapy. Chest x-ray 2021 showed improved airspace opacity      Current evaluation:2021    /83   Pulse 86   Temp 97.5 °F (36.4 °C) (Axillary)   Resp 26   Ht 5' 3\" (1.6 m)   Wt 270 lb 4.8 oz (122.6 kg)   SpO2 (!) 89%   BMI 47.88 kg/m²     Temperature Range: Temp: 97.5 °F (36.4 °C) Temp  Av °F (36.7 °C)  Min: 97.5 °F (36.4 °C)  Max: 98.7 °F (37.1 °C)  The patient is seen and evaluated at bedside she is awake and alert in no acute distress. She is on high flow O2 by nasal cannula at 50 L and 90%. She has not had any subjective fevers or chills pain  Appetite has been good and she is eating well. She continues to have some cough as well as some shortness of breath but she also subjectively feels better    Review of Systems   Constitutional: Negative. Respiratory: Positive for shortness of breath. Cardiovascular: Negative. Gastrointestinal: Negative. Genitourinary: Negative. Musculoskeletal: Negative. Skin: Negative. Neurological: Negative. Psychiatric/Behavioral: Negative.         Physical Examination :     Physical Exam  Constitutional: Appearance: She is well-developed. She is obese. HENT:      Head: Normocephalic and atraumatic. Cardiovascular:      Rate and Rhythm: Regular rhythm. Heart sounds: Normal heart sounds. Pulmonary:      Breath sounds: Rales present. Abdominal:      General: Bowel sounds are normal.      Palpations: Abdomen is soft. Musculoskeletal:      Cervical back: Neck supple. Skin:     General: Skin is warm and dry. Neurological:      Mental Status: She is alert and oriented to person, place, and time. Laboratory data:   I have independently reviewed the followinglabs:  CBC with Differential:   Recent Labs     09/25/21 0542 09/26/21 0517   WBC 4.9 6.0   HGB 15.0 14.9   HCT 48.3* 43.5   PLT See Reflexed IPF Result 238     BMP:   Recent Labs     09/25/21 0542 09/26/21 0517    132*   K 3.9 3.9    96*   CO2 21 20   BUN 26* 29*   CREATININE 0.59 0.68     Hepatic Function Panel:   No results for input(s): PROT, LABALBU, BILIDIR, IBILI, BILITOT, ALKPHOS, ALT, AST in the last 72 hours. Lab Results   Component Value Date    PROCAL 0.09 09/24/2021    PROCAL 0.13 09/21/2021     Lab Results   Component Value Date    CRP 47.2 09/24/2021    .3 09/23/2021    .8 09/22/2021     No results found for: [place]      Lab Results   Component Value Date    DDIMER 0.54 09/21/2021     Lab Results   Component Value Date    FERRITIN 706 09/22/2021    FERRITIN 756 09/21/2021     No results found for: LDH  Lab Results   Component Value Date    FIBRINOGEN 583 09/21/2021       Results in Past 30 Days  Result Component Current Result Ref Range Previous Result Ref Range   SARS-CoV-2, Rapid DETECTED (A) (9/21/2021) Not Detected Not in Time Range      Lab Results   Component Value Date    COVID19 DETECTED 09/21/2021       [facility] results [facility] input(s): [facility] in the last 72 hours.     Imaging Studies:   ONE XRAY VIEW OF THE CHEST 9/24/2021 7:05 pm  Impression   Bilateral diffuse pulmonary edema and or pneumonia without definite effusion. No cardiomegaly was identified.       Slight improvement bilaterally has occurred from 09/21/2021. Cultures:     Culture, Urine [[id number]] (Abnormal) Collected: 09/22/21 1925   Order Status: Completed Specimen: Urine Random Updated: 09/23/21 1436    Specimen Description . Random Urine    Special Requests NOT REPORTED    Culture ESCHERICHIA COLI 50 to 100,000 CFU/MLAbnormal    Escherichia coli (1)    Antibiotic Interpretation DEBBY Status    amikacin  NOT REPORTED Final    ampicillin Sensitive 4 Final    ampicillin-sulbactam  NOT REPORTED Final    aztreonam Sensitive <=1 Final    ceFAZolin Sensitive <=4 Final    ceFAZolin Sensitive Cefazolin sensitivity results can be used to predict the effectiveness of oral cephalosporins (eg. Cephalexin) in uncomplicated Urinary Tract Infections due to E. coli, K. pneumoniae, and P. mirabilis Final    cefepime  NOT REPORTED Final    cefTRIAXone Sensitive <=1 Final    ciprofloxacin Sensitive <=0.25 Final    ertapenem  NOT REPORTED Final    Confirmatory Extended Spectrum Beta-Lactamase Negative NEGATIVE Final    gentamicin Sensitive <=1 Final    meropenem  NOT REPORTED Final    nitrofurantoin Sensitive <=16 Final    tigecycline  NOT REPORTED Final    tobramycin Sensitive <=1 Final    trimethoprim-sulfamethoxazole Sensitive <=20 Final    piperacillin-tazobactam Sensitive <=4 Final        COVID-19, Rapid [[id number]] (Abnormal) Collected: 09/21/21 2123   Order Status: Completed Specimen: Nasopharyngeal Swab Updated: 09/21/21 2154    Specimen Description . NASOPHARYNGEAL SWAB    SARS-CoV-2, Rapid DETECTEDAbnormal     Comment:        Rapid NAAT: The specimen is POSITIVE for SARS-Cov-2, the novel coronavirus associated with   COVID-19.         This test has been authorized by the FDA under an Emergency Use Authorization (EUA) for use   by authorized laboratories.         The ID NOW COVID-19 assay is designed to detect the virus that causes COVID-19 in patients   with signs and symptoms of infection who are suspected of COVID-19. An individual without symptoms of COVID-19 and who is not shedding SARS-CoV-2 virus would   expect to have a negative (not detected) result in this assay. Fact sheet for Healthcare Providers: Ping   Fact sheet for Patients: Ping           Methodology: Isothermal Nucleic Acid Amplification         Results reported to the appropriate Health Department               Medications:      insulin glargine  50 Units SubCUTAneous BID    vitamin C  500 mg Oral BID    fluticasone  1 spray Each Nostril Daily    cetirizine  10 mg Oral Daily    insulin lispro  0-18 Units SubCUTAneous TID WC    insulin lispro  0-9 Units SubCUTAneous Nightly    albuterol sulfate HFA  2 puff Inhalation 4x daily    And    ipratropium  2 puff Inhalation 4x daily    famotidine  20 mg Oral Daily    dexamethasone  20 mg IntraVENous Daily    Followed by   Mahnaz Babcock ON 9/27/2021] dexamethasone  10 mg IntraVENous Daily    aspirin  81 mg Oral Daily    ferrous sulfate  325 mg Oral Daily with breakfast    sodium chloride flush  5-40 mL IntraVENous 2 times per day    enoxaparin  30 mg SubCUTAneous BID    Vitamin D  2,000 Units Oral Daily           [facility]  Jonnathan Singleton MD  [employer]  OFFICE: [phone]      Electronically signed by Jonnathan Singleton MD on 9/26/2021 at 1:01 PM  Thank you for allowing us to participate in the care of this patient. Please call with questions. This note iscreated with the assistance of a speech recognition program.  While intending to generate a document that actually reflects the content of the visit, the document can still have some errors including those of syntax andsound a like substitutions which may escape proof reading.   In such instances, actual meaning can be extrapolated by contextual diversion.

## 2021-09-26 NOTE — PROGRESS NOTES
[facility]  Office: [address], DO, Yas Mackay, DO, Susan Gunderson, DO, Ricco Lopez, DO, Vannessa Guidry MD, Matt Bullard MD, Rosa Sutton MD, Lisa Samaniego MD, Malika Rodriguez MD, Nevaeh Barber MD, Virgen Coates MD, Anny Garcia, DO, Hussein Jesus, DO, Delmi Gonsalez MD,  Clifford Robbins, DO, Alphonse Bejarano MD, Anahi David MD, Odette Larry MD, Adan Jung MD, , Cyndy Castleman, MD, Elmer Rollins MD, Rosa Rae MD, César Jennings, [facility], [facility], [facility], Lynda Henry, CNP, Sherice Mosquera, CNS, Ermias Gay, CNP, Abdirashid Ayon, CNP, Brook Fitzgerald, CNP, Dionicia Frankel, [facility], Giovanni Vo, CNP, Lucero Ordonez PA-C, Alex Mehta, [facility], Quentin Haddad, CNP, Tera Gaspar, CNP, Prasanna Delacruz, CNP, Broderick Esparza, CNP, Sakina Shepard, CNP, Declan Quezada, CNP, Carmen Guo, CNP, Delma Teague, [address] [place] [address]    Progress Note    9/26/2021    1:10 PM    Name:   Julius Vega  MRN:     [id number]     Acct:      [de-identified]   Room:   2021/2021-01  IP Day:  5  Admit Date:  9/21/2021  8:43 PM    PCP:   Eulogio Carbone  Code Status:  Full Code    Subjective:     C/C:   Chief Complaint   Patient presents with   [facility] Other     COVID+    Shortness of Breath     Interval History Status: Improved    Patient seen and examined. No issues overnight. She was saturating 99% on FIo2 95%. She felt well today. Discussed discharge with her. Brief History:     Julius Vega is a 45 y.o. Non- / non  female who presents with Other (COVID+) and Shortness of Breath   and is admitted to the hospital for the management of Covid 19 Pneumonia      Patient presented to the emergency room for the concern of worsening COVID-19 Symptoms. Patient was originally seen Patient was originally diagnosed with the COVID 4 days prior.   Patient states since diagnosis she has been having increased shortness of breath, cough  With water-like diarrhea. Upon arrival to the emergency room patient was noted to be hypoxic with saturations in the 80's  she required 6 lpm nc supplemental oxygen.         Review of Systems:     Constitutional:  negative for chills, fevers, sweats, + fatigue  Respiratory:  +cough, +dyspnea on exertion, +shortness of breath,  No wheezing  Cardiovascular:  negative for chest pain, chest pressure/discomfort, lower extremity edema, palpitations  Gastrointestinal:  negative for abdominal pain, constipation, diarrhea, nausea, vomiting  Neurological:  negative for dizziness, headache    Medications: Allergies:     Allergies   Allergen Reactions    Cephalexin      Gets yeast infection    Codeine        Current Meds:   Scheduled Meds:    insulin glargine  50 Units SubCUTAneous BID    vitamin C  500 mg Oral BID    fluticasone  1 spray Each Nostril Daily    cetirizine  10 mg Oral Daily    insulin lispro  0-18 Units SubCUTAneous TID WC    insulin lispro  0-9 Units SubCUTAneous Nightly    albuterol sulfate HFA  2 puff Inhalation 4x daily    And    ipratropium  2 puff Inhalation 4x daily    famotidine  20 mg Oral Daily    dexamethasone  20 mg IntraVENous Daily    Followed by   Gonzalo Velasquez ON 9/27/2021] dexamethasone  10 mg IntraVENous Daily    aspirin  81 mg Oral Daily    ferrous sulfate  325 mg Oral Daily with breakfast    sodium chloride flush  5-40 mL IntraVENous 2 times per day    enoxaparin  30 mg SubCUTAneous BID    Vitamin D  2,000 Units Oral Daily     Continuous Infusions:    dextrose      sodium chloride 25 mL (09/22/21 1822)     PRN Meds: glucose, glucagon (rDNA), dextrose, ALPRAZolam, potassium chloride **OR** potassium alternative oral replacement **OR** potassium chloride, albuterol sulfate HFA, glucose, dextrose, glucagon (rDNA), magnesium sulfate, sodium phosphate IVPB **OR** sodium phosphate IVPB **OR** sodium phosphate IVPB, sodium chloride flush, sodium chloride, ondansetron **OR** ondansetron, magnesium hydroxide, acetaminophen **OR** acetaminophen, dextromethorphan-guaiFENesin    Data:     Past Medical History:   has a past medical history of Anemia, Chronic back pain, Diabetes mellitus ([address] H/O LEEP, Hypertension, and S/P endometrial ablation. Social History:   reports that she has never smoked. She has never used smokeless tobacco. She reports current alcohol use. She reports that she does not use drugs. Family History:   Family History   Problem Relation Age of Onset    Hypotension Mother     Heart Disease Father     Heart Failure Father        Vitals:  /83   Pulse 86   Temp 97.5 °F (36.4 °C) (Axillary)   Resp 26   Ht 5' 3\" (1.6 m)   Wt 270 lb 4.8 oz (122.6 kg)   SpO2 (!) 89%   BMI 47.88 kg/m²   Temp (24hrs), Av °F (36.7 °C), Min:97.5 °F (36.4 °C), Max:98.7 °F (37.1 °C)    Recent Labs     21  1621 21  2105 21  0755 21  1105   POCGLU 212* 257* 289* 260*       I/O (24Hr):     Intake/Output Summary (Last 24 hours) at 2021 1310  Last data filed at 2021 1240  Gross per 24 hour   Intake 585 ml   Output 1050 ml   Net -465 ml       Labs:  Hematology:  Recent Labs     21  0609 21  0542 21  0517   WBC 5.3 4.9 6.0   RBC 4.95 5.05 5.12*   HGB 14.7 15.0 14.9   HCT 44.1 48.3* 43.5   MCV 89.1 95.6 85.0   MCH 29.7 29.7 29.1   MCHC 33.3 31.1 34.3   RDW 13.3 13.4 12.8    See Reflexed IPF Result 238   MPV 10.8 NOT REPORTED 10.2   CRP 47.2*  --   --      Chemistry:  Recent Labs     21  0609 21  0542 21  0517    138 132*   K 4.4 3.9 3.9    100 96*   CO2 18* 21 20   GLUCOSE 341* 272* 329*   BUN 24* 26* 29*   CREATININE 0.67 0.59 0.68   ANIONGAP 17 17 16   LABGLOM >60 >60 >60   GFRAA >60 >60 >60   CALCIUM 8.2* 8.1* 9.0   PROBNP 212  --   --      Recent Labs     21  0839 21  1101 21  1621 21  2105 21  0755 21  1105   POCGLU 249* 302* 212* 257* 289* 260* Radiology:  XR CHEST PORTABLE    Result Date: 9/21/2021  Multifocal airspace opacities worrisome multifocal atypical viral pneumonia. Low lung volumes       Physical Examination:        General appearance:  alert, cooperative, ill-appearing  Mental Status:  oriented to person, place and time and normal affect  Lungs: Tachypneic, diminished posteriorly, low inspiratory effort, on high flow O2 60 L at 95% FiO2  Heart:  regular rate and rhythm, no murmur  Abdomen: obese, soft, nontender, nondistended, normal bowel sounds, no masses, hepatomegaly, splenomegaly  Extremities:  no edema, redness, tenderness in the calves  Skin:  no gross lesions, rashes, induration    Assessment:        Hospital Problems         Last Modified POA    COVID-19 9/24/2021 Yes    Hypertension 9/22/2021 Yes    Diabetes mellitus ([facility] 9/22/2021 Yes    Elevated C-reactive protein (CRP) 9/22/2021 Yes    Sepsis ([facility] 9/22/2021 Yes    Diabetic ketoacidosis without coma associated with type 2 diabetes mellitus ([facility] 9/23/2021 Yes          Plan:        COVID-19 viral pneumonia:   - Infectious disease following.    - Continue isolation precautions. - high flow/BiPAP as warranted. - Actemra received 9/22  - Vitamin C, vitamin D    Acute hypoxic respiratory failure:   - Guarded. - Patient continues on high flow 95% FiO2 60 L   -  BiPAP at night  - pulmonary following    Sepsis from covid: Secondary to #1    Elevated CRP: improving     Essential hypertension: Currently stable    Diabetes mellitus type 2:   - Increase Lantus to 50 units twice daily  - Blood sugars high but improving since admission    Morbid obesity: Weight loss encouraged    GI/DVT prophylaxis: Pepcid, Lovenox twice daily    Plan:   Increase Lantus to 55 units twice daily   Oxygen as tolerated, reevaluate on Monday for LTAC versus home.     Pulm following, ID following   Wean oxygen to SPO2 >90%    Eduard Vidales DO  9/26/2021  1:10 PM

## 2021-09-27 NOTE — PROGRESS NOTES
[facility]  Office: [address], DO, Rachel Montano, DO, Billy Atkins, DO, Berry Bello Jessica, DO, Awais Willams MD, Winter Solis MD, Haley Masterson MD, Syl Gutierrez MD, Zoe Bond MD, Sandra Schaefer MD, Davina Manning MD, Moiz Galloway, DO, Leobardo Boyle DO, Doyle Clement MD,  Tha Whitney DO, Donna Fernando MD, Inés Branham MD, Macie Gomez MD, Karen Nicole MD, , Joli Angelucci, MD, Annette Espinal MD, Joey Stephen MD, Constanza Hamilton, [facility], [facility], CNP, Martinez Mustafa, CNP, Marky Gutiérrez, [facility], Yifan Arredondo, CNP, Kate Casanova, CNP, Fernando Parrish, CNP, Tara Mcnamara, CNP, Yaw Barbosa, CNP, SH[facility], Nathalie Thompson, [facility], Charu Balderrama, CNP, Alicia Nails, CNP, Kim Sear, CNP, Ed Cuello, CNP, Ruby Ann, CNP, Taina Daniels, CNP, Baylee Lewis, CNP, Casey Proper, [address] [place] [address]    Progress Note    9/27/2021    4:45 PM    Name:   Kenia Hawknis  MRN:     [id number]     Acct:      [de-identified]   Room:   2004/2004-01  IP Day:  6  Admit Date:  9/21/2021  8:43 PM    PCP:   Sourav Andrews  Code Status:  Full Code    Subjective:     C/C:   Chief Complaint   Patient presents with   [facility] Other     COVID+    Shortness of Breath     Interval History Status: Improved    Seen and examined, no shortness of breath however still on high FiO2 requirements. Patient underway for LTAC referral    Brief History:     Kenia Hawkins is a 45 y.o. Non- / non  female who presents with Other (COVID+) and Shortness of Breath   and is admitted to the hospital for the management of Covid 19 Pneumonia      Patient presented to the emergency room for the concern of worsening COVID-19 Symptoms. Patient was originally seen Patient was originally diagnosed with the COVID 4 days prior.   Patient states since diagnosis she has been having increased shortness of breath, cough  With water-like diarrhea. Upon arrival to the emergency room patient was noted to be hypoxic with saturations in the 80's  she required 6 lpm nc supplemental oxygen. Was seen by infectious disease, started on high-dose steroids, Actemra was given on 9/22/2021. Patient however continue to require more oxygen. She has been feeling fine for the past couple days but still requires 90% FiO2 to maintain oxygen of 91 to 90%. She still feels very winded with ambulation.        Review of Systems:     Constitutional:  negative for chills, fevers, sweats, + fatigue  Respiratory:  +cough, +dyspnea on exertion, +shortness of breath,  No wheezing  Cardiovascular:  negative for chest pain, chest pressure/discomfort, lower extremity edema, palpitations  Gastrointestinal:  negative for abdominal pain, constipation, diarrhea, nausea, vomiting  Neurological:  negative for dizziness, headache    Medications: Allergies:     Allergies   Allergen Reactions    Cephalexin      Gets yeast infection    Codeine        Current Meds:   Scheduled Meds:    insulin glargine  55 Units SubCUTAneous BID    vitamin C  500 mg Oral BID    fluticasone  1 spray Each Nostril Daily    cetirizine  10 mg Oral Daily    insulin lispro  0-18 Units SubCUTAneous TID WC    insulin lispro  0-9 Units SubCUTAneous Nightly    albuterol sulfate HFA  2 puff Inhalation 4x daily    And    ipratropium  2 puff Inhalation 4x daily    famotidine  20 mg Oral Daily    dexamethasone  10 mg IntraVENous Daily    aspirin  81 mg Oral Daily    ferrous sulfate  325 mg Oral Daily with breakfast    sodium chloride flush  5-40 mL IntraVENous 2 times per day    enoxaparin  30 mg SubCUTAneous BID    Vitamin D  2,000 Units Oral Daily     Continuous Infusions:    dextrose      sodium chloride 25 mL (09/22/21 1822)     PRN Meds: glucose, glucagon (rDNA), dextrose, ALPRAZolam, potassium chloride **OR** potassium alternative oral replacement **OR** potassium chloride, albuterol sulfate HFA, glucose, dextrose, glucagon (rDNA), magnesium sulfate, sodium phosphate IVPB **OR** sodium phosphate IVPB **OR** sodium phosphate IVPB, sodium chloride flush, sodium chloride, ondansetron **OR** ondansetron, magnesium hydroxide, acetaminophen **OR** acetaminophen, dextromethorphan-guaiFENesin    Data:     Past Medical History:   has a past medical history of Anemia, Chronic back pain, Diabetes mellitus ([address] H/O LEEP, Hypertension, and S/P endometrial ablation. Social History:   reports that she has never smoked. She has never used smokeless tobacco. She reports current alcohol use. She reports that she does not use drugs. Family History:   Family History   Problem Relation Age of Onset    Hypotension Mother     Heart Disease Father     Heart Failure Father        Vitals:  BP (!) 136/91   Pulse 63   Temp 97.9 °F (36.6 °C) (Oral)   Resp (!) 33   Ht 5' 3\" (1.6 m)   Wt 270 lb 4.8 oz (122.6 kg)   SpO2 (!) 89%   BMI 47.88 kg/m²   Temp (24hrs), Av.9 °F (36.6 °C), Min:97.8 °F (36.6 °C), Max:98 °F (36.7 °C)    Recent Labs     21  1105 21  1758 21  0819 21  1253   POCGLU 260* 174* 253* 249*       I/O (24Hr):     Intake/Output Summary (Last 24 hours) at 2021 1645  Last data filed at 2021 1559  Gross per 24 hour   Intake    Output 300 ml   Net -300 ml       Labs:  Hematology:  Recent Labs     21  0542 21  0517   WBC 4.9 6.0   RBC 5.05 5.12*   HGB 15.0 14.9   HCT 48.3* 43.5   MCV 95.6 85.0   MCH 29.7 29.1   MCHC 31.1 34.3   RDW 13.4 12.8   PLT See Reflexed IPF Result 238   MPV NOT REPORTED 10.2     Chemistry:  Recent Labs     21  0542 21  0517    132*   K 3.9 3.9    96*   CO2 21 20   GLUCOSE 272* 329*   BUN 26* 29*   CREATININE 0.59 0.68   ANIONGAP 17 16   LABGLOM >60 >60   GFRAA >60 >60   CALCIUM 8.1* 9.0     Recent Labs     21  2105 21  0755 21  1105 21  1758 21  0819 21  1253 Juve*         Radiology:  XR CHEST PORTABLE    Result Date: 9/21/2021  Multifocal airspace opacities worrisome multifocal atypical viral pneumonia. Low lung volumes       Physical Examination:        General appearance:  alert, cooperative, ill-appearing  Mental Status:  oriented to person, place and time and normal affect  Lungs: Tachypneic, diminished posteriorly, low inspiratory effort, on high flow O2 60 L at 95% FiO2  Heart:  regular rate and rhythm, no murmur  Abdomen: obese, soft, nontender, nondistended, normal bowel sounds, no masses, hepatomegaly, splenomegaly  Extremities:  no edema, redness, tenderness in the calves  Skin:  no gross lesions, rashes, induration    Assessment:        Hospital Problems         Last Modified POA    COVID-19 9/24/2021 Yes    Hypertension 9/22/2021 Yes    Diabetes mellitus ([facility] 9/22/2021 Yes    Elevated C-reactive protein (CRP) 9/22/2021 Yes    Sepsis ([facility] 9/22/2021 Yes    Diabetic ketoacidosis without coma associated with type 2 diabetes mellitus ([facility] 9/23/2021 Yes          Plan:        COVID-19 viral pneumonia:   - Infectious disease following.    - Continue isolation precautions. - high flow/BiPAP as warranted. - Actemra received 9/22  - Vitamin C, vitamin D    Acute hypoxic respiratory failure:   - Guarded. - Patient continues on high flow 95% FiO2 60 L   -  BiPAP at night  - pulmonary following    Sepsis from covid: Secondary to #1    Essential hypertension: Currently stable    Diabetes mellitus type 2:   - Increase Lantus to 50 units twice daily  - Blood sugars high but improving since admission    Morbid obesity: Weight loss encouraged    GI/DVT prophylaxis: Pepcid, Lovenox twice daily    Plan:  Continue Lantus 50 units twice daily, nighttime dose was not given.   Will not increase for now  LTAC referral  FiO2 has been unchanged for the past few days, if we are able to decrease to 70% can discharge to [facility] whenever they are kali Harp DO  9/27/2021  4:45 PM

## 2021-09-27 NOTE — PLAN OF CARE
Problem: Airway Clearance - Ineffective  Goal: Achieve or maintain patent airway  9/27/2021 0002 by Jessie Smallwood TREY  Outcome: Ongoing     Problem: Gas Exchange - Impaired  Goal: Absence of hypoxia  9/27/2021 0002 by Jessie Smallwood TREY  Outcome: Ongoing     Problem: Gas Exchange - Impaired  Goal: Promote optimal lung function  9/27/2021 0002 by Jessie Smallwood TREY  Outcome: Ongoing     Problem: Breathing Pattern - Ineffective  Goal: Ability to achieve and maintain a regular respiratory rate  9/27/2021 0002 by Jessie Smallwood TREY  Outcome: Ongoing   BRONCHOSPASM/BRONCHOCONSTRICTION     [x]         IMPROVE AERATION/BREATH SOUNDS  [x]   ADMINISTER BRONCHODILATOR THERAPY AS APPROPRIATE  [x]   ASSESS BREATH SOUNDS  [x]   IMPLEMENT AEROSOL/MDI PROTOCOL  [x]   PATIENT EDUCATION AS NEEDED     PROVIDE ADEQUATE OXYGENATION WITH ACCEPTABLE SP02/ABG'S    [x]  IDENTIFY APPROPRIATE OXYGEN THERAPY  [x]   MONITOR SP02/ABG'S AS NEEDED   [x]   PATIENT EDUCATION AS NEEDED     NON INVASIVE VENTILATION    PROVIDE OPTIMAL VENTILATION/ACCEPTABLE SP02  IMPLEMENT NON INVASIVE VENTILATION PROTOCOL  ASSESSMENT SKIN INTEGRITY  PATIENT EDUCATION AS NEEDED  BIPAP AS NEEDED

## 2021-09-27 NOTE — PROGRESS NOTES
[facility]  Progress Note    Abdirizak Leija  MRN: [id number]  Date: 9/27/2021  LOS: 6     Reason for F/U :   COVID-19 virus pneumonia    Impression :   COVID-19 virus infection tested + 9/17/2021  Acute hypoxic respiratory failure  Elevated inflammatory marker with CRP greater than 200  Responded well to medical treatment  Asymptomatic bacteriuria with E. coli isolated in the urine  Morbid obesity  Diabetes mellitus type 2    Recommendations:   Continue high-dose Decadron and is on 20 mg IV daily through 9/26/2021 and now down to 10 mg IV daily for 5 days starting today  The patient received a dose of Actemra 9/22/2021  The patient continues on high flow O2 by nasal cannula at 60 L and 90%. The FiO2 requirements have not decreased but the patient is not clinically worse either. The patient still has bilateral crackles and will likely need LTAC at discharge  Continue supportive therapy    Infection Control Recommendations:   Droplet plus precautions    Discharge Planning:   Patient will need Midline Catheter Insertion/ PICC line Insertion: No  Patient will need: Home IV , [place],  SNF,  LTAC: Undetermined  Patient willneed outpatient wound care: No    Medical Decision making / Summary of Stay:   Abdirizak Leija is a 45y.o.-year-old female who was initially admitted on 9/21/2021. [employer] is an unvaccinated 67-year-old woman with a history of diabetes mellitus type 2, hypertension, chronic back pain, anemia and morbid obesity among other medical problems. She reports that on Tuesday, September 13, 2021 she started having symptoms with a cough, headache, generalized malaise. She does not report any subjective fevers, chills or sweats but subsequently started to develop some shortness of breath as well as some diarrhea.   The diarrhea though she could not quite say if it was related to this illness as she does have irritable bowel syndrome and intermittently does have diarrhea. The patient did subsequently have COVID-19 testing done on 2021 and was found to be Covid positive. By Saturday she reports that she was having significant episodes of shortness of breath which prompted her to come into the emergency department for evaluation.     In the ED he was noted to be hypoxic with saturations in the 80s and was started on 6 L of oxygen by nasal cannula and due to worsening hypoxia the patient was placed on high flow O2 by nasal cannula at 40 L and 100% and was also placed on a nonrebreather. The BiPAP was also ordered and the patient was seen by the pulmonology service earlier today. The patient had initially been started on remdesivir and Decadron and the Decadron dose was increased to high-dose Decadron today. The patient was given Actemra 2021    CRP is continued to trend down and the patient has responded well to therapy. Chest x-ray 2021 showed improved airspace opacity      Current evaluation:2021    /82   Pulse 60   Temp 97.8 °F (36.6 °C) (Axillary)   Resp 30   Ht 5' 3\" (1.6 m)   Wt 270 lb 4.8 oz (122.6 kg)   SpO2 95%   BMI 47.88 kg/m²     Temperature Range: Temp: 97.8 °F (36.6 °C) Temp  Av.9 °F (36.6 °C)  Min: 97.8 °F (36.6 °C)  Max: 98 °F (36.7 °C)  The patient is seen and evaluated at bedside she is awake and alert in no acute distress. She continues on high flow O2 by nasal cannula currently on 60 L and 90%. She does not report any fevers or chills. She does appear fatigued today. Review of Systems   Constitutional: Negative. Respiratory: Positive for shortness of breath. Cardiovascular: Negative. Gastrointestinal: Negative. Genitourinary: Negative. Musculoskeletal: Negative. Skin: Negative. Neurological: Negative. Psychiatric/Behavioral: Negative. Physical Examination :     Physical Exam  Constitutional:       Appearance: She is well-developed. She is obese.    HENT:      Head: Normocephalic and atraumatic. Cardiovascular:      Rate and Rhythm: Regular rhythm. Heart sounds: Normal heart sounds. Pulmonary:      Breath sounds: Rales present. Abdominal:      General: Bowel sounds are normal.      Palpations: Abdomen is soft. Musculoskeletal:      Cervical back: Neck supple. Skin:     General: Skin is warm and dry. Neurological:      Mental Status: She is alert and oriented to person, place, and time. Laboratory data:   I have independently reviewed the followinglabs:  CBC with Differential:   Recent Labs     09/25/21 0542 09/26/21 0517   WBC 4.9 6.0   HGB 15.0 14.9   HCT 48.3* 43.5   PLT See Reflexed IPF Result 238     BMP:   Recent Labs     09/25/21 0542 09/26/21 0517    132*   K 3.9 3.9    96*   CO2 21 20   BUN 26* 29*   CREATININE 0.59 0.68     Hepatic Function Panel:   No results for input(s): PROT, LABALBU, BILIDIR, IBILI, BILITOT, ALKPHOS, ALT, AST in the last 72 hours. Lab Results   Component Value Date    PROCAL 0.09 09/24/2021    PROCAL 0.13 09/21/2021     Lab Results   Component Value Date    CRP 47.2 09/24/2021    .3 09/23/2021    .8 09/22/2021     No results found for: Julien Yoder      Lab Results   Component Value Date    DDIMER 0.54 09/21/2021     Lab Results   Component Value Date    FERRITIN 706 09/22/2021    FERRITIN 756 09/21/2021     No results found for: LDH  Lab Results   Component Value Date    FIBRINOGEN 583 09/21/2021       Results in Past 30 Days  Result Component Current Result Ref Range Previous Result Ref Range   SARS-CoV-2, Rapid DETECTED (A) (9/21/2021) Not Detected Not in Time Range      Lab Results   Component Value Date    COVID19 DETECTED 09/21/2021       No results for input(s): [facility] in the last 72 hours. Imaging Studies:   ONE XRAY VIEW OF THE CHEST 9/24/2021 7:05 pm  Impression   Bilateral diffuse pulmonary edema and or pneumonia without definite effusion. No cardiomegaly was identified.     Slight improvement bilaterally has occurred from 09/21/2021. Cultures:     Culture, Urine [[id number]] (Abnormal) Collected: 09/22/21 1925   Order Status: Completed Specimen: Urine Random Updated: 09/23/21 1436    Specimen Description . Random Urine    Special Requests NOT REPORTED    Culture ESCHERICHIA COLI 50 to 100,000 CFU/MLAbnormal    Escherichia coli (1)    Antibiotic Interpretation DEBBY Status    amikacin  NOT REPORTED Final    ampicillin Sensitive 4 Final    ampicillin-sulbactam  NOT REPORTED Final    aztreonam Sensitive <=1 Final    ceFAZolin Sensitive <=4 Final    ceFAZolin Sensitive Cefazolin sensitivity results can be used to predict the effectiveness of oral cephalosporins (eg. Cephalexin) in uncomplicated Urinary Tract Infections due to E. coli, K. pneumoniae, and P. mirabilis Final    cefepime  NOT REPORTED Final    cefTRIAXone Sensitive <=1 Final    ciprofloxacin Sensitive <=0.25 Final    ertapenem  NOT REPORTED Final    Confirmatory Extended Spectrum Beta-Lactamase Negative NEGATIVE Final    gentamicin Sensitive <=1 Final    meropenem  NOT REPORTED Final    nitrofurantoin Sensitive <=16 Final    tigecycline  NOT REPORTED Final    tobramycin Sensitive <=1 Final    trimethoprim-sulfamethoxazole Sensitive <=20 Final    piperacillin-tazobactam Sensitive <=4 Final        COVID-19, Rapid [[id number]] (Abnormal) Collected: 09/21/21 2123   Order Status: Completed Specimen: Nasopharyngeal Swab Updated: 09/21/21 2154    Specimen Description . NASOPHARYNGEAL SWAB    SARS-CoV-2, Rapid DETECTEDAbnormal     Comment:        Rapid NAAT: The specimen is POSITIVE for SARS-Cov-2, the novel coronavirus associated with   COVID-19.         This test has been authorized by the FDA under an Emergency Use Authorization (EUA) for use   by authorized laboratories.         The ID NOW COVID-19 assay is designed to detect the virus that causes COVID-19 in patients   with signs and symptoms of infection who are suspected of COVID-19. An individual without symptoms of COVID-19 and who is not shedding SARS-CoV-2 virus would   expect to have a negative (not detected) result in this assay. Fact sheet for Healthcare Providers: Ping   Fact sheet for Patients: Ping           Methodology: Isothermal Nucleic Acid Amplification         Results reported to the appropriate Health Department               Medications:      insulin glargine  55 Units SubCUTAneous BID    vitamin C  500 mg Oral BID    fluticasone  1 spray Each Nostril Daily    cetirizine  10 mg Oral Daily    insulin lispro  0-18 Units SubCUTAneous TID WC    insulin lispro  0-9 Units SubCUTAneous Nightly    albuterol sulfate HFA  2 puff Inhalation 4x daily    And    ipratropium  2 puff Inhalation 4x daily    famotidine  20 mg Oral Daily    dexamethasone  10 mg IntraVENous Daily    aspirin  81 mg Oral Daily    ferrous sulfate  325 mg Oral Daily with breakfast    sodium chloride flush  5-40 mL IntraVENous 2 times per day    enoxaparin  30 mg SubCUTAneous BID    Vitamin D  2,000 Units Oral Daily           [facility]  Pavan Watts MD  [employer] messaging  OFFICE: [phone]      Electronically signed by Pavan Watts MD on 9/27/2021 at 2:12 PM  Thank you for allowing us to participate in the care of this patient. Please call with questions. This note iscreated with the assistance of a speech recognition program.  While intending to generate a document that actually reflects the content of the visit, the document can still have some errors including those of syntax andsound a like substitutions which may escape proof reading. In such instances, actual meaning can be extrapolated by contextual diversion.

## 2021-09-27 NOTE — CARE COORDINATION
Transitional Planning  Spoke with pt about LTACH her first choice is [facility]. Her goal is home but understands she may need LTACH if she is on a large amount of oxygen.   E-referral to [facility] with referral he will follow

## 2021-09-27 NOTE — PROGRESS NOTES
Physical Therapy  Facility/Department: [facility] CAR 2  Daily Treatment Note  NAME: Manjinder Gamez  : 1980  MRN: [id number]    Date of Service: 2021    Discharge Recommendations:  Patient would benefit from continued therapy after discharge   PT Equipment Recommendations  Equipment Needed: No (unable to assess standing/ambulation this date.)    Assessment   Body structures, Functions, Activity limitations: Decreased functional mobility ; Decreased balance;Decreased endurance;Decreased ADL status  Assessment: Pt performed minimal LE supine exericses this date with SPO2 dropping to 76% whileon high flow O2. Deferred further mobility this date, pt placed back on bipap. Would benefit from continued therapy as appropriate to address further mobility deficits and return to prior level of independence. Prognosis: Good  PT Education: Goals;PT Role;Plan of Care; Functional Mobility Training;Gait Training;Transfer Training  REQUIRES PT FOLLOW UP: Yes  Activity Tolerance  Activity Tolerance: Patient limited by endurance  Activity Tolerance: SPO2 decreasing with minimal exertion. Patient Diagnosis(es): The encounter diagnosis was COVID-19.     has a past medical history of Anemia, Chronic back pain, Diabetes mellitus ([address] H/O LEEP, Hypertension, and S/P endometrial ablation. has a past surgical history that includes LEEP and Endometrial ablation. Restrictions  Restrictions/Precautions  Restrictions/Precautions: Isolation, Up as Tolerated, Fall Risk (COVID+)  Required Braces or Orthoses?: No  Position Activity Restriction  Other position/activity restrictions: High-flow O2  Subjective   General  Chart Reviewed: Yes  Response To Previous Treatment: Patient with no complaints from previous session. Family / Caregiver Present: No  Subjective  Subjective: Pt and RN agreeable to PT this morning. Pt supine in bed upon arrival on high flow O2 with no c/o pain.   General Comment  Comments: Upon arrival to room pt's SPO2 ~85%. Pain Screening  Patient Currently in Pain: Denies  Vital Signs  Patient Currently in Pain: Denies       Orientation  Orientation  Overall Orientation Status: Within Functional Limits  Cognition   Cognition  Overall Cognitive Status: WFL  Objective   Bed mobility  Comment: VIDA this date d/t respiratory status. Transfers  Comment: VIDA d/t respiratory status  Ambulation  Ambulation?: No  Stairs/Curb  Stairs?: No    Exercises  Heelslides: x10  Ankle Pumps: x10  Comments: Following 2 LE supine exercises pt's SPO2 dropped to 76% while on high flow O2, Respiratory therapist present in room deciding to place pt back on bipap. Deferred additional therapy this date. Pt recovered to ~91% while on bipap.             AM-PAC Score  AM-PAC Inpatient Mobility Raw Score : 8 (09/27/21 1327)  AM-PAC Inpatient T-Scale Score : 28.52 (09/27/21 1327)  Mobility Inpatient CMS 0-100% Score: 86.62 (09/27/21 1327)  Mobility Inpatient CMS G-Code Modifier : CM (09/27/21 1327)          Goals  Short term goals  Time Frame for Short term goals: 14 visits  Short term goal 1: Perform bed mobility and functional transfers independently  Short term goal 2: Ambulate 300ft without AD and supervision  Short term goal 3: Participate in 30 minutes of therapy with SPO2 >90% to demo increased endurance  Short term goal 4: Ascend/descend 2 steps with HR and SBA    Plan    Plan  Times per week: 3-5x/wk  Current Treatment Recommendations: Strengthening, ROM, Balance Training, Functional Mobility Training, Transfer Training, Stair training, Gait Training, Endurance Training, Home Exercise Program, Safety Education & Training, Patient/Caregiver Education & Training  Safety Devices  Type of devices: Nurse notified, Call light within reach, Gait belt, Left in bed  Restraints  Initially in place: No     Therapy Time   Individual Concurrent Group Co-treatment   Time In 0906         Time Out 0917         Minutes 11         Timed Code Treatment Minutes: 11 Minutes       Nelly Martinez, PTA

## 2021-09-27 NOTE — PROGRESS NOTES
PULMONARY & CRITICAL CARE MEDICINE PROGRESS NOTE     Patient:  Kenia Hawkins  MRN: [id number]  Admit date: 9/21/2021  Primary Care Physician: Sourav Andrews  Consulting Physician: Leobardo Boyle DO  CODE Status: Full Code  LOS: 6     SUBJECTIVE     CHIEF COMPLAINT/REASON FOR INITIAL CONSULT:   Acute respiratory failure/COVID-19 pneumonia    BRIEF HOSPITAL COURSE:   The patient is a 45 y.o. female history of diabetes mellitus, hypertension, morbid obesity  unvaccinated was admitted with shortness of breath and diagnosed with COVID-19 pneumonia. She apparently was diagnosed about 4 days ago. There was associated cough that is mostly dry and also having some diarrhea. Patient required increasing oxygen with initial oxygen saturation being in the 80s. No previous history of lung disease  Patient is a non-smoker    INTERVAL HISTORY:  09/27/21  Overnight events reviewed. Chart seen, labs seen and last imaging studies seen. Overnight patient is afebrile with T-max of 98. She is hemodynamically stable. Respiratory rate is in high 20s mostly and sometime in 30s. She remains on high flow nasal cannula with 90% and 60 L. She is on BiPAP 16/8/80 percent. Per nursing staff she was taken off BiPAP this morning but she was desaturating on high flow after some time and she was put back on BiPAP. When I saw her she was on high flow nasal cannula she was tachypneic but not in distress she was able to talk alert and awake and she was saturating 91 to 93% on high flow nasal cannula. According to patient she is feeling the same as she was yesterday she has cough she has mild sputum production denies wheezing denies chest pain she was out of bed to chair yesterday. Her appetite is okay. She had received 1 dose of Lasix on 09/25/2021. Chest x-ray on 09/24/2021 shows bilateral pulmonary infiltrate/interstitial infiltrate cannot rule out pleural effusion.   Not much change from chest x-ray on 09/21/2021      REVIEW OF SYSTEMS:  Review of Systems   Constitutional: Positive for activity change and fatigue. Negative for appetite change and fever. HENT: Negative for postnasal drip, rhinorrhea, sore throat, trouble swallowing and voice change. Eyes: Negative for photophobia, redness and visual disturbance. Respiratory: Positive for cough and shortness of breath. Negative for wheezing. Cardiovascular: Negative for chest pain, palpitations and leg swelling. Gastrointestinal: Negative for abdominal pain, constipation, diarrhea, nausea and vomiting. Endocrine: Negative for polyuria. Genitourinary: Negative for dysuria, frequency, hematuria and urgency. Musculoskeletal: Negative. Allergic/Immunologic: Negative. Neurological: Negative for dizziness, syncope, speech difficulty and headaches. Hematological: Negative for adenopathy. Does not bruise/bleed easily. Psychiatric/Behavioral: Negative. OBJECTIVE     PaO2/FiO2 RATIO:  No results for input(s): POCPO2 in the last 72 hours. FiO2 : 90 %     VITAL SIGNS:   LAST:  /82   Pulse 60   Temp 97.8 °F (36.6 °C) (Axillary)   Resp 30   Ht 5' 3\" (1.6 m)   Wt 270 lb 4.8 oz (122.6 kg)   SpO2 95%   BMI 47.88 kg/m²   8-24 HR RANGE:  TEMP Temp  Av.9 °F (36.6 °C)  Min: 97.8 °F (36.6 °C)  Max: 98 °F (75.7 °C)   BP Systolic (25CPS), JNX:374 , Min:101 , KMH:024      Diastolic (06LHM), SNW:62, Min:56, Max:83     PULSE Pulse  Av.4  Min: 60  Max: 97   RR Resp  Av.3  Min: 26  Max: 34   O2 SAT SpO2  Av %  Min: 80 %  Max: 96 %   OXYGEN DELIVERY O2 Flow Rate (L/min)  Av L/min  Min: 60 L/min  Max: 60 L/min        SYSTEMIC EXAMINATION:   General appearance - Ill-appearing, mild to moderate respiratory distress  Mental status - awake & alert, follows commands  Eyes - pupils equal and reactive, sclera anicteric  Mouth - mucous membranes moist, pharynx normal without lesions.   Large tongue Mallampati 2  Neck -Short thick neck supple, no significant adenopathy, carotids upstroke normal bilaterally, no bruits  Chest - There is no intercostal recession or use of accessory muscles. Breath sounds bilaterally were dimnished to auscultation at bases. There were mild crackles present at bases. No expiratory wheezing and no rhonchi  Heart - normal rate, regular rhythm, normal S1, S2, no murmurs, rubs, clicks or gallops  Abdomen - soft, nontender, nondistended, no masses or organomegaly  Neurological - non-focal  Extremities - peripheral pulses normal, mild bilateral pedal edema, no clubbing or cyanosis. No calf tenderness  Skin - normal coloration and turgor, no rashes, no suspicious skin lesions noted     DATA REVIEW     Medications: Current Inpatient  Scheduled Meds:   insulin glargine  55 Units SubCUTAneous BID    vitamin C  500 mg Oral BID    fluticasone  1 spray Each Nostril Daily    cetirizine  10 mg Oral Daily    insulin lispro  0-18 Units SubCUTAneous TID WC    insulin lispro  0-9 Units SubCUTAneous Nightly    albuterol sulfate HFA  2 puff Inhalation 4x daily    And    ipratropium  2 puff Inhalation 4x daily    famotidine  20 mg Oral Daily    dexamethasone  10 mg IntraVENous Daily    aspirin  81 mg Oral Daily    ferrous sulfate  325 mg Oral Daily with breakfast    sodium chloride flush  5-40 mL IntraVENous 2 times per day    enoxaparin  30 mg SubCUTAneous BID    Vitamin D  2,000 Units Oral Daily     Continuous Infusions:   dextrose      sodium chloride 25 mL (09/22/21 1822)       INPUT/OUTPUT:  No intake/output data recorded. LABS:  ABGs:   No results for input(s): POCPH, POCPCO2, POCPO2, POCHCO3, NLQY2KSO in the last 72 hours. CBC:   Recent Labs     09/25/21  0542 09/26/21  0517   WBC 4.9 6.0   HGB 15.0 14.9   HCT 48.3* 43.5   MCV 95.6 85.0   PLT See Reflexed IPF Result 238   RBC 5.05 5.12*   MCH 29.7 29.1   MCHC 31.1 34.3   RDW 13.4 12.8     CRP:   No results for input(s): CRP in the last 72 hours.   LDH:   No results for input(s): LDH in the last 72 hours. BMP:   Recent Labs     09/25/21  0542 09/26/21  0517    132*   K 3.9 3.9    96*   CO2 21 20   BUN 26* 29*   CREATININE 0.59 0.68   GLUCOSE 272* 329*     Liver Function Test:   No results for input(s): PROT, LABALBU, ALT, AST, GGT, ALKPHOS, BILITOT in the last 72 hours. Coagulation Profile:   No results for input(s): INR, PROTIME, APTT in the last 72 hours. D-Dimer:  No results for input(s): DDIMER in the last 72 hours. Ferritin:    No results for input(s): FERRITIN in the last 72 hours. Lactic Acid:  No results for input(s): LACTA in the last 72 hours. Cardiac Enzymes:  No results for input(s): CKTOTAL, CKMB, CKMBINDEX, TROPONINI in the last 72 hours. Invalid input(s): TROPONIN, HSTROP  BNP/ProBNP:   No results for input(s): BNP, PROBNP in the last 72 hours. Triglycerides:  No results for input(s): TRIG in the last 72 hours. Microbiology:  Urine Culture:  No components found for: CURINE  Blood Culture:  No components found for: CBLOOD, CFUNGUSBL  Sputum Culture:  No components found for: CSPUTUM  No results for input(s): SPECDESC, SPECIAL, CULTURE, STATUS, ORG, CDIFFTOXPCR, CAMPYLOBPCR, SALMONELLAPC, SHIGAPCR, SHIGELLAPCR, MPNEUG, MPNEUM, LACTOQL in the last 72 hours. No results for input(s): SPUTUM, SPECDESC, SPECIAL, CULTURE, STATUS, ORG, CDIFFTOXPCR, MPNEUM, MPNEUG in the last 72 hours. Invalid input(s): CURINE, CBLOOD, CFUNGUSBL     Pathology:    Radiology Reports:  XR CHEST (SINGLE VIEW FRONTAL)   Final Result   Bilateral diffuse pulmonary edema and or pneumonia without definite effusion. No cardiomegaly was identified. Slight improvement bilaterally has occurred from 09/21/2021. XR CHEST PORTABLE   Final Result   Multifocal airspace opacities worrisome multifocal atypical viral pneumonia. Low lung volumes              Echocardiogram:   No results found for this or any previous visit.        ASSESSMENT AND PLAN     Assessment:    // Acute hypoxic respiratory failure  // Acute respiratory distress syndrome  // Bilateral multifocal pneumonia due to COVID 19 infection  // Diabetes mellitus.  // Morbid obesity  // Likely obstructive sleep apnea/hypoventilation  // Hypertension  // E. coli in urine    Plan:    I personally interviewed/examined the patient; reviewed interval history, interpreted all available radiographic and laboratory data at the time of service. Patient is currently on high flow nasal cannula requiring 90% and 60 L to maintain saturation above 90% as tolerated. Continue with BiPAP every night and also recommend to use BiPAP intermittently during the daytime alternating with high flow. S consider daily Lasix according to intake and output and monitoring of electrolytes. She is on high-dose Decadron 20 mg started on 09/22/2021 finish 09/26/2021 now on 10 mg IV daily. She is a status post Actemra dose on 09/22/2021. Encourage prone position when sleeping, incentive spirometry  Monitor I/O, electrolytes with a goal of negative fluid balance  Continue to monitor CBC, coagulation profile, transfuse as indicated  Chemical DVT prophylaxis as per protocol on Lovenox 30 mg twice daily  Antimicrobials reviewed; currently not on antibiotic  Monitor CRP, LDH, AST/ALT, D-Dimer, Ferritin periodically  Glycemic control per primary service on Lantus. Physical/occupational therapy    Patient need to be monitored closely as she is on high flow nasal cannula 90% and on BiPAP and remain tachypneic    Discussed with respiratory therapist treatment plan discussed  Discussed with nursing staff. Riccardo Moses MD   Pulmonary and Critical Care Medicine           9/27/2021, 1:05 PM    This patient was evaluated in the context of the global SARS-CoV-2 (COVID-19) pandemic, which necessitated considerations that the patient either has COVID-19 infection or is at risk of infection with COVID-19.  Institutional protocols and algorithms that pertain to the evaluation & management of patients with COVID-19 or those at risk for COVID-19 are in a state of rapid changes based on information released by regulatory bodies including the CDC and federal and state organizations. These policies and algorithms were followed during the patient's care. Please note that this chart was generated using voice recognition Dragon dictation software. Although every effort was made to ensure the accuracy of this automated transcription, some errors in transcription may have occurred.

## 2021-09-27 NOTE — PLAN OF CARE
Problem: Airway Clearance - Ineffective  Goal: Achieve or maintain patent airway  9/26/2021 2108 by Louise Hernandez RN  Outcome: Ongoing  9/26/2021 1911 by Archana Paulson RN  Outcome: Ongoing     Problem: Gas Exchange - Impaired  Goal: Absence of hypoxia  9/26/2021 2108 by Louise Hernandez RN  Outcome: Ongoing  9/26/2021 1911 by Archana Paulson RN  Outcome: Ongoing  Goal: Promote optimal lung function  9/26/2021 2108 by Louise Hernandez RN  Outcome: Ongoing  9/26/2021 1911 by Archana Paulson RN  Outcome: Ongoing     Problem: Breathing Pattern - Ineffective  Goal: Ability to achieve and maintain a regular respiratory rate  9/26/2021 2108 by Louise Hernandez RN  Outcome: Ongoing  9/26/2021 1911 by Archana Paulson RN  Outcome: Ongoing     Problem:  Body Temperature -  Risk of, Imbalanced  Goal: Ability to maintain a body temperature within defined limits  9/26/2021 2108 by Louise Hernandez RN  Outcome: Ongoing  9/26/2021 1911 by Archana Paulson RN  Outcome: Ongoing  Goal: Will regain or maintain usual level of consciousness  9/26/2021 2108 by Louise Hernandez RN  Outcome: Ongoing  9/26/2021 1911 by Archana Paulson RN  Outcome: Ongoing  Goal: Complications related to the disease process, condition or treatment will be avoided or minimized  9/26/2021 2108 by Louise Hernandez RN  Outcome: Ongoing  9/26/2021 1911 by Archana Paulson RN  Outcome: Ongoing     Problem: Isolation Precautions - Risk of Spread of Infection  Goal: Prevent transmission of infection  9/26/2021 2108 by Louise Hernandez RN  Outcome: Ongoing  9/26/2021 1911 by Archana Paulson RN  Outcome: Ongoing     Problem: Nutrition Deficits  Goal: Optimize nutritional status  9/26/2021 2108 by Louise Hernandez RN  Outcome: Ongoing  9/26/2021 1911 by Archana Paulson RN  Outcome: Ongoing     Problem: Risk for Fluid Volume Deficit  Goal: Maintain normal heart rhythm  9/26/2021 2108 by Louise Hernandez RN  Outcome: Ongoing  9/26/2021 1911 by Archana Paulson RN  Outcome: Ongoing  Goal: Maintain absence of muscle cramping  9/26/2021 2108 by Julia Eid RN  Outcome: Ongoing  9/26/2021 1911 by Jelly Nath RN  Outcome: Ongoing  Goal: Maintain normal serum potassium, sodium, calcium, phosphorus, and pH  9/26/2021 2108 by Julia Eid RN  Outcome: Ongoing  9/26/2021 1911 by Jelly Nath RN  Outcome: Ongoing     Problem: Loneliness or Risk for Loneliness  Goal: Demonstrate positive use of time alone when socialization is not possible  9/26/2021 2108 by Julia Eid RN  Outcome: Ongoing  9/26/2021 1911 by Jelly Nath RN  Outcome: Ongoing     Problem: Fatigue  Goal: Verbalize increase energy and improved vitality  9/26/2021 2108 by Julia Eid RN  Outcome: Ongoing  9/26/2021 1911 by Jelly Nath RN  Outcome: Ongoing     Problem: Patient Education: Go to Patient Education Activity  Goal: Patient/Family Education  9/26/2021 2108 by Julia Eid RN  Outcome: Ongoing  9/26/2021 1911 by Jelly Nath RN  Outcome: Ongoing     Problem: Falls - Risk of:  Goal: Will remain free from falls  Description: Will remain free from falls  9/26/2021 2108 by Julia iEd RN  Outcome: Ongoing  9/26/2021 1911 by Jelly Nath RN  Outcome: Ongoing  Goal: Absence of physical injury  Description: Absence of physical injury  9/26/2021 2108 by Julia Eid RN  Outcome: Ongoing  9/26/2021 1911 by Jelly Nath RN  Outcome: Ongoing

## 2021-09-28 PROBLEM — E66.01 OBESITY, MORBID, BMI 40.0-49.9 (HCC): Status: ACTIVE | Noted: 2021-01-01

## 2021-09-28 PROBLEM — Z79.4 TYPE 2 DIABETES MELLITUS WITH HYPERGLYCEMIA, WITH LONG-TERM CURRENT USE OF INSULIN (HCC): Status: ACTIVE | Noted: 2021-01-01

## 2021-09-28 PROBLEM — A41.89 SEPSIS DUE TO COVID-19 (HCC): Status: ACTIVE | Noted: 2021-01-01

## 2021-09-28 PROBLEM — U07.1 SEPSIS DUE TO COVID-19 (HCC): Status: ACTIVE | Noted: 2021-01-01

## 2021-09-28 PROBLEM — J96.01 ACUTE RESPIRATORY FAILURE WITH HYPOXIA (HCC): Status: ACTIVE | Noted: 2021-01-01

## 2021-09-28 PROBLEM — E11.65 TYPE 2 DIABETES MELLITUS WITH HYPERGLYCEMIA, WITH LONG-TERM CURRENT USE OF INSULIN (HCC): Status: ACTIVE | Noted: 2021-01-01

## 2021-09-28 NOTE — PROGRESS NOTES
Patients oxygen saturation has been below 90, patient is receiving 100% oxygen via BiPAP. Notified on call NP, awaiting orders.

## 2021-09-28 NOTE — PROGRESS NOTES
Comprehensive Nutrition Assessment    Type and Reason for Visit:  Initial (LOS Day 7)    Nutrition Recommendations/Plan:   -Continue 5 CHO diabetic diet   -Suggest glucerna supplements BID   -Will monitor po intake and weights     Nutrition Assessment:  Pt admitted d/t SOB 2/2 +COVID-19 - currently in droplet plus precaution room. Pt currently on BiPaP during time of visit. Per EMR, pt has had a variable appetite this week consuming % of her meals. Pt w/ 4.8% wt loss x 1 wk, significant. No wt hx in EMR. UBW is unknown. Will start nutritional supplements d/t variable po intake and wt loss - will monitor. Malnutrition Assessment:  Malnutrition Status:  Insufficient data    Context:  Acute Illness     Findings of the 6 clinical characteristics of malnutrition:  Energy Intake:   (variable po intake x 1 wk)  Weight Loss:  7 - Greater than 2% over 1 week     Body Fat Loss:  Unable to assess     Muscle Mass Loss:  Unable to assess    Fluid Accumulation:  No significant fluid accumulation     Strength:  Not Performed    Estimated Daily Nutrient Needs:  Energy (kcal):  1.2-1.3 ~> 8902-9353 kcals/d; Weight Used for Energy Requirements:  Current     Protein (g):  1.2-1.3 gm/kg ~> 62-68 gms/d; Weight Used for Protein Requirements:  Ideal        Fluid (ml/day):  3000 mLs/d OR per MD discretion; Method Used for Fluid Requirements:   ([place])      Nutrition Related Findings:  BM 9/24; Na 132, glucose 215; meds reviewed      Wounds:  None       Current Nutrition Therapies:    ADULT DIET; Regular; 5 carb choices (75 gm/meal);  Safety Tray; Safety Tray (Disposables)    Anthropometric Measures:  · Height: 5' 3\" (160 cm)  · Current Body Weight: 257 lb (116.6 kg)   · Admission Body Weight: 270 lb (122.5 kg)    · Ideal Body Weight: 115 lbs; % Ideal Body Weight 223.5 %   · BMI: 45.5  · BMI Categories: Obese Class 3 (BMI 40.0 or greater)       Nutrition Diagnosis:   · Inadequate oral intake related to cardiac dysfunction (resp. distress) as evidenced by intake 26-50%, intake 51-75% (variable po intake, need for ONS)      Nutrition Interventions:   Food and/or Nutrient Delivery:  Continue Current Diet, Start Oral Nutrition Supplement  Nutrition Education/Counseling:  Education not indicated   Coordination of Nutrition Care:  Continue to monitor while inpatient    Goals: Set   Pt to meet % of est'd needs via PO daily       Nutrition Monitoring and Evaluation:   Food/Nutrient Intake Outcomes:  Food and Nutrient Intake, Supplement Intake  Physical Signs/Symptoms Outcomes:  Biochemical Data, Nutrition Focused Physical Findings, Skin, Weight, GI Status, Fluid Status or Edema     Discharge Planning:     Too soon to determine     Electronically signed by Vince Bravo RD, LD on 9/28/21 at 12:35 PM EDT    Contact: [phone]

## 2021-09-28 NOTE — PROGRESS NOTES
Physical Therapy        Physical Therapy Cancel Note      DATE: 2021    NAME: Kell Denton  MRN: [id number]   : 1980      Patient not seen this date for Physical Therapy due to:    Patient is not appropriate for PT evaluation/treatment at this time d/t : SPO2 74% supine in bed on hi-flow increasing to 84%.  Therapy will resume 2021      Electronically signed by Cain Tobin PTA on 2021 at 11:29 AM

## 2021-09-28 NOTE — PROGRESS NOTES
PULMONARY & CRITICAL CARE MEDICINE PROGRESS NOTE     Patient:  Leonel Harden  MRN: [id number]  Admit date: 9/21/2021  Primary Care Physician: Lisa Crystal  Consulting Physician: Supriya Lopez DO  CODE Status: Full Code  LOS: 7     SUBJECTIVE     CHIEF COMPLAINT/REASON FOR INITIAL CONSULT:   Acute respiratory failure/COVID-19 pneumonia    BRIEF HOSPITAL COURSE:   The patient is a 45 y.o. female history of diabetes mellitus, hypertension, morbid obesity  unvaccinated was admitted with shortness of breath and diagnosed with COVID-19 pneumonia. She apparently was diagnosed about 4 days ago. There was associated cough that is mostly dry and also having some diarrhea. Patient required increasing oxygen with initial oxygen saturation being in the 80s. No previous history of lung disease  Patient is a non-smoker    INTERVAL HISTORY:  09/28/21  Pt was seen and examined at bedside. Resting comfortably in the bed. Saturating well on BiPAP 18/10 at 90%  Using intermittent HFNC and BiPAP  Blood gases 7.522/35/178/28  Vitals stable. Labs not done today  No acute events overnight. REVIEW OF SYSTEMS:  Review of Systems   Constitutional: Positive for fatigue. Negative for appetite change and fever. HENT: Negative for postnasal drip, rhinorrhea, sore throat, trouble swallowing and voice change. Eyes: Negative for redness and visual disturbance. Respiratory: Positive for cough and shortness of breath. Negative for wheezing. Cardiovascular: Negative for chest pain, palpitations and leg swelling. Gastrointestinal: Negative for abdominal pain, constipation, diarrhea, nausea and vomiting. Endocrine: Negative for polyuria. Genitourinary: Negative for dysuria, frequency, hematuria and urgency. Musculoskeletal: Negative. Allergic/Immunologic: Negative. Neurological: Negative for dizziness, syncope, speech difficulty and headaches. Hematological: Negative for adenopathy.  Does not bruise/bleed easily. Psychiatric/Behavioral: Negative. OBJECTIVE     PaO2/FiO2 RATIO:  Recent Labs     21  0511   POCPO2 178.4*      FiO2 : 90 %     VITAL SIGNS:   LAST:  /82   Pulse 80   Temp 96.6 °F (35.9 °C) (Axillary)   Resp (!) 34   Ht 5' 3\" (1.6 m)   Wt 257 lb 15 oz (117 kg)   SpO2 90%   BMI 45.69 kg/m²   8-24 HR RANGE:  TEMP Temp  Av.4 °F (36.3 °C)  Min: 96.6 °F (35.9 °C)  Max: 98.2 °F (73.3 °C)   BP Systolic (61VWR), UBL:910 , Min:128 , OBX:058      Diastolic (31OIG), RKS:53, Min:82, Max:96     PULSE Pulse  Av.1  Min: 80  Max: 90   RR Resp  Av.9  Min: 22  Max: 35   O2 SAT SpO2  Av.1 %  Min: 88 %  Max: 100 %   OXYGEN DELIVERY O2 Flow Rate (L/min)  Av.3 L/min  Min: 50 L/min  Max: 60 L/min        SYSTEMIC EXAMINATION:   General appearance - Ill-appearing, mild to moderate respiratory distress  Mental status - awake & alert, follows commands  Eyes - pupils equal and reactive, sclera anicteric  Mouth - mucous membranes moist, pharynx normal without lesions. Large tongue Mallampati 2  Neck -Short thick neck supple, no significant adenopathy, carotids upstroke normal bilaterally, no bruits  Chest - There is no intercostal recession or use of accessory muscles. Breath sounds bilaterally were dimnished to auscultation at bases. There were mild crackles present at bases. No expiratory wheezing and no rhonchi  Heart - normal rate, regular rhythm, normal S1, S2, no murmurs, rubs, clicks or gallops  Abdomen - soft, nontender, nondistended, no masses or organomegaly  Neurological - non-focal  Extremities - peripheral pulses normal, mild bilateral pedal edema, no clubbing or cyanosis.   No calf tenderness  Skin - normal coloration and turgor, no rashes, no suspicious skin lesions noted     DATA REVIEW     Medications: Current Inpatient  Scheduled Meds:   insulin glargine  50 Units SubCUTAneous BID    vitamin C  500 mg Oral BID    fluticasone  1 spray Each Nostril Daily    cetirizine  10 mg Oral Daily    insulin lispro  0-18 Units SubCUTAneous TID     insulin lispro  0-9 Units SubCUTAneous Nightly    famotidine  20 mg Oral Daily    dexamethasone  10 mg IntraVENous Daily    aspirin  81 mg Oral Daily    ferrous sulfate  325 mg Oral Daily with breakfast    sodium chloride flush  5-40 mL IntraVENous 2 times per day    enoxaparin  30 mg SubCUTAneous BID    Vitamin D  2,000 Units Oral Daily     Continuous Infusions:   dextrose      sodium chloride 25 mL (09/22/21 1822)       INPUT/OUTPUT:  No intake/output data recorded. LABS:  ABGs:   Recent Labs     09/28/21  0511   POCPH 7.522*   POCPCO2 35.0   POCPO2 178.4*   POCHCO3 28.7*   ONSI0FWE 100*     CBC:   Recent Labs     09/26/21 0517   WBC 6.0   HGB 14.9   HCT 43.5   MCV 85.0      RBC 5.12*   MCH 29.1   MCHC 34.3   RDW 12.8     CRP:   No results for input(s): CRP in the last 72 hours. LDH:   No results for input(s): LDH in the last 72 hours. BMP:   Recent Labs     09/26/21 0517   *   K 3.9   CL 96*   CO2 20   BUN 29*   CREATININE 0.68   GLUCOSE 329*     Liver Function Test:   No results for input(s): PROT, LABALBU, ALT, AST, GGT, ALKPHOS, BILITOT in the last 72 hours. Coagulation Profile:   No results for input(s): INR, PROTIME, APTT in the last 72 hours. D-Dimer:  No results for input(s): DDIMER in the last 72 hours. Ferritin:    No results for input(s): FERRITIN in the last 72 hours. Lactic Acid:  No results for input(s): LACTA in the last 72 hours. Cardiac Enzymes:  No results for input(s): CKTOTAL, CKMB, CKMBINDEX, TROPONINI in the last 72 hours. Invalid input(s): TROPONIN, HSTROP  BNP/ProBNP:   No results for input(s): BNP, PROBNP in the last 72 hours. Triglycerides:  No results for input(s): TRIG in the last 72 hours.      Microbiology:  Urine Culture:  No components found for: CURINE  Blood Culture:  No components found for: CBLOOD, CFUNGUSBL  Sputum Culture:  No components found for: CSPUTUM  No results for input(s): SPECDESC, SPECIAL, CULTURE, STATUS, ORG, CDIFFTOXPCR, CAMPYLOBPCR, SALMONELLAPC, SHIGAPCR, SHIGELLAPCR, MPNEUG, MPNEUM, LACTOQL in the last 72 hours. No results for input(s): SPUTUM, SPECDESC, SPECIAL, CULTURE, STATUS, ORG, CDIFFTOXPCR, MPNEUM, MPNEUG in the last 72 hours. Invalid input(s): CURINE, CBLOOD, CFUNGUSBL     Pathology:    Radiology Reports:  XR CHEST (SINGLE VIEW FRONTAL)   Final Result   Bilateral diffuse pulmonary edema and or pneumonia without definite effusion. No cardiomegaly was identified. Slight improvement bilaterally has occurred from 09/21/2021. XR CHEST PORTABLE   Final Result   Multifocal airspace opacities worrisome multifocal atypical viral pneumonia. Low lung volumes              Echocardiogram:   No results found for this or any previous visit. ASSESSMENT AND PLAN     Assessment:    // Acute hypoxic respiratory failure  // Acute respiratory distress syndrome  // Bilateral multifocal pneumonia due to COVID 19 infection  // Diabetes mellitus.  // Morbid obesity  // Likely obstructive sleep apnea/hypoventilation  // Hypertension  // E. coli in urine    Plan:    - Saturating well on BiPAP 18/10 at 90%  - Using intermittent HFNC and BiPAP  - Encourage prone position when sleeping, incentive spirometry  - Encourage prone positioning when sleeping.  - Daily CRP: 9/21 - 205, 128, 47  - Tested positive:  9/17/21  - Symptom onset:  9/16/21  - Out of Isolation: No  - Vaccinated: No  - Remdesivir: 9/22  - Decadron: 9/21, 9/22, 9/23, 9/24, 9/25, 9/26, 9/27, 9/28,   - Actemra: 9/22/21  - Co-infection: No. Asymptomatic bacteruria E Coli in urine.  - Fluid Balance: +500ml  - Glycemic Control: Per primary team. Lantus increased to 55U BiD    Discussed with respiratory therapist treatment plan discussed  Discussed with nursing staff.     Vinh Narvaez MD  PGY-3, Internal Medicine Resident  [address] [facility] [place], [facility]         9/28/2021, 5:58 PM    This patient was evaluated in the context of the global SARS-CoV-2 (COVID-19) pandemic, which necessitated considerations that the patient either has COVID-19 infection or is at risk of infection with COVID-19. Institutional protocols and algorithms that pertain to the evaluation & management of patients with COVID-19 or those at risk for COVID-19 are in a state of rapid changes based on information released by regulatory bodies including the CDC and federal and state organizations. These policies and algorithms were followed during the patient's care. Please note that this chart was generated using voice recognition Dragon dictation software. Although every effort was made to ensure the accuracy of this automated transcription, some errors in transcription may have occurred. Attending Physician Statement  I have discussed the care of Addsion Perez, including pertinent history and exam findings with the resident. I have reviewed the key elements of all parts of the encounter with the resident. I have seen and examined the patient with the resident. I agree with the assessment and plan and status of the problem list as documented. I have seen the patient during my round today, I have reviewed the chart, labs seen, overnight events noted. Overnight she is afebrile. She is hemodynamically stable and remain on high flow nasal cannula and BiPAP/noninvasive ventilation. She continues to require 90% FiO2 and 50 L to 6 L of high flow nasal cannula she is most of the time saturating above 88% to 95%. She does require BiPAP and she used BiPAP at night 18/10 and FiO2 of 90% this morning she had used BiPAP intermittently also alternating with high flow.   She remained tachypneic and in moderate distress but able to talk she is able to go out of bed to bedside commode but does desaturate she does not have increased cough or increase or change in sputum denies chest pain and leg pain or hemoptysis. Appetite remain good. She had a blood gas done on BiPAP today shows pH of 7.52/PCO2 35/PO2 was 178 and a bicarb of 28 apparently 100% BiPAP  She is currently on Decadron blood sugar is high Lantus is being adjusted by primary service. We will continue with Decadron at current dose. Continue with high flow nasal cannula and will try to wean O2 very difficult at this time and will alternate with BiPAP and nocturnal BiPAP. Watch for signs of secondary infection WBC temperature. Will repeat chest x-ray tomorrow. She received 1 dose of Lasix today recommend to check labs and likely consider daily smaller dose of Lasix and adjust.    Discussed with nursing staff, treatment and plan discussed. Discussed with respiratory therapist.    Total critical care time caring for this patient with life threatening, unstable organ failure, including direct patient contact, management of life support systems, review of data including imaging and labs, discussions with other team members and physicians at least 27  Min so far today, excluding procedures. Please note that this chart was generated using voice recognition Dragon dictation software. Although every effort was made to ensure the accuracy of this automated transcription, some errors in transcription may have occurred.      Charu Aldrich MD  9/28/2021 7:10 PM

## 2021-09-28 NOTE — PLAN OF CARE
Problem: Airway Clearance - Ineffective  Goal: Achieve or maintain patent airway  9/28/2021 0346 by Emil Evans RN  Outcome: Ongoing  9/27/2021 1600 by Mateo Mohan RN  Outcome: Ongoing     Problem: Gas Exchange - Impaired  Goal: Absence of hypoxia  9/28/2021 0346 by Emil Evans RN  Outcome: Ongoing  9/27/2021 1600 by Mateo Mohan RN  Outcome: Ongoing  Goal: Promote optimal lung function  9/28/2021 0346 by Emil Evans RN  Outcome: Ongoing  9/27/2021 1600 by Mateo Mohan RN  Outcome: Ongoing     Problem: Breathing Pattern - Ineffective  Goal: Ability to achieve and maintain a regular respiratory rate  9/28/2021 0346 by Emil Evans RN  Outcome: Ongoing  9/27/2021 1600 by Mateo Mohan RN  Outcome: Ongoing     Problem:  Body Temperature -  Risk of, Imbalanced  Goal: Ability to maintain a body temperature within defined limits  9/28/2021 0346 by Emil Evans RN  Outcome: Ongoing  9/27/2021 1600 by Mateo Mohan RN  Outcome: Ongoing  Goal: Will regain or maintain usual level of consciousness  9/28/2021 0346 by Emil Evans RN  Outcome: Ongoing  9/27/2021 1600 by Mateo Mohan RN  Outcome: Ongoing  Goal: Complications related to the disease process, condition or treatment will be avoided or minimized  9/28/2021 0346 by Emil Evans RN  Outcome: Ongoing  9/27/2021 1600 by Mateo Mohan RN  Outcome: Ongoing     Problem: Isolation Precautions - Risk of Spread of Infection  Goal: Prevent transmission of infection  9/28/2021 0346 by Emil Evans RN  Outcome: Ongoing  9/27/2021 1600 by Mateo Mohan RN  Outcome: Ongoing     Problem: Nutrition Deficits  Goal: Optimize nutritional status  9/28/2021 0346 by Emil Evans RN  Outcome: Ongoing  9/27/2021 1600 by Mateo Mohan RN  Outcome: Ongoing     Problem: Risk for Fluid Volume Deficit  Goal: Maintain normal heart rhythm  9/28/2021 0346 by Emil Evans RN  Outcome: Ongoing  9/27/2021 1600 by Mateo Mohan RN  Outcome: Ongoing  Goal: Maintain absence of muscle cramping  9/28/2021 0346 by Hamilton Orona RN  Outcome: Ongoing  9/27/2021 1600 by Nile Malave RN  Outcome: Ongoing  Goal: Maintain normal serum potassium, sodium, calcium, phosphorus, and pH  9/28/2021 0346 by Hamilton Orona RN  Outcome: Ongoing  9/27/2021 1600 by Nile Malave RN  Outcome: Ongoing     Problem: Loneliness or Risk for Loneliness  Goal: Demonstrate positive use of time alone when socialization is not possible  9/28/2021 0346 by Hamilton Orona RN  Outcome: Ongoing  9/27/2021 1600 by Nile Malave RN  Outcome: Ongoing     Problem: Fatigue  Goal: Verbalize increase energy and improved vitality  9/28/2021 0346 by Hamilton Orona RN  Outcome: Ongoing  9/27/2021 1600 by Nile Malave RN  Outcome: Ongoing     Problem: Patient Education: Go to Patient Education Activity  Goal: Patient/Family Education  9/28/2021 0346 by Hamilton Orona RN  Outcome: Ongoing  9/27/2021 1600 by Nile Malave RN  Outcome: Ongoing     Problem: Falls - Risk of:  Goal: Will remain free from falls  Description: Will remain free from falls  9/28/2021 0346 by Hamilton Orona RN  Outcome: Ongoing  9/27/2021 1600 by Nile Malave RN  Outcome: Ongoing  Goal: Absence of physical injury  Description: Absence of physical injury  9/28/2021 0346 by Hamilton Orona RN  Outcome: Ongoing  9/27/2021 1600 by Nile Malave RN  Outcome: Ongoing

## 2021-09-28 NOTE — PLAN OF CARE
Problem: Airway Clearance - Ineffective  Goal: Achieve or maintain patent airway  Outcome: Ongoing     Problem: Gas Exchange - Impaired  Goal: Absence of hypoxia  9/28/2021 1946 by Jose Fagan RN  Outcome: Ongoing  9/28/2021 1031 by Veronica Griggs RCP  Outcome: Ongoing  Goal: Promote optimal lung function  9/28/2021 1946 by Jose Fagan RN  Outcome: Ongoing  9/28/2021 1031 by Veronica Griggs RCP  Outcome: Ongoing     Problem: Breathing Pattern - Ineffective  Goal: Ability to achieve and maintain a regular respiratory rate  9/28/2021 1946 by Jose Fagan RN  Outcome: Ongoing  9/28/2021 1031 by Veronica Griggs RCP  Outcome: Ongoing     Problem:  Body Temperature -  Risk of, Imbalanced  Goal: Ability to maintain a body temperature within defined limits  Outcome: Ongoing  Goal: Will regain or maintain usual level of consciousness  Outcome: Ongoing  Goal: Complications related to the disease process, condition or treatment will be avoided or minimized  Outcome: Ongoing     Problem: Isolation Precautions - Risk of Spread of Infection  Goal: Prevent transmission of infection  9/28/2021 1946 by Jose Fagan RN  Outcome: Ongoing  9/28/2021 1317 by Tarik Colvin RN  Outcome: Ongoing     Problem: Nutrition Deficits  Goal: Optimize nutritional status  Outcome: Ongoing     Problem: Risk for Fluid Volume Deficit  Goal: Maintain normal heart rhythm  Outcome: Ongoing  Goal: Maintain absence of muscle cramping  Outcome: Ongoing  Goal: Maintain normal serum potassium, sodium, calcium, phosphorus, and pH  Outcome: Ongoing     Problem: Loneliness or Risk for Loneliness  Goal: Demonstrate positive use of time alone when socialization is not possible  Outcome: Ongoing     Problem: Fatigue  Goal: Verbalize increase energy and improved vitality  Outcome: Ongoing     Problem: Patient Education: Go to Patient Education Activity  Goal: Patient/Family Education  Outcome: Ongoing     Problem: Falls - Risk of:  Goal: Will remain free from falls  Description: Will remain free from falls  Outcome: Ongoing  Goal: Absence of physical injury  Description: Absence of physical injury  Outcome: Ongoing     Problem: Nutrition  Goal: Optimal nutrition therapy  9/28/2021 1946 by Ramón Segura RN  Outcome: Ongoing  9/28/2021 1242 by Ligia Moss RD, LD  Outcome: Ongoing  Note: Nutrition Problem #1: Inadequate oral intake  Intervention: Food and/or Nutrient Delivery: Continue Current Diet, Start Oral Nutrition Supplement  Nutritional Goals: Pt to meet % of est'd needs via PO daily

## 2021-09-28 NOTE — PROGRESS NOTES
[facility]  Office: [address], DO, Ailin Isaacs, DO, Randallrigoberto Cotto, DO, Eleazarroxana Angel Blood, DO, Rigo Fink MD, Kvng Maya MD, Hermann Pisano MD, Yaw Kuo MD, Myriam Mcclendon MD, Wilma Tello MD, Avery Garrido MD, Mario Ortiz, DO, Mikayla Fitzpatrick, DO, Sol Fitch MD,  Enrique Joshi, DO, Norris Holter, MD, Sean Delgado MD, Anita Sanchez MD, Griselda Jesus MD, , Baylee Ross MD, Casey Moyer MD, Claudia Powell MD, Betty Zhang, Ryan Gao CNP, Tanesha Richard, CNP, Teddy Bailey, CNS, Peterson Casey, CNP, Jose Angel Serrano, CNP, Beth Naqvi, CNP, Kevin Lawrence, CNP, Vanessa Weinstein, CNP, Ralph Monsalve PA-C, [facility], Megan Kearney, CNP, Ayanna Campbell, CNP, Yu Davidson, CNP, Valentina Humphries, CNP, Angie Justin, CNP, Agapito Fan, CNP, Mary Alice Meier, CNP, Marvin Carmen, [address] [place] [address]    Progress Note    9/28/2021    11:23 AM    Name:   Sanjuana Costa  MRN:     [id number]     Acct:      [de-identified]   Room:   [address] [place] [address] Day:  7  Admit Date:  9/21/2021  8:43 PM    PCP:   Viktoria Rinne  Code Status:  Full Code    Subjective:     C/C:   Chief Complaint   Patient presents with   [facility] Other     COVID+    Shortness of Breath     Interval History Status: Improved    Pt seen and examined this morning. Transferred to the [facility] ICU overnight due to increasing oxygen requirements. Had desaturations on HFNC while eating breakfast this morning. States that she is feeling short of breath, but is better with the BIPAP present. Did not receive the vaccine because she felt it was \"rushed. \" ABG overnight revealed respiratory alkalosis with appropriate oxygen levels. Brief History:     Sanjuana Costa is a 45 y.o.  Non- / non  female who presents with Other (COVID+) and Shortness of Breath   and is admitted to the hospital for the management of Covid 19 Pneumonia    Patient presented to the emergency room for the concern of worsening COVID-19 Symptoms. Patient was originally seen Patient was originally diagnosed with the COVID 4 days prior. Patient states since diagnosis she has been having increased shortness of breath, cough  With water-like diarrhea. Upon arrival to the emergency room patient was noted to be hypoxic with saturations in the 80's  she required 6 lpm nc supplemental oxygen. Was seen by infectious disease, started on high-dose steroids, Actemra was given on 9/22/2021. Patient however continue to require more oxygen. She has been feeling fine for the past couple days but still requires 90% FiO2 to maintain oxygen of 91 to 90%. She still feels very winded with ambulation.      Did not receive the vaccine because she felt it was \"rushed. \"    - Uptrendning oxygen requirements prompting transfer to [facility] ICU overnight on 9/27-9/28. Review of Systems:     Constitutional:  negative for chills, fevers, sweats, + fatigue  Respiratory:  +cough, +dyspnea on exertion, +shortness of breath,  No wheezing  Cardiovascular:  negative for chest pain, chest pressure/discomfort, lower extremity edema, palpitations  Gastrointestinal:  negative for abdominal pain, constipation, diarrhea, nausea, vomiting  Neurological:  negative for dizziness, headache    Medications: Allergies:     Allergies   Allergen Reactions    Cephalexin      Gets yeast infection    Codeine        Current Meds:   Scheduled Meds:    furosemide  40 mg IntraVENous Once    insulin glargine  50 Units SubCUTAneous BID    vitamin C  500 mg Oral BID    fluticasone  1 spray Each Nostril Daily    cetirizine  10 mg Oral Daily    insulin lispro  0-18 Units SubCUTAneous TID WC    insulin lispro  0-9 Units SubCUTAneous Nightly    albuterol sulfate HFA  2 puff Inhalation 4x daily    And    ipratropium  2 puff Inhalation 4x daily    famotidine  20 mg Oral Daily    dexamethasone  10 mg IntraVENous Daily    aspirin  81 mg Oral Daily    ferrous sulfate  325 mg Oral Daily with breakfast    sodium chloride flush  5-40 mL IntraVENous 2 times per day    enoxaparin  30 mg SubCUTAneous BID    Vitamin D  2,000 Units Oral Daily     Continuous Infusions:    dextrose      sodium chloride 25 mL (21 1822)     PRN Meds: glucose, glucagon (rDNA), dextrose, ALPRAZolam, potassium chloride **OR** potassium alternative oral replacement **OR** potassium chloride, albuterol sulfate HFA, glucose, dextrose, glucagon (rDNA), magnesium sulfate, sodium phosphate IVPB **OR** sodium phosphate IVPB **OR** sodium phosphate IVPB, sodium chloride flush, sodium chloride, ondansetron **OR** ondansetron, magnesium hydroxide, acetaminophen **OR** acetaminophen, dextromethorphan-guaiFENesin    Data:     Past Medical History:   has a past medical history of Anemia, Chronic back pain, Diabetes mellitus ([address] H/O LEEP, Hypertension, and S/P endometrial ablation. Social History:   reports that she has never smoked. She has never used smokeless tobacco. She reports current alcohol use. She reports that she does not use drugs. Family History:   Family History   Problem Relation Age of Onset    Hypotension Mother     Heart Disease Father     Heart Failure Father        Vitals:  BP (!) 140/96   Pulse 84   Temp 98.1 °F (36.7 °C) (Axillary)   Resp (!) 35   Ht 5' 3\" (1.6 m)   Wt 257 lb 15 oz (117 kg)   SpO2 100%   BMI 45.69 kg/m²   Temp (24hrs), Av.5 °F (36.4 °C), Min:96.6 °F (35.9 °C), Max:98.2 °F (36.8 °C)    Recent Labs     21  1253 21  2042 21  0511 21  1004   POCGLU 249* 372* 223* 190*       I/O (24Hr):     Intake/Output Summary (Last 24 hours) at 2021 1123  Last data filed at 2021 1559  Gross per 24 hour   Intake    Output 300 ml   Net -300 ml       Labs:  Hematology:  Recent Labs     21  0517   WBC 6.0   RBC 5.12*   HGB 14.9   HCT 43.5   MCV 85.0   MCH 29.1   MCHC 34.3   RDW 12.8      MPV 10.2     Chemistry:  Recent Labs     09/26/21  0517   *   K 3.9   CL 96*   CO2 20   GLUCOSE 329*   BUN 29*   CREATININE 0.68   ANIONGAP 16   LABGLOM >60   GFRAA >60   CALCIUM 9.0     Recent Labs     09/26/21  1758 09/27/21  0819 09/27/21  1253 09/27/21  2042 09/28/21  0511 09/28/21  1004   POCGLU 174* 253* 249* 372* 223* 190*         Radiology:  XR CHEST PORTABLE    Result Date: 9/21/2021  Multifocal airspace opacities worrisome multifocal atypical viral pneumonia. Low lung volumes       Physical Examination:        General appearance:  alert, cooperative, ill-appearing, obese  female sitting up in bed. Mental Status:  oriented to person, place and time and normal affect  HEENT: BIPAP mask present, EOMI, sclera are anti-icteric  Lungs:  Tachypneic, diminished posteriorly, low inspiratory effort, on high flow O2 60 L at 95% FiO2  Heart:  regular rate and rhythm, no murmur  Abdomen: protuberant, soft, nontender, nondistended, normal bowel sounds, no masses, hepatomegaly, splenomegaly  Extremities:  no edema, redness, tenderness in the calves  Skin:  no gross lesions, rashes, induration    Assessment:        Hospital Problems         Last Modified POA    * (Principal) Pneumonia due to COVID-19 virus 9/28/2021 Yes    Sepsis due to COVID-19 [facility]) 9/28/2021 Yes    Acute respiratory failure with hypoxia ([facility] 9/28/2021 Yes    Essential hypertension 9/28/2021 Yes    Elevated C-reactive protein (CRP) 9/28/2021 Yes    Type 2 diabetes mellitus with hyperglycemia, with long-term current use of insulin ([facility] 9/28/2021 Yes    Obesity, morbid, BMI 40.0-49.9 ([facility] 9/28/2021 Yes          Plan:        COVID-19 viral pneumonia:   - Infectious disease following   - Worsening Oxygen requirments  - Received 5 days of Decadron 20 mg. Currently on day #2 of 10 mg Decadron  - Actemra received 9/22  - Vitamin C, vitamin D  - DVT ppx - Lovenox    Acute hypoxic respiratory failure:   - Uptrending oxygen requirement overnight. Now requiring significant Bipap therapy. - Now requiring Bipap continuously. % FiO2  - Pulmonology following  - Continue combivent scheduled  - IS/acapella  - Lasix 40 mg x 1 now     Sepsis from covid: Secondary to #1    Essential hypertension: Currently stable off of meds. Diabetes mellitus type 2:   - Continue Lantus to 50 units BID with ISS mealtime coverage. Morbid obesity (BMI 45.69): Weight loss encouraged    GI/DVT prophylaxis: Pepcid, Lovenox twice daily    Due to uptrending oxygen requirements, prognosis is guarded. Discussed possible need for intubation. Patient understands. Will try to prone/lie on side.     Labs reviewed  Imaging personally reviewed - CXR with diffuse bilateral hazy infiltrates    CEDRICK TRACY DO  9/28/2021  11:23 AM

## 2021-09-28 NOTE — PROGRESS NOTES
Patient is sitting up in high fowlers position and on biPAP, SpO2 now at 99%, RR 37 @ 100% O2. RN and RT at bedside RT about to perform Blood gas. Writer/RN monitoring closely.

## 2021-09-28 NOTE — PROGRESS NOTES
Occupational [facility] [employer]  Occupational Therapy Not Seen Note    DATE: 2021    NAME: Gin Raymundo  MRN: [id number]   : 1980      Patient not seen this date for Occupational Therapy due to:     Other: Pt not appropriate for OT this day, SPO2 74% supine in bed on hi-flow increasing to 84%    Next Scheduled Treatment: Attempt at later date    Electronically signed by NAZANIN Abbott on 2021 at 11:26 AM

## 2021-09-28 NOTE — PLAN OF CARE
Problem: Isolation Precautions - Risk of Spread of Infection  Goal: Prevent transmission of infection  9/28/2021 1317 by Tarik Colvin RN  Outcome: Ongoing  9/28/2021 0346 by Miguel Angel Scruggs RN  Outcome: Ongoing

## 2021-09-28 NOTE — PROGRESS NOTES
Laurent November, PPatient Assessment complete. Pneumonia due to COVID-19 virus [U07.1, J12.82]  COVID-19 [U07.1] . Vitals:    09/27/21 2336   BP:    Pulse:    Resp: (!) 33   Temp:    SpO2:    . Patients home meds are   Prior to Admission medications    Medication Sig Start Date End Date Taking?  Authorizing Provider   clobetasol (TEMOVATE) 0.05 % external solution Apply topically 3 times weekly to rash on scalp 6/12/20   Jamaal Aponte MD   Salicylic Acid 3 % SHAM Use 3-4 times weekly leave on for 5 minutes prior to washing off scalp 6/12/20   Jamaal Aponte MD   aspirin 81 MG tablet Take 81 mg by mouth daily    Historical Provider, MD   Probiotic Product (PROBIOTIC PO) Take by mouth    Historical Provider, MD   metFORMIN (GLUCOPHAGE) 500 MG tablet Take 500 mg by mouth 7/24/19   Historical Provider, MD   lisinopril (PRINIVIL;ZESTRIL) 2.5 MG tablet Take 2.5 mg by mouth 7/24/19   Historical Provider, MD   ferrous sulfate 325 (65 Fe) MG tablet Take 325 mg by mouth daily (with breakfast)    Historical Provider, MD   Multiple Vitamins-Minerals (WOMENS MULTIVITAMIN PO) Take by mouth    Historical Provider, MD   loperamide (IMODIUM) 2 MG capsule Take 2 mg by mouth 4 times daily as needed for Diarrhea    Historical Provider, MD   Assessment   Patient denies any history of COPD/Asthma, does not take any medications for their breathing at home, appears short of breath, claims the MDI's provide some relief, will continue QID  RR 28  Breath Sounds: Diminished     Bronchodilator assessment at level  3  [x]    Bronchodilator Assessment  BRONCHODILATOR ASSESSMENT SCORE  Score 0 1 2 3 4 5   Breath Sounds   []  Patient Baseline []  No Wheeze good aeration []  Faint, scattered wheezing, good aeration [x]  Expiratory Wheezing and or moderately diminished []  Insp/Exp wheeze and/or very diminished []  Insp/Exp and/ or marked distress   Respiratory Rate   []  Patient Baseline []  Less than 20 []  Less than 20 []  20-25 []  Greater than 25 [x]  Greater than 25   Peak flow % of Pred or PB [x]  NA   []  Greater than 90%  []  81-90% []  71-80% []  Less than or equal to 70%  or unable to perform []  Unable due to Respiratory Distress   Dyspnea re []  Patient Baseline []  No SOB []  No SOB []  SOB on exertion [x]  SOB min activity []  At rest/acute   e FEV% Predicted       [x]  NA []  Above 69%  []  Unable []  Above 60-69%  []  Unable []  Above 50-59%  []  Unable []  Above 35-49%  []  Unable []  Less than 35%  []  Unable          Ashlie Guillermo RCP  2:44 AM

## 2021-09-28 NOTE — PLAN OF CARE
Nutrition Problem #1: Inadequate oral intake  Intervention: Food and/or Nutrient Delivery: Continue Current Diet, Start Oral Nutrition Supplement  Nutritional Goals: Pt to meet % of est'd needs via PO daily

## 2021-09-29 NOTE — PROGRESS NOTES
[facility]  Office: [address], DO, Jeremy Bailey, DO, Telly Liz, DO, Taran Lopez, DO, Carlos Dumont MD, Guzman Winston MD, Ginger Reaves MD, Joseline Cooley MD, Joelle Colunga MD, Wes Blackman MD, Lori Davidson MD, Jacqui Kirby, DO, Wilfrido Castro DO, Marco Akhtar MD,  Austin Kearns DO, Julianne Jenkins MD, Mercy Enciso MD, Levi Ortiz MD, Jaswant Ceron MD, , Jovi Thornton MD, Krystal Razo MD, Denise Aguilera MD, Michelle Abarca, CNP, Osvaldo Queen, CNP, Blade Dave, CNS, Ancelmo High, CNP, Claude Leep, CNP, Myra Strong, CNP, Loree Hall, CNP, Ese Mcclendon, CNP, Chadwick Kelly PA-C, [facility], Rosibel Montelongo, CNP, Floresita Delacruz, CNP, Fozia Brock, CNP, Christian Vera, CNP, Georgina Briscoe, CNP, Seven Mohan, CNP, Kameron Rogel, [facility], Librado Perrin, [address] [place] [address]    Progress Note    9/29/2021    2:58 PM    Name:   Zohaib Gudino  MRN:     [id number]     Acct:      [de-identified]   Room:   [address] [place] [address] Day:  8  Admit Date:  9/21/2021  8:43 PM    PCP:   Flako De Los Santos  Code Status:  Full Code    Subjective:     C/C:   Chief Complaint   Patient presents with   [facility] Other     COVID+    Shortness of Breath     Interval History Status: Improved    Pt seen and examined this morning. No acute events overnight. Patient remains on BiPAP therapy. Chest x-ray worsened today. She is sitting up in bed and feels about the same. She believes that she was doing well with high flow nasal cannula. Respirations up. Vital signs are otherwise stable. Brief History:     Zohaib Gudino is a 45 y.o. Non- / non  female who presents with Other (COVID+) and Shortness of Breath   and is admitted to the hospital for the management of Covid 19 Pneumonia      Patient presented to the emergency room for the concern of worsening COVID-19 Symptoms. Patient was originally seen Patient was originally diagnosed with the COVID 4 days prior. Patient states since diagnosis she has been having increased shortness of breath, cough  With water-like diarrhea. Upon arrival to the emergency room patient was noted to be hypoxic with saturations in the 80's  she required 6 lpm nc supplemental oxygen. Was seen by infectious disease, started on high-dose steroids, Actemra was given on 9/22/2021. Patient however continue to require more oxygen. She has been feeling fine for the past couple days but still requires 90% FiO2 to maintain oxygen of 91 to 90%. She still feels very winded with ambulation.      Did not receive the vaccine because she felt it was \"rushed. \"    - Uptrendning oxygen requirements prompting transfer to [facility] ICU overnight on 9/27-9/28. Review of Systems:     Constitutional:  negative for chills, fevers, sweats, + fatigue  Respiratory:  +cough, +dyspnea on exertion, +shortness of breath,  No wheezing  Cardiovascular:  negative for chest pain, chest pressure/discomfort, lower extremity edema, palpitations  Gastrointestinal:  negative for abdominal pain, constipation, diarrhea, nausea, vomiting  Neurological:  negative for dizziness, headache    Medications: Allergies:     Allergies   Allergen Reactions    Cephalexin      Gets yeast infection    Codeine        Current Meds:   Scheduled Meds:    [START ON 9/30/2021] furosemide  20 mg IntraVENous Daily    insulin glargine  50 Units SubCUTAneous BID    vitamin C  500 mg Oral BID    fluticasone  1 spray Each Nostril Daily    cetirizine  10 mg Oral Daily    insulin lispro  0-18 Units SubCUTAneous TID WC    insulin lispro  0-9 Units SubCUTAneous Nightly    famotidine  20 mg Oral Daily    dexamethasone  10 mg IntraVENous Daily    aspirin  81 mg Oral Daily    ferrous sulfate  325 mg Oral Daily with breakfast    sodium chloride flush  5-40 mL IntraVENous 2 times per day    enoxaparin  30 mg SubCUTAneous BID    Vitamin D  2,000 Units Oral Daily     Continuous Infusions:    dextrose      sodium chloride 25 mL (21 1822)     PRN Meds: albuterol sulfate HFA **AND** ipratropium, dextromethorphan-guaiFENesin, glucose, glucagon (rDNA), dextrose, ALPRAZolam, potassium chloride **OR** potassium alternative oral replacement **OR** potassium chloride, albuterol sulfate HFA, glucose, dextrose, glucagon (rDNA), magnesium sulfate, sodium phosphate IVPB **OR** sodium phosphate IVPB **OR** sodium phosphate IVPB, sodium chloride flush, sodium chloride, ondansetron **OR** ondansetron, magnesium hydroxide, acetaminophen **OR** acetaminophen    Data:     Past Medical History:   has a past medical history of Anemia, Chronic back pain, Diabetes mellitus ([facility] H/O LEEP, Hypertension, Obesity, morbid, BMI 40.0-49.9 ([facility] and S/P endometrial ablation. Social History:   reports that she has never smoked. She has never used smokeless tobacco. She reports current alcohol use. She reports that she does not use drugs. Family History:   Family History   Problem Relation Age of Onset    Hypotension Mother     Heart Disease Father     Heart Failure Father        Vitals:  /78   Pulse 78   Temp 97.9 °F (36.6 °C) (Axillary)   Resp 25   Ht 5' 3\" (1.6 m)   Wt 257 lb 15 oz (117 kg)   SpO2 (!) 89%   BMI 45.69 kg/m²   Temp (24hrs), Av.8 °F (36.6 °C), Min:96.6 °F (35.9 °C), Max:98.2 °F (36.8 °C)    Recent Labs     21  1843 21  0905 21  1203   POCGLU 400* 368* 136* 260*       I/O (24Hr):     Intake/Output Summary (Last 24 hours) at 2021 1458  Last data filed at 2021 1339  Gross per 24 hour   Intake    Output 1000 ml   Net -1000 ml       Labs:  Hematology:  Recent Labs     21  0456   WBC 10.3   RBC 5.19*   HGB 15.5*   HCT 44.9   MCV 86.5   MCH 29.9   MCHC 34.5   RDW 12.9      MPV 10.8     Chemistry:  Recent Labs     21  0456      K 3.7   CL 98   CO2 27   GLUCOSE 173*   BUN 27*   CREATININE 0.66   ANIONGAP 13   LABGLOM >60   GFRAA >60   CALCIUM 8.8     Recent Labs     09/28/21  1004 09/28/21  1155 09/28/21  1843 09/28/21  2025 09/29/21  0905 09/29/21  1203   POCGLU 190* 215* 400* 368* 136* 260*         Radiology:  XR CHEST PORTABLE    Result Date: 9/21/2021  Multifocal airspace opacities worrisome multifocal atypical viral pneumonia. Low lung volumes       Physical Examination:        General appearance:  alert, cooperative, ill-appearing, obese  female sitting up in bed. Mental Status:  oriented to person, place and time and normal affect  HEENT: BIPAP mask present, EOMI, sclera are anti-icteric  Lungs: Tachypneic, diminished posteriorly, low inspiratory effort  Heart:  regular rate and rhythm, no murmur  Abdomen: protuberant, soft, nontender, nondistended, normal bowel sounds, no masses, hepatomegaly, splenomegaly  Extremities:  no edema, redness, tenderness in the calves  Skin:  no gross lesions, rashes, induration    Assessment:        Hospital Problems         Last Modified POA    * (Principal) Pneumonia due to COVID-19 virus 9/28/2021 Yes    Sepsis due to COVID-19 [facility]) 9/28/2021 Yes    Acute respiratory failure with hypoxia ([facility] 9/28/2021 Yes    Essential hypertension 9/28/2021 Yes    Elevated C-reactive protein (CRP) 9/28/2021 Yes    Type 2 diabetes mellitus with hyperglycemia, with long-term current use of insulin ([facility] 9/28/2021 Yes    Obesity, morbid, BMI 40.0-49.9 ([facility] 9/28/2021 Yes          Plan:        COVID-19 viral pneumonia:   - Infectious disease following   - Worsening Oxygen requirments  - Received 5 days of Decadron 20 mg. Currently on day #3 of 10 mg Decadron  - Actemra received 9/22  - Vitamin C, vitamin D  - DVT ppx - Lovenox    Acute hypoxic respiratory failure:   - Now requiring Bipap continuously.  % FiO2  - Pulmonology following  - Continue combivent scheduled  - IS/acapella  - Continue Lasix 20 mg daily Sepsis from covid: Secondary to #1    Essential hypertension: Currently stable off of meds. Diabetes mellitus type 2:   - Continue Lantus to 50 units BID with ISS mealtime coverage. Morbid obesity (BMI 45.69): Weight loss encouraged    GI/DVT prophylaxis: Pepcid, Lovenox twice daily    Due to uptrending oxygen requirements, prognosis is guarded. Discussed possible need for intubation. Patient understands. Will try to prone/lie on side. Labs reviewed  Imaging personally reviewed - CXR with diffuse bilateral hazy infiltrates. Slightly worse today. Continue slow oxygen wean. Patient is hopefully plateauing now.         [address] DO  9/29/2021  2:58 PM

## 2021-09-29 NOTE — PLAN OF CARE
Problem: OXYGENATION/RESPIRATORY FUNCTION  Goal: Patient will maintain patent airway  Outcome: Ongoing  Goal: Patient will achieve/maintain normal respiratory rate/effort  Description: Respiratory rate and effort will be within normal limits for the patient  Outcome: Ongoing     Problem: RESPIRATORY  Intervention: Respiratory assessment  9/29/2021 0830 by Moreno Roblero RCP  Note: NON INVASIVE VENTILATION  PROVIDE OPTIMAL VENTILATION/ACCEPTABLE SP02  IMPLEMENT NON INVASIVE VENTILATION PROTOCOL  ASSESSMENT SKIN INTEGRITY  PATIENT EDUCATION AS NEEDED  BIPAP AS NEEDED

## 2021-09-29 NOTE — PROGRESS NOTES
PULMONARY & CRITICAL CARE MEDICINE PROGRESS NOTE     Patient:  Ngoc Conde  MRN: [id number]  Admit date: 9/21/2021  Primary Care Physician: Dorys Hanson  Consulting Physician: Howie Guo DO  CODE Status: Full Code  LOS: 8     SUBJECTIVE     CHIEF COMPLAINT/REASON FOR INITIAL CONSULT:   Acute respiratory failure/COVID-19 pneumonia    BRIEF HOSPITAL COURSE:   The patient is a 45 y.o. female history of diabetes mellitus, hypertension, morbid obesity  unvaccinated was admitted with shortness of breath and diagnosed with COVID-19 pneumonia. She apparently was diagnosed about 4 days ago. There was associated cough that is mostly dry and also having some diarrhea. Patient required increasing oxygen with initial oxygen saturation being in the 80s. No previous history of lung disease  Patient is a non-smoker    INTERVAL HISTORY:  09/29/21  Pt was seen and examined at bedside. Resting comfortably in the bed. Saturating well on BiPAP 18/10 at 90%  Using intermittent HFNC and BiPAP  Blood gases 7.522/35/178/28  Vitals stable. Labs not done today  No acute events overnight. REVIEW OF SYSTEMS:  Review of Systems   Constitutional: Positive for fatigue. Negative for appetite change and fever. HENT: Negative for postnasal drip, rhinorrhea, sore throat, trouble swallowing and voice change. Eyes: Negative for redness and visual disturbance. Respiratory: Positive for cough and shortness of breath. Negative for wheezing. Cardiovascular: Negative for chest pain, palpitations and leg swelling. Gastrointestinal: Negative for abdominal pain, constipation, diarrhea, nausea and vomiting. Endocrine: Negative for polyuria. Genitourinary: Negative for dysuria, frequency, hematuria and urgency. Musculoskeletal: Negative. Allergic/Immunologic: Negative. Neurological: Negative for dizziness, syncope, speech difficulty and headaches. Hematological: Negative for adenopathy.  Does not bruise/bleed easily. Psychiatric/Behavioral: Negative. OBJECTIVE     PaO2/FiO2 RATIO:  Recent Labs     21  0511   POCPO2 178.4*      FiO2 : 94 %     VITAL SIGNS:   LAST:  /78   Pulse 78   Temp 97.9 °F (36.6 °C) (Axillary)   Resp 25   Ht 5' 3\" (1.6 m)   Wt 257 lb 15 oz (117 kg)   SpO2 (!) 89%   BMI 45.69 kg/m²   8-24 HR RANGE:  TEMP Temp  Av.8 °F (36.6 °C)  Min: 96.6 °F (35.9 °C)  Max: 98.2 °F (08.6 °C)   BP Systolic (75YWH), JSR:940 , Min:100 , DGC:688      Diastolic (73XKZ), FCQ:38, Min:65, Max:106     PULSE Pulse  Av.3  Min: 60  Max: 105   RR Resp  Av.9  Min: 22  Max: 33   O2 SAT SpO2  Av.4 %  Min: 89 %  Max: 96 %   OXYGEN DELIVERY O2 Flow Rate (L/min)  Av L/min  Min: 50 L/min  Max: 600 L/min        SYSTEMIC EXAMINATION:   General appearance - Ill-appearing, mild to moderate respiratory distress  Mental status - awake & alert, follows commands  Eyes - pupils equal and reactive, sclera anicteric  Mouth - mucous membranes moist, pharynx normal without lesions. Large tongue Mallampati 2  Neck -Short thick neck supple, no significant adenopathy, carotids upstroke normal bilaterally, no bruits  Chest - There is no intercostal recession or use of accessory muscles. Breath sounds bilaterally were dimnished to auscultation at bases. There were mild crackles present at bases. No expiratory wheezing and no rhonchi  Heart - normal rate, regular rhythm, normal S1, S2, no murmurs, rubs, clicks or gallops  Abdomen - soft, nontender, nondistended, no masses or organomegaly  Neurological - non-focal  Extremities - peripheral pulses normal, mild bilateral pedal edema, no clubbing or cyanosis.   No calf tenderness  Skin - normal coloration and turgor, no rashes, no suspicious skin lesions noted     DATA REVIEW     Medications: Current Inpatient  Scheduled Meds:   insulin glargine  50 Units SubCUTAneous BID    vitamin C  500 mg Oral BID    fluticasone  1 spray Each Nostril Daily    cetirizine  10 mg Oral Daily    insulin lispro  0-18 Units SubCUTAneous TID     insulin lispro  0-9 Units SubCUTAneous Nightly    famotidine  20 mg Oral Daily    dexamethasone  10 mg IntraVENous Daily    aspirin  81 mg Oral Daily    ferrous sulfate  325 mg Oral Daily with breakfast    sodium chloride flush  5-40 mL IntraVENous 2 times per day    enoxaparin  30 mg SubCUTAneous BID    Vitamin D  2,000 Units Oral Daily     Continuous Infusions:   dextrose      sodium chloride 25 mL (09/22/21 1822)       INPUT/OUTPUT:  In: -   Out: 1000 [Urine:1000]  Date 09/29/21 0000 - 09/29/21 2359   Shift 8796-6293 7563-0141 2203-7512 24 Hour Total   INTAKE   Shift Total(mL/kg)       OUTPUT   Urine(mL/kg/hr)  1000  1000   Shift Total(mL/kg)  1000(8.5)  1000(8.5)   Weight (kg) 117 117 117 117        LABS:  ABGs:   Recent Labs     09/28/21  0511   POCPH 7.522*   POCPCO2 35.0   POCPO2 178.4*   POCHCO3 28.7*   BZFF4SMZ 100*     CBC:   Recent Labs     09/29/21  0456   WBC 10.3   HGB 15.5*   HCT 44.9   MCV 86.5      LYMPHOPCT 10*   RBC 5.19*   MCH 29.9   MCHC 34.5   RDW 12.9     CRP:   No results for input(s): CRP in the last 72 hours. LDH:   No results for input(s): LDH in the last 72 hours. BMP:   Recent Labs     09/29/21  0456      K 3.7   CL 98   CO2 27   BUN 27*   CREATININE 0.66   GLUCOSE 173*     Liver Function Test:   No results for input(s): PROT, LABALBU, ALT, AST, GGT, ALKPHOS, BILITOT in the last 72 hours. Coagulation Profile:   No results for input(s): INR, PROTIME, APTT in the last 72 hours. D-Dimer:  No results for input(s): DDIMER in the last 72 hours. Ferritin:    No results for input(s): FERRITIN in the last 72 hours. Lactic Acid:  No results for input(s): LACTA in the last 72 hours. Cardiac Enzymes:  No results for input(s): CKTOTAL, CKMB, CKMBINDEX, TROPONINI in the last 72 hours.     Invalid input(s): TROPONIN, HSTROP  BNP/ProBNP:   No results for input(s): BNP, PROBNP in the last 72 hours. Triglycerides:  No results for input(s): TRIG in the last 72 hours. Microbiology:  Urine Culture:  No components found for: CURINE  Blood Culture:  No components found for: CBLOOD, CFUNGUSBL  Sputum Culture:  No components found for: CSPUTUM  No results for input(s): SPECDESC, SPECIAL, CULTURE, STATUS, ORG, CDIFFTOXPCR, CAMPYLOBPCR, SALMONELLAPC, SHIGAPCR, SHIGELLAPCR, MPNEUG, MPNEUM, LACTOQL in the last 72 hours. No results for input(s): SPUTUM, SPECDESC, SPECIAL, CULTURE, STATUS, ORG, CDIFFTOXPCR, MPNEUM, MPNEUG in the last 72 hours. Invalid input(s): CURINE, CBLOOD, CFUNGUSBL     Pathology:    Radiology Reports:  XR CHEST PORTABLE   Final Result   Limited exam, findings suggest progression in extensive bilateral coarse   alveolar infiltrates suggesting progression in multifocal pneumonia in COVID   positive patient         XR CHEST (SINGLE VIEW FRONTAL)   Final Result   Bilateral diffuse pulmonary edema and or pneumonia without definite effusion. No cardiomegaly was identified. Slight improvement bilaterally has occurred from 09/21/2021. XR CHEST PORTABLE   Final Result   Multifocal airspace opacities worrisome multifocal atypical viral pneumonia. Low lung volumes              Echocardiogram:   No results found for this or any previous visit.        ASSESSMENT AND PLAN     Assessment:    // Acute hypoxic respiratory failure  // Acute respiratory distress syndrome  // Bilateral multifocal pneumonia due to COVID 19 infection  // Diabetes mellitus.  // Morbid obesity  // Likely obstructive sleep apnea/hypoventilation  // Hypertension  // E. coli in urine    Plan:    - Saturating well on HFNC 60L at 95%  - Using intermittent HFNC and BiPAP  - Encourage prone position when sleeping, incentive spirometry  - Encourage prone positioning when sleeping.  - Daily CRP: 9/21 - 205, 128, 47  - Tested positive:  9/17/21  - Symptom onset:  9/16/21  - Out of Isolation: No  - Vaccinated: No  - Remdesivir: 9/22  - Decadron: 9/21, 9/22, 9/23, 9/24, 9/25, 9/26, 9/27, 9/28, 9/29  - Actemra: 9/22/21  - Co-infection: No. Asymptomatic bacteruria E Coli in urine.  - Fluid Balance: euvolemic  - Glycemic Control: Per primary team.     Discussed with respiratory therapist treatment plan discussed  Discussed with nursing staff. Bessy Chi MD  PGY-3, Internal Medicine Resident  [facility]         9/29/2021, 2:39 PM    This patient was evaluated in the context of the global SARS-CoV-2 (COVID-19) pandemic, which necessitated considerations that the patient either has COVID-19 infection or is at risk of infection with COVID-19. Institutional protocols and algorithms that pertain to the evaluation & management of patients with COVID-19 or those at risk for COVID-19 are in a state of rapid changes based on information released by regulatory bodies including the CDC and federal and state organizations. These policies and algorithms were followed during the patient's care. Please note that this chart was generated using voice recognition Dragon dictation software. Although every effort was made to ensure the accuracy of this automated transcription, some errors in transcription may have occurred. Attending Physician Statement  I have discussed the care of [place], including pertinent history and exam findings with the resident. I have reviewed the key elements of all parts of the encounter with the resident. I have seen and examined the patient with the resident. I agree with the assessment and plan and status of the problem list as documented. I seen the patient during my round today, chart reviewed, labs and medications reviewed. She remained in ICU status since yesterday and remains on high flow nasal cannula during the daytime yesterday with intermittent BiPAP overnight she had used BiPAP 18/10 and 90% FiO2.   She was tried on high flow nasal cannula this morning and she use it only for 20 to 30 minutes when she was trying to eat but she was desaturating and had to be placed back on BiPAP and when I saw her she was on BiPAP she does complain of shortness of breath or cough is better she denies any improvement in her shortness of breath though she has not been able to out of bed to commode today. She was alert and awake with moderate distress and tachypneic on BiPAP. She received 1 dose of Lasix 40 mg yesterday urine output was reported to be 1050. Her chest x-ray shows worsening bilateral infiltrate especially the lower lung consolidation infiltrate has been worse. She is on Decadron 10 mg now DVT prophylaxis with 30 twice daily. She does have improvement in CRP but clinically she has worsening hypoxia followed by infectious disease may need to consider empiric antibiotic therapy. We will give her Lasix 20 mg now and likely will need daily Lasix with monitoring of her renal function although with Lasix yesterday she did not have improvement. We will continue with BiPAP during the daytime and will try to give her a small breaks in between and use BiPAP every night. Continue ICU status high risk for intubation and requirement of ventilatory support. Discussed with nursing staff, treatment and plan discussed. Discussed with respiratory therapist.    Total critical care time caring for this patient with life threatening, unstable organ failure, including direct patient contact, management of life support systems, review of data including imaging and labs, discussions with other team members and physicians at least 35 min so far today, excluding procedures. Please note that this chart was generated using voice recognition Dragon dictation software. Although every effort was made to ensure the accuracy of this automated transcription, some errors in transcription may have occurred.      Imelda Dean MD  9/29/2021 6:13 PM

## 2021-09-29 NOTE — PROGRESS NOTES
[facility] -   Infectious diseases evaluation  admission date 9/21/2021    reason for consultation:   COVID    Impression :   Current:    Covid, pneumonia  Increase inflammatory markers    ·   Discussion / summary of stay / plan of care   ·   Recommendations   · Took a dose of Actemra  · On 10 mg Decadron  · Anticoagulation  · Follow clinical response, so far has not responded clinically yet, despite CRP improvement    Infection Control Recommendations   · [place] Precautions  · Contact Isolation   · Airborne isolation  · Droplet Isolation      Antimicrobial Stewardship Recommendations   · Of antibiotics    Coordination ofOutpatient Care:   · Estimated Length of IV antimicrobials:  · Patient will need Midline / picc Catheter Insertion:   · Patient will need SNF:  · Patient will need outpatient wound care:     History of Present Illness:   Initial history:  Dianne Pacheco is a 45y.o.-year-old female     Interval changes  9/29/2021   Patient Vitals for the past 8 hrs:   BP Temp Temp src Pulse Resp SpO2   09/29/21 0900    91 (!) 31 94 %   09/29/21 0834     22 93 %   09/29/21 0829     (!) 33    09/29/21 0800 (!) 131/106 98.2 °F (36.8 °C) Oral 67 25 94 %   09/29/21 0600 129/87   62 27 98 %   09/29/21 0500 127/79   64  97 %   09/29/21 0400 126/88   65 26 96 %   09/29/21 0340     27    09/29/21 0300 119/81   65  99 %   09/29/21 0230    60  92 %       Poor turnaround overnight and she is now on 80% high flow alternating with 100%. She is eating she has no fever, she had taken Actemra, a little depressed, was encouraged on the bedside    9/29: Labs within range, chest x-ray showing progression, the patient is on higher oxygenation, 90% BiPAP and saturating 90% only. Abdomen soft nontender otherwise.   Alert    -128-47      Allergies:   Cephalexin and Codeine     Review of Systems:     Review of Systems   Constitutional: Negative for activity change. HENT: Negative for congestion. Respiratory: Positive for shortness of breath. Cardiovascular: Negative for chest pain. Gastrointestinal: Negative for abdominal distention. Endocrine: Negative for heat intolerance. Genitourinary: Negative for dysuria, flank pain and frequency. Musculoskeletal: Negative for arthralgias. Skin: Negative for color change. Allergic/Immunologic: Negative for immunocompromised state. Neurological: Negative for dizziness. Hematological: Negative for adenopathy. Psychiatric/Behavioral: Negative for agitation. Physical Examination :       Physical Exam  Constitutional:       General: She is not in acute distress. Appearance: Normal appearance. She is ill-appearing. HENT:      Head: Normocephalic and atraumatic. Nose: Nose normal.   Eyes:      General: No scleral icterus. Conjunctiva/sclera: Conjunctivae normal.      Pupils: Pupils are equal, round, and reactive to light. Cardiovascular:      Rate and Rhythm: Normal rate and regular rhythm. Heart sounds: No murmur heard. No friction rub. Pulmonary:      Effort: No respiratory distress. Breath sounds: Normal breath sounds. Abdominal:      General: There is no distension. Palpations: Abdomen is soft. Tenderness: There is no abdominal tenderness. Genitourinary:     Comments: No hernandez  Musculoskeletal:         General: No swelling, tenderness or deformity. Cervical back: Neck supple. No rigidity or tenderness. Skin:     General: Skin is dry. Coloration: Skin is not jaundiced. Neurological:      General: No focal deficit present. Mental Status: She is alert and oriented to person, place, and time.    Psychiatric:         Mood and Affect: Mood normal.         Behavior: Behavior normal.         Past Medical History:     Past Medical History:   Diagnosis Date    Anemia     Chronic back pain     Diabetes mellitus ([facility] [address]     H/O LEEP  Hypertension     Obesity, morbid, BMI 40.0-49.9 ([facility] [address] 9/28/2021    S/P endometrial ablation        Past Surgical  History:     Past Surgical History:   Procedure Laterality Date    ENDOMETRIAL ABLATION      LEEP         Medications:      insulin glargine  50 Units SubCUTAneous BID    vitamin C  500 mg Oral BID    fluticasone  1 spray Each Nostril Daily    cetirizine  10 mg Oral Daily    insulin lispro  0-18 Units SubCUTAneous TID WC    insulin lispro  0-9 Units SubCUTAneous Nightly    famotidine  20 mg Oral Daily    dexamethasone  10 mg IntraVENous Daily    aspirin  81 mg Oral Daily    ferrous sulfate  325 mg Oral Daily with breakfast    sodium chloride flush  5-40 mL IntraVENous 2 times per day    enoxaparin  30 mg SubCUTAneous BID    Vitamin D  2,000 Units Oral Daily       Social History:     Social History     Socioeconomic History    Marital status: Single     Spouse name: Not on file    Number of children: Not on file    Years of education: Not on file    Highest education level: Not on file   Occupational History    Not on file   Tobacco Use    Smoking status: Never Smoker    Smokeless tobacco: Never Used   Substance and Sexual Activity    Alcohol use: Yes     Comment: occa.  Drug use: No    Sexual activity: Not on file   Other Topics Concern    Not on file   Social History Narrative    Not on file     Social Determinants of Health     Financial Resource Strain:     Difficulty of Paying Living Expenses:    Food Insecurity:     Worried About Running Out of Food in the Last Year:     [address] in the Last Year:    Transportation Needs:     Lack of Transportation (Medical):      Lack of Transportation (Non-Medical):    Physical Activity:     Days of Exercise per Week:     Minutes of Exercise per Session:    Stress:     Feeling of Stress :    Social Connections:     Frequency of Communication with Friends and Family:     Frequency of Social Gatherings with Friends and Family:     Attends [employer] Services:     Active Member of Clubs or Organizations:     Attends Club or Organization Meetings:     Marital Status:    Intimate Partner Violence:     Fear of Current or Ex-Partner:     Emotionally Abused:     Physically Abused:     Sexually Abused:        Family History:     Family History   Problem Relation Age of Onset    Hypotension Mother     Heart Disease Father     Heart Failure Father       Medical Decision Making:   I have independently reviewed/ordered the following labs:    CBC with Differential:   Recent Labs     09/29/21 0456   WBC 10.3   HGB 15.5*   HCT 44.9      LYMPHOPCT 10*   MONOPCT 3     BMP:  Recent Labs     09/29/21  0456      K 3.7   CL 98   CO2 27   BUN 27*   CREATININE 0.66     Hepatic Function Panel: No results for input(s): PROT, LABALBU, BILIDIR, IBILI, BILITOT, ALKPHOS, ALT, AST in the last 72 hours. No results for input(s): RPR in the last 72 hours. No results for input(s): HIV in the last 72 hours. No results for input(s): BC in the last 72 hours. Lab Results   Component Value Date    CREATININE 0.66 09/29/2021    GLUCOSE 173 09/29/2021       Detailed results: Thank you for allowing us to participate in the care of this patient. Please call with questions. This note is created with the assistance of a speech recognition program.  While intending to generate adocument that actually reflects the content of the visit, the document can still have some errors including those of syntax and sound a like substitutions which may escape proof reading. It such instances, actual meaningcan be extrapolated by contextual diversion.     Surya Gray MD  Office: [phone]  Perfect serve / office [phone]

## 2021-09-29 NOTE — PLAN OF CARE
Educated patient on pain scale for assessing level of pain, the need to notify a [facility] provider of episodes of pain, pharmacologic/non-pharmacologic pain management,and potential side effects of prescribed medications. Educate patient on safety precautions and fall prevention measures. Bed/Chair alarms remain on. Falling star in place. Fall sticker on ID band. Non-slip socks on. Clutter free environment. Inspect skin daily. Elevate heels off of the bed at all times. Turn/reposition every 2 hours. Mepilex to coccyx for prevention and assess every shift. Compression therapy to bilateral legs. Educate on how to cough and deep breathe, dyspnea management, and optimal breathing techniques. Give incentive spirometer, instruct how to properly use it, and educate benefits of using. Educate patient on cardiac monitoring, information regarding decreased cardiac output, energy conservation techniques and activity level. Maintain telemetry and Spo2 monitoring. Check vitals every 4 hrs and PRN.

## 2021-09-29 NOTE — PROGRESS NOTES
[facility] -   Infectious diseases evaluation  admission date 9/21/2021    reason for consultation:   COVID    Impression :   Current:    Covid, pneumonia  Increase inflammatory markers    ·   Discussion / summary of stay / plan of care   ·   Recommendations   · Took a dose of Actemra  · On 10 mg Decadron  · Anticoagulation  · Follow clinical response    Infection Control Recommendations   · [place] Precautions  · Contact Isolation   · Airborne isolation  · Droplet Isolation      Antimicrobial Stewardship Recommendations   · Of antibiotics    Coordination ofOutpatient Care:   · Estimated Length of IV antimicrobials:  · Patient will need Midline / picc Catheter Insertion:   · Patient will need SNF:  · Patient will need outpatient wound care:     History of Present Illness:   Initial history:  Kenia Hawkins is a 45y.o.-year-old female     Interval changes  9/28/2021   Patient Vitals for the past 8 hrs:   BP Temp Temp src Pulse Resp SpO2   09/28/21 2007      (!) 89 %   09/28/21 1945    92 (!) 36 (!) 89 %   09/28/21 1930    89 (!) 31 90 %   09/28/21 1915    85 24 (!) 89 %   09/28/21 1900 127/70   105 (!) 31 (!) 88 %   09/28/21 1800    89 (!) 34 (!) 87 %   09/28/21 1700    93 (!) 32 91 %   09/28/21 1600    74 30 94 %   09/28/21 1548     (!) 34 90 %   09/28/21 1520  96.6 °F (35.9 °C) Axillary      09/28/21 1500    80 30 96 %       Poor turnaround overnight and she is now on 80% high flow alternating with 100%. She is eating she has no fever, she had taken Actemra, a little depressed, was encouraged on the bedside    I have personally reviewed the past medical history, past surgical history, medications, social history, and family history, and I haveupdated the database accordingly. Allergies:   Cephalexin and Codeine     Review of Systems:     Review of Systems   Constitutional: Negative for activity change.    HENT: Negative for congestion. Respiratory: Positive for shortness of breath. Cardiovascular: Negative for chest pain. Gastrointestinal: Negative for abdominal distention. Endocrine: Negative for heat intolerance. Genitourinary: Negative for dysuria. Musculoskeletal: Negative for arthralgias. Skin: Negative for color change. Allergic/Immunologic: Negative for immunocompromised state. Neurological: Negative for dizziness. Hematological: Negative for adenopathy. Psychiatric/Behavioral: Negative for agitation. Physical Examination :       Physical Exam  Constitutional:       General: She is not in acute distress. Appearance: Normal appearance. She is ill-appearing. HENT:      Head: Normocephalic and atraumatic. Nose: Nose normal.   Eyes:      General: No scleral icterus. Conjunctiva/sclera: Conjunctivae normal.      Pupils: Pupils are equal, round, and reactive to light. Cardiovascular:      Rate and Rhythm: Normal rate and regular rhythm. Heart sounds: No murmur heard. No friction rub. Pulmonary:      Effort: No respiratory distress. Breath sounds: Normal breath sounds. Abdominal:      General: There is no distension. Palpations: Abdomen is soft. Tenderness: There is no abdominal tenderness. Genitourinary:     Comments: No hernandez  Musculoskeletal:         General: No swelling or tenderness. Cervical back: Neck supple. No rigidity. Skin:     General: Skin is dry. Coloration: Skin is not jaundiced. Neurological:      General: No focal deficit present. Mental Status: She is alert and oriented to person, place, and time.    Psychiatric:         Mood and Affect: Mood normal.         Behavior: Behavior normal.         Past Medical History:     Past Medical History:   Diagnosis Date    Anemia     Chronic back pain     [facility]     H/O LEEP     Hypertension     Obesity, morbid, BMI 40.0-49.9 ([facility] 9/28/2021    S/P endometrial ablation        Past Surgical  History:     Past Surgical History:   Procedure Laterality Date    ENDOMETRIAL ABLATION      LEEP         Medications:      insulin glargine  50 Units SubCUTAneous BID    vitamin C  500 mg Oral BID    fluticasone  1 spray Each Nostril Daily    cetirizine  10 mg Oral Daily    insulin lispro  0-18 Units SubCUTAneous TID WC    insulin lispro  0-9 Units SubCUTAneous Nightly    famotidine  20 mg Oral Daily    dexamethasone  10 mg IntraVENous Daily    aspirin  81 mg Oral Daily    ferrous sulfate  325 mg Oral Daily with breakfast    sodium chloride flush  5-40 mL IntraVENous 2 times per day    enoxaparin  30 mg SubCUTAneous BID    Vitamin D  2,000 Units Oral Daily       Social History:     Social History     Socioeconomic History    Marital status: Single     Spouse name: Not on file    Number of children: Not on file    Years of education: Not on file    Highest education level: Not on file   Occupational History    Not on file   Tobacco Use    Smoking status: Never Smoker    Smokeless tobacco: Never Used   Substance and Sexual Activity    Alcohol use: Yes     Comment: renetta.  Drug use: No    Sexual activity: Not on file   Other Topics Concern    Not on file   Social History Narrative    Not on file     Social Determinants of Health     Financial Resource Strain:     Difficulty of Paying Living Expenses:    Food Insecurity:     Worried About Running Out of Food in the Last Year:     [address] in the Last Year:    Transportation Needs:     Lack of Transportation (Medical):      Lack of Transportation (Non-Medical):    Physical Activity:     Days of Exercise per Week:     Minutes of Exercise per Session:    Stress:     Feeling of Stress :    Social Connections:     Frequency of Communication with Friends and Family:     Frequency of Social Gatherings with Friends and Family:     Attends Oriental orthodox Services:     Active Member of Clubs or Organizations:     Attends Club or Organization Meetings:     Marital Status:    Intimate Partner Violence:     Fear of Current or Ex-Partner:     Emotionally Abused:     Physically Abused:     Sexually Abused:        Family History:     Family History   Problem Relation Age of Onset    Hypotension Mother     Heart Disease Father     Heart Failure Father       Medical Decision Making:   I have independently reviewed/ordered the following labs:    CBC with Differential:   Recent Labs     09/26/21  0517   WBC 6.0   HGB 14.9   HCT 43.5        BMP:  Recent Labs     09/26/21  0517   *   K 3.9   CL 96*   CO2 20   BUN 29*   CREATININE 0.68     Hepatic Function Panel: No results for input(s): PROT, LABALBU, BILIDIR, IBILI, BILITOT, ALKPHOS, ALT, AST in the last 72 hours. No results for input(s): RPR in the last 72 hours. No results for input(s): HIV in the last 72 hours. No results for input(s): BC in the last 72 hours. Lab Results   Component Value Date    CREATININE 0.68 09/26/2021    GLUCOSE 329 09/26/2021       Detailed results: Thank you for allowing us to participate in the care of this patient. Please call with questions. This note is created with the assistance of a speech recognition program.  While intending to generate adocument that actually reflects the content of the visit, the document can still have some errors including those of syntax and sound a like substitutions which may escape proof reading. It such instances, actual meaningcan be extrapolated by contextual diversion.     Nadeen Banegas MD  Office: [phone]  Perfect serve / office [phone]

## 2021-09-29 NOTE — PROGRESS NOTES
[facility]up[facility] [address] [facility]  Occupational Therapy Not Seen Note    DATE: 2021    NAME: Isaac Sesay  MRN: [id number]   : 1980      Patient not seen this date for Occupational Therapy due to: Other: Per PTA pt declining any EOB or OOB activity d/t fear of SPO2 desaturation.  Pt SPO2 91% supine in bed on hi-flow    Next Scheduled Treatment: Attempt at later date    Electronically signed by NAZANIN Calvin on 2021 at 2:15 PM

## 2021-09-29 NOTE — PLAN OF CARE
Problem: Loneliness or Risk for Loneliness  Goal: Demonstrate positive use of time alone when socialization is not possible  9/29/2021 1328 by Rashmi Muhammad RN  Outcome: Ongoing  9/29/2021 0627 by Lion Pinedo RN  Outcome: Ongoing     Problem: Fatigue  Goal: Verbalize increase energy and improved vitality  9/29/2021 1328 by Rashmi Muhammad RN  Outcome: Ongoing  9/29/2021 0627 by Lion Pinedo RN  Outcome: Ongoing

## 2021-09-29 NOTE — PROGRESS NOTES
Physical Therapy  DATE: 2021  NAME: Leonel Harden  MRN: [id number]   : 1980    Discharge Recommendations: Continue to Assess (pending progress)     Subjective: RN agreeable to activity ast tolerated. Pt alert in bed upon arrival , On BiPAP, SPO2 at 98%. Pt reports feeling better and that she would not want to work too hard to drop her SPO2 levels. Agreeable to supine exercises this date. Pain: denies  Patient follows: All Commands  Is patient on ventilator: No ( on BiPAP  Is patient on sedation: No  Precautions: Droplet Plus. General.    Therapeutic exercises:  AROM x15 reps to BLE all planes. Education provided on benefits of therapeutic exercises for strengthening and improve ROM also to maintain joint intergrity. Pt advice to perform HEP 2-3 x/daily  Bilat gastrocnemius stretching 2 reps x 30 seconds    Goals  Short Term Goals  Short term goal 1: Perform bed mobility and functional transfers independently  Short term goal 2: Ambulate 300ft without AD and supervision  Short term goal 3: Participate in 30 minutes of therapy with SPO2 >90% to demo increased endurance  Short term goal 4: Ascend/descend 2 steps with HR and SBA       Plan: Progress functional mobility as medically appropriate.    Time In: 932  Time Out: 944  Time Coded Minutes (treatment minutes): 12  Rehab Potential: Good  Treatments/week: 3-5x/wk  Hermilo Mondragon PTA

## 2021-09-30 NOTE — PLAN OF CARE
Problem: Airway Clearance - Ineffective  Goal: Achieve or maintain patent airway  Outcome: Ongoing     Problem: Gas Exchange - Impaired  Goal: Absence of hypoxia  Outcome: Ongoing  Goal: Promote optimal lung function  Outcome: Ongoing     Problem: Breathing Pattern - Ineffective  Goal: Ability to achieve and maintain a regular respiratory rate  Outcome: Ongoing     Problem:  Body Temperature -  Risk of, Imbalanced  Goal: Ability to maintain a body temperature within defined limits  Outcome: Ongoing  Goal: Will regain or maintain usual level of consciousness  Outcome: Ongoing  Goal: Complications related to the disease process, condition or treatment will be avoided or minimized  Outcome: Ongoing     Problem: Isolation Precautions - Risk of Spread of Infection  Goal: Prevent transmission of infection  Outcome: Ongoing     Problem: Nutrition Deficits  Goal: Optimize nutritional status  Outcome: Ongoing     Problem: Risk for Fluid Volume Deficit  Goal: Maintain normal heart rhythm  Outcome: Ongoing  Goal: Maintain absence of muscle cramping  Outcome: Ongoing  Goal: Maintain normal serum potassium, sodium, calcium, phosphorus, and pH  Outcome: Ongoing     Problem: Loneliness or Risk for Loneliness  Goal: Demonstrate positive use of time alone when socialization is not possible  9/30/2021 0050 by Chad Hannon RN  Outcome: Ongoing  9/29/2021 1328 by Toro Moran RN  Outcome: Ongoing     Problem: Fatigue  Goal: Verbalize increase energy and improved vitality  9/30/2021 0050 by Chad Hannon RN  Outcome: Ongoing  9/29/2021 1328 by Toro Moran RN  Outcome: Ongoing     Problem: Patient Education: Go to Patient Education Activity  Goal: Patient/Family Education  Outcome: Ongoing     Problem: Falls - Risk of:  Goal: Will remain free from falls  Description: Will remain free from falls  Outcome: Ongoing  Goal: Absence of physical injury  Description: Absence of physical injury  Outcome: Ongoing     Problem: Nutrition  Goal:

## 2021-09-30 NOTE — PLAN OF CARE
Problem: Body Temperature -  Risk of, Imbalanced  Goal: Will regain or maintain usual level of consciousness  9/30/2021 1443 by Isha Jones RN  Outcome: Met This Shift  9/30/2021 0610 by Leo Quesada RN  Outcome: Ongoing  9/30/2021 0050 by Leo Quesada RN  Outcome: Ongoing     Problem: Risk for Fluid Volume Deficit  Goal: Maintain normal heart rhythm  9/30/2021 1443 by Isha Jones RN  Outcome: Met This Shift  9/30/2021 0610 by Leo Quesada RN  Outcome: Ongoing  9/30/2021 0050 by Leo Quesada RN  Outcome: Ongoing     Problem: Falls - Risk of:  Goal: Will remain free from falls  Description: Will remain free from falls  9/30/2021 1443 by Isha Jones RN  Outcome: Met This Shift  9/30/2021 0610 by Leo Quesada RN  Outcome: Ongoing  9/30/2021 0050 by Leo Quesada RN  Outcome: Ongoing     Problem: SKIN INTEGRITY  Goal: Skin integrity is maintained or improved  Outcome: Met This Shift     Problem: Airway Clearance - Ineffective  Goal: Achieve or maintain patent airway  9/30/2021 0610 by eLo Quesada RN  Outcome: Ongoing  9/30/2021 0050 by Leo Quesada RN  Outcome: Ongoing     Problem: Gas Exchange - Impaired  Goal: Absence of hypoxia  9/30/2021 0610 by Leo Quesada RN  Outcome: Ongoing  9/30/2021 0050 by Leo Quesada RN  Outcome: Ongoing  Goal: Promote optimal lung function  9/30/2021 0610 by Leo Quesada RN  Outcome: Ongoing  9/30/2021 0050 by Leo Quesada RN  Outcome: Ongoing     Problem: Breathing Pattern - Ineffective  Goal: Ability to achieve and maintain a regular respiratory rate  9/30/2021 1443 by Isha Jones RN  Outcome: Ongoing  9/30/2021 0610 by Leo Quesada RN  Outcome: Ongoing  9/30/2021 0050 by Leo Quesada RN  Outcome: Ongoing     Problem:  Body Temperature -  Risk of, Imbalanced  Goal: Ability to maintain a body temperature within defined limits  9/30/2021 1443 by Isha Jones RN  Outcome: Ongoing  9/30/2021 0610 by Mammie Reels, RN  Outcome: Ongoing  9/30/2021 0050 by Leo Quesada,

## 2021-09-30 NOTE — PROGRESS NOTES
[facility]  Office: [address], DO, Mel [place], DO, John Alex, DO, Ana Luisa [place] Blood, DO, Jonas Barragan MD, Adore Mnier MD, Emiliano Brewer MD, Kameron Corbett MD, Marshall Klein MD, Eduard Elena MD, Gilberto Orantes MD, Charles Moore, DO, Axel France, DO, Feliciano Womack MD,  Saintclair Lank, DO, Tami Leon MD, Sidra Guerrero MD, Mali Monterroso MD, Rajesh Lr MD, , Ana Maria Thornton MD, Evelyn Rosas MD, Godwin Ivey MD, Daryle Springer, Sarai Gao, CNP, Josue Gallo, CNP, Cy Guzmán, CNS, Shantel Tinoco, CNP, Xavi Muse, CNP, Awais Lizama, CNP, Dayron Todd, CNP, Mateo Loja, CNP, SHILPA MichelC, Kashmir Bang, [facility], Odalys Horta, CNP, Shane Pride, CNP, Jose G Zaldivar, CNP, Fatuma Arce, CNP, Clem Huffman, CNP, Isatu Pritchard, CNP, Migue Atkins, CNP, Victory Lalit, [address] [place] [address]    Progress Note    9/30/2021    4:58 PM    Name:   Karlie Maya  MRN:     [id number]     Acct:      [de-identified]   Room:   [address] [place] [address] Day:  9  Admit Date:  9/21/2021  8:43 PM    PCP:   Marla Lopez  Code Status:  Full Code    Subjective:     C/C:   Chief Complaint   Patient presents with   Bernestine Power Other     COVID+    Shortness of Breath     Interval History Status: Improved    Pt seen and examined this morning. No acute events overnight. Patient weaned to high flow nasal cannula today. Has been on since yesterday evening. Reports that she is feeling about the same. Short and shallow respirations noted. Discussed the remainder of her hospital course which will include weaning supplemental oxygen. Brief History:     Karlie Maya is a 45 y.o.  Non- / non  female who presents with Other (COVID+) and Shortness of Breath   and is admitted to the hospital for the management of Covid 19 Pneumonia      Patient presented to the emergency room for the concern of worsening COVID-19 Symptoms. Patient was originally seen Patient was originally diagnosed with the COVID 4 days prior. Patient states since diagnosis she has been having increased shortness of breath, cough  With water-like diarrhea. Upon arrival to the emergency room patient was noted to be hypoxic with saturations in the 80's  she required 6 lpm nc supplemental oxygen. Was seen by infectious disease, started on high-dose steroids, Actemra was given on 9/22/2021. Patient however continue to require more oxygen. She has been feeling fine for the past couple days but still requires 90% FiO2 to maintain oxygen of 91 to 90%. She still feels very winded with ambulation.      Did not receive the vaccine because she felt it was \"rushed. \"    - Uptrendning oxygen requirements prompting transfer to [facility] ICU overnight on 9/27-9/28. Review of Systems:     Constitutional:  negative for chills, fevers, sweats, + fatigue  Respiratory:  +cough, +dyspnea on exertion, +shortness of breath,  No wheezing  Cardiovascular:  negative for chest pain, chest pressure/discomfort, lower extremity edema, palpitations  Gastrointestinal:  negative for abdominal pain, constipation, diarrhea, nausea, vomiting  Neurological:  negative for dizziness, headache    Medications: Allergies:     Allergies   Allergen Reactions    Cephalexin      Gets yeast infection    Codeine        Current Meds:   Scheduled Meds:    furosemide  20 mg IntraVENous Daily    insulin glargine  50 Units SubCUTAneous BID    vitamin C  500 mg Oral BID    fluticasone  1 spray Each Nostril Daily    cetirizine  10 mg Oral Daily    insulin lispro  0-18 Units SubCUTAneous TID WC    insulin lispro  0-9 Units SubCUTAneous Nightly    famotidine  20 mg Oral Daily    dexamethasone  10 mg IntraVENous Daily    aspirin  81 mg Oral Daily    ferrous sulfate  325 mg Oral Daily with breakfast    sodium chloride flush  5-40 mL IntraVENous 2 times per day    enoxaparin 30 mg SubCUTAneous BID    Vitamin D  2,000 Units Oral Daily     Continuous Infusions:    dextrose      sodium chloride 25 mL (21 1822)     PRN Meds: albuterol sulfate HFA **AND** ipratropium, dextromethorphan-guaiFENesin, glucose, glucagon (rDNA), dextrose, ALPRAZolam, potassium chloride **OR** potassium alternative oral replacement **OR** potassium chloride, albuterol sulfate HFA, glucose, dextrose, glucagon (rDNA), magnesium sulfate, sodium phosphate IVPB **OR** sodium phosphate IVPB **OR** sodium phosphate IVPB, sodium chloride flush, sodium chloride, ondansetron **OR** ondansetron, magnesium hydroxide, acetaminophen **OR** acetaminophen    Data:     Past Medical History:   has a past medical history of Anemia, Chronic back pain, Diabetes mellitus ([facility] H/O LEEP, Hypertension, Obesity, morbid, BMI 40.0-49.9 ([facility] and S/P endometrial ablation. Social History:   reports that she has never smoked. She has never used smokeless tobacco. She reports current alcohol use. She reports that she does not use drugs. Family History:   Family History   Problem Relation Age of Onset    Hypotension Mother     Heart Disease Father     Heart Failure Father        Vitals:  BP 99/67   Pulse 72   Temp 98.2 °F (36.8 °C) (Axillary)   Resp 26   Ht 5' 3\" (1.6 m)   Wt 255 lb 11.7 oz (116 kg)   SpO2 92%   BMI 45.30 kg/m²   Temp (24hrs), Av.2 °F (36.8 °C), Min:98.1 °F (36.7 °C), Max:98.3 °F (36.8 °C)    Recent Labs     21  2207 21  2322 21  0857 21  1148   POCGLU 347* 322* 141* 234*       I/O (24Hr):     Intake/Output Summary (Last 24 hours) at 2021 1658  Last data filed at 2021 1052  Gross per 24 hour   Intake    Output 650 ml   Net -650 ml       Labs:  Hematology:  Recent Labs     21  0456 21  0507   WBC 10.3 10.8   RBC 5.19* 5.16*   HGB 15.5* 15.2*   HCT 44.9 45.1   MCV 86.5 87.4   MCH 29.9 29.5   MCHC 34.5 33.7   RDW 12.9 13.2    264   MPV 10.8 11.2 CRP  --  <3.0     Chemistry:  Recent Labs     09/29/21  0456 09/30/21  0507    137   K 3.7 4.0   CL 98 100   CO2 27 24   GLUCOSE 173* 194*   BUN 27* 30*   CREATININE 0.66 0.56   ANIONGAP 13 13   LABGLOM >60 >60   GFRAA >60 >60   CALCIUM 8.8 8.7     Recent Labs     09/29/21  1203 09/29/21  1747 09/29/21  2207 09/29/21  2322 09/30/21  0857 09/30/21  1148   POCGLU 260* 261* 347* 322* 141* 234*         Radiology:  XR CHEST PORTABLE    Result Date: 9/21/2021  Multifocal airspace opacities worrisome multifocal atypical viral pneumonia. Low lung volumes       Physical Examination:        General appearance:  alert, cooperative, ill-appearing, obese  female sitting up in bed. Mental Status:  oriented to person, place and time and normal affect  HEENT: High flow nasal cannula present, EOMI, sclera are anti-icteric  Lungs: Tachypneic, diminished posteriorly, low inspiratory effort  Heart:  regular rate and rhythm, no murmur  Abdomen: protuberant, soft, nontender, nondistended, normal bowel sounds, no masses, hepatomegaly, splenomegaly  Extremities:  no edema, redness, tenderness in the calves  Skin:  no gross lesions, rashes, induration    Assessment:        Hospital Problems         Last Modified POA    * (Principal) Pneumonia due to COVID-19 virus 9/28/2021 Yes    Sepsis due to COVID-19 [facility]) 9/28/2021 Yes    Acute respiratory failure with hypoxia ([facility] 9/28/2021 Yes    Essential hypertension 9/28/2021 Yes    Elevated C-reactive protein (CRP) 9/28/2021 Yes    Type 2 diabetes mellitus with hyperglycemia, with long-term current use of insulin ([facility] 9/28/2021 Yes    Obesity, morbid, BMI 40.0-49.9 ([facility] 9/28/2021 Yes          Plan:        COVID-19 viral pneumonia:   - Infectious disease following   - Oxygen requirements are currently stable; alternate high flow nasal cannula and BiPAP therapy.   - Received 5 days of Decadron 20 mg. Currently on day #4 of 10 mg Decadron  - Actemra received 9/22  - Vitamin C, vitamin D  - DVT ppx - Lovenox    Acute hypoxic respiratory failure:   - Now requiring Bipap continuously. % FiO2  - Pulmonology following  - Continue combivent scheduled  - IS/acapella  - Continue Lasix 20 mg daily     Sepsis from covid: Secondary to #1    Essential hypertension: Currently stable off of meds. Diabetes mellitus type 2:   - Continue Lantus to 50 units BID with ISS mealtime coverage. Glucoses are currently controlled. Morbid obesity (BMI 45.69): Weight loss encouraged    GI/DVT prophylaxis: Pepcid, Lovenox twice daily    Labs reviewed  Imaging personally reviewed - CXR with diffuse bilateral hazy infiltrates. Slightly worse today. Continue slow oxygen wean. Patient is hopefully plateauing now.     33 Kay Bella DO  9/30/2021  4:58 PM

## 2021-09-30 NOTE — PLAN OF CARE
Problem: OXYGENATION/RESPIRATORY FUNCTION  Goal: Patient will achieve/maintain normal respiratory rate/effort  Description: Respiratory rate and effort will be within normal limits for the patient  9/30/2021 6842 by King Fonseca RCP  Outcome: Ongoing

## 2021-09-30 NOTE — PROGRESS NOTES
[facility] -   Infectious diseases evaluation  admission date 9/21/2021    reason for consultation:   COVID    Impression :   Current:    Covid, pneumonia  Increase inflammatory markers    ·   Discussion / summary of stay / plan of care   ·   Recommendations   · Took a dose of Actemra  · On 10 mg Decadron  · Anticoagulation  · She responded by CRP, started to respond clinically slowly    Infection Control Recommendations   · Reading Precautions  · Contact Isolation   · Airborne isolation  · Droplet Isolation      Antimicrobial Stewardship Recommendations   · Of antibiotics    Coordination ofOutpatient Care:   · Estimated Length of IV antimicrobials:  · Patient will need Midline / picc Catheter Insertion:   · Patient will need SNF:  · Patient will need outpatient wound care:     History of Present Illness:   Initial history:  Gin Raymundo is a 45y.o.-year-old female     Interval changes  9/30/2021   Patient Vitals for the past 8 hrs:   BP Temp Temp src Pulse Resp SpO2   09/30/21 1500    72 26 92 %   09/30/21 1450    75 (!) 31 92 %   09/30/21 1400 99/67   79 23 93 %   09/30/21 1300 114/73   82 (!) 35 92 %   09/30/21 1151 121/75 98.2 °F (36.8 °C) Axillary 75 23 92 %   09/30/21 1100    79 29 (!) 89 %   09/30/21 1000 110/80   78 26 90 %   09/30/21 0900 102/68 98.3 °F (36.8 °C) Axillary 87 (!) 31 90 %   09/30/21 0805    81 22 (!) 89 %   09/30/21 0800 123/89   73 25 94 %       Poor turnaround overnight and she is now on 80% high flow alternating with 100%. She is eating she has no fever, she had taken Actemra, a little depressed, was encouraged on the bedside    9/29: Labs within range, chest x-ray showing progression, the patient is on higher oxygenation, 90% BiPAP and saturating 90% only. Abdomen soft nontender otherwise.   Alert    -128-47    9/30:  CRP < 3 -WBC 10  Patient feels better, is on high flow 80%, saturation 88-92% and depends on if she is at rest or moving evaluate. No new positive cultures, trying to wean off oxygen slowly      Allergies:   Cephalexin and Codeine     Review of Systems:     Review of Systems   Constitutional: Negative for activity change. HENT: Negative for congestion. Eyes: Negative for pain and redness. Respiratory: Positive for shortness of breath. Cardiovascular: Negative for chest pain. Gastrointestinal: Negative for abdominal distention. Endocrine: Negative for heat intolerance. Genitourinary: Negative for dysuria, flank pain and frequency. Musculoskeletal: Negative for arthralgias. Skin: Negative for color change. Allergic/Immunologic: Negative for immunocompromised state. Neurological: Negative for dizziness. Hematological: Negative for adenopathy. Psychiatric/Behavioral: Negative for agitation. Physical Examination :       Physical Exam  Constitutional:       General: She is not in acute distress. Appearance: Normal appearance. She is ill-appearing. HENT:      Head: Normocephalic and atraumatic. Nose: Nose normal.   Eyes:      General: No scleral icterus. Conjunctiva/sclera: Conjunctivae normal.      Pupils: Pupils are equal, round, and reactive to light. Cardiovascular:      Rate and Rhythm: Normal rate and regular rhythm. Heart sounds: No murmur heard. No friction rub. Pulmonary:      Effort: No respiratory distress. Breath sounds: Normal breath sounds. Abdominal:      General: There is no distension. Palpations: Abdomen is soft. Tenderness: There is no abdominal tenderness. Genitourinary:     Comments: No hernandez  Musculoskeletal:         General: No swelling, tenderness or deformity. Cervical back: Neck supple. No rigidity or tenderness. Skin:     General: Skin is dry. Coloration: Skin is not jaundiced or pale. Findings: No erythema. Neurological:      General: No focal deficit present.       Mental Status: She is alert and oriented to person, place, and time. Psychiatric:         Mood and Affect: Mood normal.         Behavior: Behavior normal.         Past Medical History:     Past Medical History:   Diagnosis Date    Anemia     Chronic back pain     Diabetes mellitus ([facility]     H/O LEEP     Hypertension     Obesity, morbid, BMI 40.0-49.9 ([facility] 9/28/2021    S/P endometrial ablation        Past Surgical  History:     Past Surgical History:   Procedure Laterality Date    ENDOMETRIAL ABLATION      LEEP         Medications:      furosemide  20 mg IntraVENous Daily    insulin glargine  50 Units SubCUTAneous BID    vitamin C  500 mg Oral BID    fluticasone  1 spray Each Nostril Daily    cetirizine  10 mg Oral Daily    insulin lispro  0-18 Units SubCUTAneous TID WC    insulin lispro  0-9 Units SubCUTAneous Nightly    famotidine  20 mg Oral Daily    dexamethasone  10 mg IntraVENous Daily    aspirin  81 mg Oral Daily    ferrous sulfate  325 mg Oral Daily with breakfast    sodium chloride flush  5-40 mL IntraVENous 2 times per day    enoxaparin  30 mg SubCUTAneous BID    Vitamin D  2,000 Units Oral Daily       Social History:     Social History     Socioeconomic History    Marital status: Single     Spouse name: Not on file    Number of children: Not on file    Years of education: Not on file    Highest education level: Not on file   Occupational History    Not on file   Tobacco Use    Smoking status: Never Smoker    Smokeless tobacco: Never Used   Substance and Sexual Activity    Alcohol use: Yes     Comment: occa.     Drug use: No    Sexual activity: Not on file   Other Topics Concern    Not on file   Social History Narrative    Not on file     Social Determinants of Health     Financial Resource Strain:     Difficulty of Paying Living Expenses:    Food Insecurity:     Worried About Running Out of Food in the Last Year:     [address] in the Last Year:    Lopoly Needs:     Lack of Transportation (Medical):  Lack of Transportation (Non-Medical):    Physical Activity:     Days of Exercise per Week:     Minutes of Exercise per Session:    Stress:     Feeling of Stress :    Social Connections:     Frequency of Communication with Friends and Family:     Frequency of Social Gatherings with Friends and Family:     Attends Confucianist Services:     Active Member of Clubs or Organizations:     Attends Club or Organization Meetings:     Marital Status:    Intimate Partner Violence:     Fear of Current or Ex-Partner:     Emotionally Abused:     Physically Abused:     Sexually Abused:        Family History:     Family History   Problem Relation Age of Onset    Hypotension Mother     Heart Disease Father     Heart Failure Father       Medical Decision Making:   I have independently reviewed/ordered the following labs:    CBC with Differential:   Recent Labs     09/29/21  0456 09/30/21  0507   WBC 10.3 10.8   HGB 15.5* 15.2*   HCT 44.9 45.1    264   LYMPHOPCT 10* 10*   MONOPCT 3 3     BMP:  Recent Labs     09/29/21  0456 09/30/21  0507    137   K 3.7 4.0   CL 98 100   CO2 27 24   BUN 27* 30*   CREATININE 0.66 0.56     Hepatic Function Panel: No results for input(s): PROT, LABALBU, BILIDIR, IBILI, BILITOT, ALKPHOS, ALT, AST in the last 72 hours. No results for input(s): RPR in the last 72 hours. No results for input(s): HIV in the last 72 hours. No results for input(s): BC in the last 72 hours. Lab Results   Component Value Date    CREATININE 0.56 09/30/2021    GLUCOSE 194 09/30/2021       Detailed results: Thank you for allowing us to participate in the care of this patient. Please call with questions.     This note is created with the assistance of a speech recognition program.  While intending to generate adocument that actually reflects the content of the visit, the document can still have some errors including those of syntax and sound a like substitutions which may escape proof reading. It such instances, actual meaningcan be extrapolated by contextual diversion.     Lawrence Lee MD  Office: [phone]  Perfect serve / office [phone]

## 2021-09-30 NOTE — CARE COORDINATION
Pt currently on high flow 100% 60L. [place]ia [place] at [facility] updated.  He will continue to follow

## 2021-09-30 NOTE — PROGRESS NOTES
PULMONARY & CRITICAL CARE MEDICINE PROGRESS NOTE     Patient:  Abelardo Dhaliwal  MRN: [id number]  Admit date: 9/21/2021  Primary Care Physician: Digna Hatfield  Consulting Physician: Felipe Brooks DO  CODE Status: Full Code  LOS: 9     SUBJECTIVE     CHIEF COMPLAINT/REASON FOR INITIAL CONSULT:   Acute respiratory failure/COVID-19 pneumonia    BRIEF HOSPITAL COURSE:   The patient is a 45 y.o. female history of diabetes mellitus, hypertension, morbid obesity  unvaccinated was admitted with shortness of breath and diagnosed with COVID-19 pneumonia. She apparently was diagnosed about 4 days ago. There was associated cough that is mostly dry and also having some diarrhea. Patient required increasing oxygen with initial oxygen saturation being in the 80s. No previous history of lung disease  Patient is a non-smoker    INTERVAL HISTORY:  09/30/21  Pt was seen and examined at bedside. Resting comfortably in the bed. Afebrile, hemodynamically stable  On HFNC 60L 100% FiO2  Intermittent BIPAP  Blood gases none today  Vitals stable. Labs reviewed. Unremarkable. No acute events overnight. REVIEW OF SYSTEMS:  Review of Systems   Constitutional: Positive for fatigue. Negative for appetite change and fever. HENT: Negative for postnasal drip, rhinorrhea, sore throat, trouble swallowing and voice change. Eyes: Negative for redness and visual disturbance. Respiratory: Positive for cough and shortness of breath. Negative for wheezing. Cardiovascular: Negative for chest pain, palpitations and leg swelling. Gastrointestinal: Negative for abdominal pain, constipation, diarrhea, nausea and vomiting. Endocrine: Negative for polyuria. Genitourinary: Negative for dysuria, frequency, hematuria and urgency. Musculoskeletal: Negative. Allergic/Immunologic: Negative. Neurological: Negative for dizziness, syncope, speech difficulty and headaches. Hematological: Negative for adenopathy.  Does not bruise/bleed easily. Psychiatric/Behavioral: Negative. pulm  OBJECTIVE     PaO2/FiO2 RATIO:  Recent Labs     21  0511   POCPO2 178.4*      FiO2 : 100 %     VITAL SIGNS:   LAST:  /75   Pulse 75   Temp 98.2 °F (36.8 °C) (Axillary)   Resp 23   Ht 5' 3\" (1.6 m)   Wt 255 lb 11.7 oz (116 kg)   SpO2 92%   BMI 45.30 kg/m²   8-24 HR RANGE:  TEMP Temp  Av.5 °F (36.9 °C)  Min: 98.1 °F (36.7 °C)  Max: 99.3 °F (69.7 °C)   BP Systolic (99AAX), RAJI:624 , Min:95 , VSD:928      Diastolic (75PSM), QNU:12, Min:68, Max:105     PULSE Pulse  Av.3  Min: 63  Max: 101   RR Resp  Av.1  Min: 22  Max: 31   O2 SAT SpO2  Av.7 %  Min: 89 %  Max: 94 %   OXYGEN DELIVERY O2 Flow Rate (L/min)  Av L/min  Min: 60 L/min  Max: 60 L/min        SYSTEMIC EXAMINATION:   General appearance - Ill-appearing, mild to moderate respiratory distress  Mental status - awake & alert, follows commands  Eyes - pupils equal and reactive, sclera anicteric  Mouth - mucous membranes moist, pharynx normal without lesions. Large tongue Mallampati 2  Neck -Short thick neck supple, no significant adenopathy, carotids upstroke normal bilaterally, no bruits  Chest - There is no intercostal recession or use of accessory muscles. Breath sounds bilaterally were dimnished to auscultation at bases. There were mild crackles present at bases. No expiratory wheezing and no rhonchi  Heart - normal rate, regular rhythm, normal S1, S2, no murmurs, rubs, clicks or gallops  Abdomen - soft, nontender, nondistended, no masses or organomegaly  Neurological - non-focal  Extremities - peripheral pulses normal, mild bilateral pedal edema, no clubbing or cyanosis.   No calf tenderness  Skin - normal coloration and turgor, no rashes, no suspicious skin lesions noted     DATA REVIEW     Medications: Current Inpatient  Scheduled Meds:   furosemide  20 mg IntraVENous Daily    insulin glargine  50 Units SubCUTAneous BID    vitamin C  500 mg Oral BID  fluticasone  1 spray Each Nostril Daily    cetirizine  10 mg Oral Daily    insulin lispro  0-18 Units SubCUTAneous TID WC    insulin lispro  0-9 Units SubCUTAneous Nightly    famotidine  20 mg Oral Daily    dexamethasone  10 mg IntraVENous Daily    aspirin  81 mg Oral Daily    ferrous sulfate  325 mg Oral Daily with breakfast    sodium chloride flush  5-40 mL IntraVENous 2 times per day    enoxaparin  30 mg SubCUTAneous BID    Vitamin D  2,000 Units Oral Daily     Continuous Infusions:   dextrose      sodium chloride 25 mL (09/22/21 1822)       INPUT/OUTPUT:  In: -   Out: 650 [Urine:650]  Date 09/30/21 0000 - 09/30/21 2359   Shift 2713-8841 7628-1202 0669-9386 24 Hour Total   INTAKE   Shift Total(mL/kg)       OUTPUT   Urine(mL/kg/hr)  650  650   Shift Total(mL/kg)  650(5.6)  650(5.6)   Weight (kg) 116 116 116 116        LABS:  ABGs:   Recent Labs     09/28/21  0511   POCPH 7.522*   POCPCO2 35.0   POCPO2 178.4*   POCHCO3 28.7*   [id number] 100*     CBC:   Recent Labs     09/29/21  0456 09/30/21  0507   WBC 10.3 10.8   HGB 15.5* 15.2*   HCT 44.9 45.1   MCV 86.5 87.4    264   LYMPHOPCT 10* 10*   RBC 5.19* 5.16*   MCH 29.9 29.5   MCHC 34.5 33.7   RDW 12.9 13.2     CRP:   Recent Labs     09/30/21  0507   CRP <3.0     LDH:   No results for input(s): LDH in the last 72 hours. BMP:   Recent Labs     09/29/21  0456 09/30/21  0507    137   K 3.7 4.0   CL 98 100   CO2 27 24   BUN 27* 30*   CREATININE 0.66 0.56   GLUCOSE 173* 194*     Liver Function Test:   No results for input(s): PROT, LABALBU, ALT, AST, GGT, ALKPHOS, BILITOT in the last 72 hours. Coagulation Profile:   No results for input(s): INR, PROTIME, APTT in the last 72 hours. D-Dimer:  No results for input(s): DDIMER in the last 72 hours. Ferritin:    No results for input(s): FERRITIN in the last 72 hours. Lactic Acid:  No results for input(s): LACTA in the last 72 hours.   Cardiac Enzymes:  No results for input(s): CKTOTAL, CKMB, Rhonda Hurtado in the last 72 hours. Invalid input(s): TROPONIN, HSTROP  BNP/ProBNP:   No results for input(s): BNP, PROBNP in the last 72 hours. Triglycerides:  No results for input(s): TRIG in the last 72 hours. Microbiology:  Urine Culture:  No components found for: CURINE  Blood Culture:  No components found for: CBLOOD, CFUNGUSBL  Sputum Culture:  No components found for: CSPUTUM  No results for input(s): SPECDESC, SPECIAL, CULTURE, STATUS, ORG, CDIFFTOXPCR, CAMPYLOBPCR, SALMONELLAPC, SHIGAPCR, SHIGELLAPCR, MPNEUG, MPNEUM, LACTOQL in the last 72 hours. No results for input(s): SPUTUM, SPECDESC, SPECIAL, CULTURE, STATUS, ORG, CDIFFTOXPCR, MPNEUM, MPNEUG in the last 72 hours. Invalid input(s): CURINE, CBLOOD, CFUNGUSBL     Pathology:    Radiology Reports:  XR CHEST PORTABLE   Final Result   Limited exam, findings suggest progression in extensive bilateral coarse   alveolar infiltrates suggesting progression in multifocal pneumonia in COVID   positive patient         XR CHEST (SINGLE VIEW FRONTAL)   Final Result   Bilateral diffuse pulmonary edema and or pneumonia without definite effusion. No cardiomegaly was identified. Slight improvement bilaterally has occurred from 09/21/2021. XR CHEST PORTABLE   Final Result   Multifocal airspace opacities worrisome multifocal atypical viral pneumonia. Low lung volumes              Echocardiogram:   No results found for this or any previous visit.        ASSESSMENT AND PLAN     Assessment:    // Acute hypoxic respiratory failure  // Acute respiratory distress syndrome  // Bilateral multifocal pneumonia due to COVID 19 infection  // Diabetes mellitus.  // Morbid obesity  // Likely obstructive sleep apnea/hypoventilation  // Hypertension  // E. coli in urine    Plan:    - Saturating well on HFNC 60L at 100%  - Using intermittent HFNC and BiPAP  - Encourage prone position when sleeping, incentive spirometry  - Encourage prone positioning when sleeping.  - Daily CRP: 9/21 - 205, 128, 47  - Tested positive:  9/17/21  - Symptom onset:  9/16/21  - Out of Isolation: No  - Vaccinated: No  - Remdesivir: 9/22  - Decadron: 9/21, 9/22, 9/23, 9/24, 9/25, 9/26, 9/27, 9/28, 9/29, 9/30  - Actemra: 9/22/21  - Co-infection: No. Asymptomatic bacteruria E Coli in urine.  - Fluid Balance: euvolemic  - Glycemic Control: Per primary team.     Discussed with respiratory therapist treatment plan discussed  Discussed with nursing staff. Carl Gross MD  Internal Medicine Resident, PGY-3  [address], [place]  9/30/2021, 12:34 PM    This patient was evaluated in the context of the global SARS-CoV-2 (COVID-19) pandemic, which necessitated considerations that the patient either has COVID-19 infection or is at risk of infection with COVID-19. Institutional protocols and algorithms that pertain to the evaluation & management of patients with COVID-19 or those at risk for COVID-19 are in a state of rapid changes based on information released by regulatory bodies including the CDC and federal and state organizations. These policies and algorithms were followed during the patient's care. Please note that this chart was generated using voice recognition Dragon dictation software. Although every effort was made to ensure the accuracy of this automated transcription, some errors in transcription may have occurred.

## 2021-10-01 NOTE — PLAN OF CARE
Problem: Airway Clearance - Ineffective  Goal: Achieve or maintain patent airway  10/1/2021 0221 by Alissa Tucker RN  Outcome: Ongoing  10/1/2021 0009 by Sally Raman RCP  Outcome: Ongoing  9/30/2021 2230 by Alissa Tucker RN  Outcome: Ongoing     Problem: Gas Exchange - Impaired  Goal: Absence of hypoxia  10/1/2021 0221 by Alissa Tucker RN  Outcome: Ongoing  10/1/2021 0009 by Sally Raman RCP  Outcome: Ongoing  9/30/2021 2230 by Alissa Tucker RN  Outcome: Ongoing  Goal: Promote optimal lung function  10/1/2021 0221 by Alissa Tucker RN  Outcome: Ongoing  10/1/2021 0009 by Sally Raman RCP  Outcome: Ongoing  9/30/2021 2230 by Alissa Tucker RN  Outcome: Ongoing     Problem: Breathing Pattern - Ineffective  Goal: Ability to achieve and maintain a regular respiratory rate  10/1/2021 0221 by Alissa Tucker RN  Outcome: Ongoing  10/1/2021 0009 by Sally Raman RCP  Outcome: Ongoing  9/30/2021 2230 by Alissa Tucker RN  Outcome: Ongoing  9/30/2021 1443 by Brenda Em RN  Outcome: Ongoing     Problem:  Body Temperature -  Risk of, Imbalanced  Goal: Ability to maintain a body temperature within defined limits  10/1/2021 0221 by Alissa Tucker RN  Outcome: Ongoing  9/30/2021 2230 by Alissa Tucker RN  Outcome: Ongoing  9/30/2021 1443 by Brenda Em RN  Outcome: Ongoing  Goal: Will regain or maintain usual level of consciousness  10/1/2021 0221 by Alissa Tucker RN  Outcome: Ongoing  9/30/2021 2230 by Alissa Tucker RN  Outcome: Ongoing  9/30/2021 1443 by Brenda Em RN  Outcome: Met This Shift  Goal: Complications related to the disease process, condition or treatment will be avoided or minimized  10/1/2021 0221 by Alissa Tucker RN  Outcome: Ongoing  9/30/2021 2230 by Alissa Tucker RN  Outcome: Ongoing     Problem: Isolation Precautions - Risk of Spread of Infection  Goal: Prevent transmission of infection  10/1/2021 0221 by Alissa Tucker, RN  Outcome: injury  Description: Absence of physical injury  10/1/2021 0221 by Moises Weathers RN  Outcome: Ongoing  9/30/2021 2230 by Moises Weathers RN  Outcome: Ongoing     Problem: Nutrition  Goal: Optimal nutrition therapy  10/1/2021 0221 by Moises Weathers RN  Outcome: Ongoing  9/30/2021 2230 by Moises Weathers RN  Outcome: Ongoing  9/30/2021 1443 by Darrel Goodrich RN  Outcome: Ongoing     Problem: OXYGENATION/RESPIRATORY FUNCTION  Goal: Patient will achieve/maintain normal respiratory rate/effort  Description: Respiratory rate and effort will be within normal limits for the patient  10/1/2021 0221 by Moises Weathers RN  Outcome: Ongoing  9/30/2021 2230 by Moises Weathers RN  Outcome: Ongoing     Problem: SKIN INTEGRITY  Goal: Skin integrity is maintained or improved  10/1/2021 0221 by Moises Weathers RN  Outcome: Ongoing  9/30/2021 2230 by Moises Waethers RN  Outcome: Ongoing  9/30/2021 1443 by Darrel Goodrich RN  Outcome: Met This Shift

## 2021-10-01 NOTE — PROGRESS NOTES
[facility]  Progress Note    Carley Sifuentes  MRN: [id number]  Date: 10/1/2021  LOS: 8     Reason for F/U :   COVID-19 virus pneumonia    Impression :   COVID-19 virus infection tested + 9/17/2021  Acute hypoxic respiratory failure  Elevated inflammatory marker with CRP greater than 200  Responded well to medical treatment  Asymptomatic bacteriuria with E. coli isolated in the urine  Morbid obesity  Diabetes mellitus type 2    Recommendations:   Continue high-dose Decadron and received 20 mg IV daily through 9/26/2021 and now down to 10 mg IV daily for 5 days   The patient received a dose of Actemra 9/22/2021  Clinically the patient is doing much better but she continues to require significant oxygen support. She is on high flow at 60 L and 100%. She is requiring 80% on BiPAP. Continue supportive therapy    Infection Control Recommendations:   Droplet plus precautions    Discharge Planning:   Patient will need Midline Catheter Insertion/ PICC line Insertion: No  Patient will need: Home IV , [place],  SNF,  LTAC: Undetermined  Patient willneed outpatient wound care: No    Medical Decision making / Summary of Stay:   Carley Sifuentes is a 45y.o.-year-old female who was initially admitted on 9/21/2021. Ofelia Snider is an unvaccinated 44-year-old woman with a history of diabetes mellitus type 2, hypertension, chronic back pain, anemia and morbid obesity among other medical problems. She reports that on Tuesday, September 13, 2021 she started having symptoms with a cough, headache, generalized malaise. She does not report any subjective fevers, chills or sweats but subsequently started to develop some shortness of breath as well as some diarrhea. The diarrhea though she could not quite say if it was related to this illness as she does have irritable bowel syndrome and intermittently does have diarrhea.   The patient did subsequently have COVID-19 testing done on September 17, 2021 and was found to be Covid positive. By Saturday she reports that she was having significant episodes of shortness of breath which prompted her to come into the emergency department for evaluation.     In the ED he was noted to be hypoxic with saturations in the 80s and was started on 6 L of oxygen by nasal cannula and due to worsening hypoxia the patient was placed on high flow O2 by nasal cannula at 40 L and 100% and was also placed on a nonrebreather. The BiPAP was also ordered and the patient was seen by the pulmonology service earlier today. The patient had initially been started on remdesivir and Decadron and the Decadron dose was increased to high-dose Decadron today. The patient was given Actemra 2021    CRP is continued to trend down and the patient has responded well to therapy. Chest x-ray 2021 showed improved airspace opacity      Current evaluation:10/1/2021    /63   Pulse 99   Temp 98.1 °F (36.7 °C) (Oral)   Resp 24   Ht 5' 3\" (1.6 m)   Wt 255 lb 11.7 oz (116 kg)   SpO2 96%   BMI 45.30 kg/m²     Temperature Range: Temp: 98.1 °F (36.7 °C) Temp  Av.1 °F (36.7 °C)  Min: 97.5 °F (36.4 °C)  Max: 98.5 °F (36.9 °C)  The patient is seen and evaluated at bedside she is awake and alert and still has some shortness of breath but overall is feeling better. She is sitting up in the chair and reports that she feels better when she is sitting in the chair. The patient continues on high flow at 60 L and 100%. She is on 80% BiPAP. Review of Systems   Constitutional: Negative. Respiratory: Positive for shortness of breath. Cardiovascular: Negative. Gastrointestinal: Negative. Genitourinary: Negative. Musculoskeletal: Negative. Skin: Negative. Neurological: Negative. Psychiatric/Behavioral: Negative. Physical Examination :     Physical Exam  Constitutional:       Appearance: She is well-developed. She is obese.    HENT:      Head: Normocephalic and atraumatic. Cardiovascular:      Rate and Rhythm: Regular rhythm. Heart sounds: Normal heart sounds. Pulmonary:      Breath sounds: Rales present. Abdominal:      General: Bowel sounds are normal.      Palpations: Abdomen is soft. Musculoskeletal:      Cervical back: Neck supple. Skin:     General: Skin is warm and dry. Neurological:      Mental Status: She is alert and oriented to person, place, and time. Laboratory data:   I have independently reviewed the followinglabs:  CBC with Differential:   Recent Labs     09/29/21  0456 09/30/21  0507   WBC 10.3 10.8   HGB 15.5* 15.2*   HCT 44.9 45.1    264   LYMPHOPCT 10* 10*   MONOPCT 3 3     BMP:   Recent Labs     09/29/21  0456 09/30/21  0507    137   K 3.7 4.0   CL 98 100   CO2 27 24   BUN 27* 30*   CREATININE 0.66 0.56     Hepatic Function Panel:   No results for input(s): PROT, LABALBU, BILIDIR, IBILI, BILITOT, ALKPHOS, ALT, AST in the last 72 hours. Lab Results   Component Value Date    PROCAL 0.09 09/24/2021    PROCAL 0.13 09/21/2021     Lab Results   Component Value Date    CRP <3.0 09/30/2021    CRP 47.2 09/24/2021    .3 09/23/2021     No results found for: Yumiko Salazar      Lab Results   Component Value Date    DDIMER 0.54 09/21/2021     Lab Results   Component Value Date    FERRITIN 706 09/22/2021    FERRITIN 756 09/21/2021     No results found for: LDH  Lab Results   Component Value Date    FIBRINOGEN 583 09/21/2021       Results in Past 30 Days  Result Component Current Result Ref Range Previous Result Ref Range   SARS-CoV-2, Rapid DETECTED (A) (9/21/2021) Not Detected Not in Time Range      Lab Results   Component Value Date    COVID19 DETECTED 09/21/2021       No results for input(s): VANCOTROUGH in the last 72 hours.     Imaging Studies:   ONE XRAY VIEW OF THE CHEST 9/29/2021 6:25 am  Impression   Limited exam, findings suggest progression in extensive bilateral coarse   alveolar infiltrates suggesting progression in multifocal pneumonia in COVID   positive patient       Cultures:     Culture, Urine [[id number]] (Abnormal) Collected: 09/22/21 1925   Order Status: Completed Specimen: Urine Random Updated: 09/23/21 1436    Specimen Description . Random Urine    Special Requests NOT REPORTED    Culture ESCHERICHIA COLI 50 to 100,000 CFU/MLAbnormal    Escherichia coli (1)    Antibiotic Interpretation DEBBY Status    amikacin  NOT REPORTED Final    ampicillin Sensitive 4 Final    ampicillin-sulbactam  NOT REPORTED Final    aztreonam Sensitive <=1 Final    ceFAZolin Sensitive <=4 Final    ceFAZolin Sensitive Cefazolin sensitivity results can be used to predict the effectiveness of oral cephalosporins (eg. Cephalexin) in uncomplicated Urinary Tract Infections due to E. coli, K. pneumoniae, and P. mirabilis Final    cefepime  NOT REPORTED Final    cefTRIAXone Sensitive <=1 Final    ciprofloxacin Sensitive <=0.25 Final    ertapenem  NOT REPORTED Final    Confirmatory Extended Spectrum Beta-Lactamase Negative NEGATIVE Final    gentamicin Sensitive <=1 Final    meropenem  NOT REPORTED Final    nitrofurantoin Sensitive <=16 Final    tigecycline  NOT REPORTED Final    tobramycin Sensitive <=1 Final    trimethoprim-sulfamethoxazole Sensitive <=20 Final    piperacillin-tazobactam Sensitive <=4 Final        COVID-19, Rapid [[id number]] (Abnormal) Collected: 09/21/21 2123   Order Status: Completed Specimen: Nasopharyngeal Swab Updated: 09/21/21 2154    Specimen Description . NASOPHARYNGEAL SWAB    SARS-CoV-2, Rapid DETECTEDAbnormal     Comment:        Rapid NAAT: The specimen is POSITIVE for SARS-Cov-2, the novel coronavirus associated with   COVID-19.         This test has been authorized by the FDA under an Emergency Use Authorization (EUA) for use   by authorized laboratories.         The ID NOW COVID-19 assay is designed to detect the virus that causes COVID-19 in patients   with signs and symptoms of infection who are suspected of COVID-19. An individual without symptoms of COVID-19 and who is not shedding SARS-CoV-2 virus would   expect to have a negative (not detected) result in this assay. Fact sheet for Healthcare Providers: Ping   Fact sheet for Patients: Ping           Methodology: Isothermal Nucleic Acid Amplification         Results reported to the appropriate Health Department               Medications:      furosemide  20 mg IntraVENous Daily    insulin glargine  50 Units SubCUTAneous BID    vitamin C  500 mg Oral BID    fluticasone  1 spray Each Nostril Daily    cetirizine  10 mg Oral Daily    insulin lispro  0-18 Units SubCUTAneous TID WC    insulin lispro  0-9 Units SubCUTAneous Nightly    famotidine  20 mg Oral Daily    aspirin  81 mg Oral Daily    ferrous sulfate  325 mg Oral Daily with breakfast    sodium chloride flush  5-40 mL IntraVENous 2 times per day    enoxaparin  30 mg SubCUTAneous BID    Vitamin D  2,000 Units Oral Daily           [facility]  Duong Bustillos MD  [employer]  OFFICE: [phone]      Electronically signed by Duong Bustillos MD on 10/1/2021 at 6:26 PM  Thank you for allowing us to participate in the care of this patient. Please call with questions. This note iscreated with the assistance of a speech recognition program.  While intending to generate a document that actually reflects the content of the visit, the document can still have some errors including those of syntax andsound a like substitutions which may escape proof reading. In such instances, actual meaning can be extrapolated by contextual diversion.

## 2021-10-01 NOTE — PROGRESS NOTES
PULMONARY & CRITICAL CARE MEDICINE PROGRESS NOTE     Patient:  Anthony Rivera  MRN: [id number]  Admit date: 9/21/2021  Primary Care Physician: Anthony Ortiz  Consulting Physician: Elier Pelaez DO  CODE Status: Full Code  LOS: 10     SUBJECTIVE     CHIEF COMPLAINT/REASON FOR INITIAL CONSULT:   Acute respiratory failure/COVID-19 pneumonia    BRIEF HOSPITAL COURSE:   The patient is a 45 y.o. female history of diabetes mellitus, hypertension, morbid obesity  unvaccinated was admitted with shortness of breath and diagnosed with COVID-19 pneumonia. She apparently was diagnosed about 4 days ago. There was associated cough that is mostly dry and also having some diarrhea. Patient required increasing oxygen with initial oxygen saturation being in the 80s. No previous history of lung disease  Patient is a non-smoker    INTERVAL HISTORY:  10/01/21  Pt was seen and examined at bedside. Resting comfortably in the bed. Afebrile, hemodynamically stable  On HFNC 60L 95% FiO2  desats off HFNC  Intermittent BIPAP  Blood gases none today  Vitals stable. Labs reviewed. Unremarkable. No acute events overnight. REVIEW OF SYSTEMS:  Review of Systems   Constitutional: Positive for fatigue. Negative for appetite change and fever. HENT: Negative for postnasal drip, rhinorrhea, sore throat, trouble swallowing and voice change. Eyes: Negative for redness and visual disturbance. Respiratory: Positive for cough and shortness of breath. Negative for wheezing. Cardiovascular: Negative for chest pain, palpitations and leg swelling. Gastrointestinal: Negative for abdominal pain, constipation, diarrhea, nausea and vomiting. Endocrine: Negative for polyuria. Genitourinary: Negative for dysuria, frequency, hematuria and urgency. Musculoskeletal: Negative. Allergic/Immunologic: Negative. Neurological: Negative for dizziness, syncope, speech difficulty and headaches.    Hematological: Negative for adenopathy. Does not bruise/bleed easily. Psychiatric/Behavioral: Negative. pulm  OBJECTIVE     PaO2/FiO2 RATIO:  No results for input(s): POCPO2 in the last 72 hours. FiO2 : (S) 95 %     VITAL SIGNS:   LAST:  BP (!) 86/56   Pulse 65   Temp 97.5 °F (36.4 °C) (Oral)   Resp 22   Ht 5' 3\" (1.6 m)   Wt 255 lb 11.7 oz (116 kg)   SpO2 90%   BMI 45.30 kg/m²   8-24 HR RANGE:  TEMP Temp  Av.2 °F (36.8 °C)  Min: 97.5 °F (36.4 °C)  Max: 98.7 °F (22.8 °C)   BP Systolic (85BVD), TXC:110 , Min:86 , JEA:244      Diastolic (29VLC), IKY:24, Min:56, Max:75     PULSE Pulse  Av.4  Min: 60  Max: 91   RR Resp  Av.8  Min: 22  Max: 25   O2 SAT SpO2  Av.8 %  Min: 90 %  Max: 95 %   OXYGEN DELIVERY O2 Flow Rate (L/min)  Av L/min  Min: 60 L/min  Max: 60 L/min        SYSTEMIC EXAMINATION:   General appearance - Ill-appearing, mild to moderate respiratory distress  Mental status - awake & alert, follows commands  Eyes - pupils equal and reactive, sclera anicteric  Mouth - mucous membranes moist, pharynx normal without lesions. Large tongue Mallampati 2  Neck -Short thick neck supple, no significant adenopathy, carotids upstroke normal bilaterally, no bruits  Chest - There is no intercostal recession or use of accessory muscles. Breath sounds bilaterally were dimnished to auscultation at bases. There were mild crackles present at bases. No expiratory wheezing and no rhonchi  Heart - normal rate, regular rhythm, normal S1, S2, no murmurs, rubs, clicks or gallops  Abdomen - soft, nontender, nondistended, no masses or organomegaly  Neurological - non-focal  Extremities - peripheral pulses normal, mild bilateral pedal edema, no clubbing or cyanosis.   No calf tenderness  Skin - normal coloration and turgor, no rashes, no suspicious skin lesions noted     DATA REVIEW     Medications: Current Inpatient  Scheduled Meds:   furosemide  20 mg IntraVENous Daily    insulin glargine  50 Units SubCUTAneous BID    vitamin C  500 mg Oral BID    fluticasone  1 spray Each Nostril Daily    cetirizine  10 mg Oral Daily    insulin lispro  0-18 Units SubCUTAneous TID     insulin lispro  0-9 Units SubCUTAneous Nightly    famotidine  20 mg Oral Daily    dexamethasone  10 mg IntraVENous Daily    aspirin  81 mg Oral Daily    ferrous sulfate  325 mg Oral Daily with breakfast    sodium chloride flush  5-40 mL IntraVENous 2 times per day    enoxaparin  30 mg SubCUTAneous BID    Vitamin D  2,000 Units Oral Daily     Continuous Infusions:   dextrose      sodium chloride 25 mL (09/22/21 1822)       INPUT/OUTPUT:  In: 482 [P.O.:472; I.V.:10]  Out: 500 [Urine:500]  Date 10/01/21 0000 - 10/01/21 2359   Shift 0982-3376 7726-8767 1051-1356 24 Hour Total   INTAKE   P.O.(mL/kg/hr) 250(0.3)   250   Shift Total(mL/kg) 647(7.8)   250(2.2)   OUTPUT   Shift Total(mL/kg)       Weight (kg) 116 116 116 116        LABS:  ABGs:   No results for input(s): POCPH, POCPCO2, POCPO2, POCHCO3, REOC6KUE in the last 72 hours. CBC:   Recent Labs     09/29/21  0456 09/30/21  0507   WBC 10.3 10.8   HGB 15.5* 15.2*   HCT 44.9 45.1   MCV 86.5 87.4    264   LYMPHOPCT 10* 10*   RBC 5.19* 5.16*   MCH 29.9 29.5   MCHC 34.5 33.7   RDW 12.9 13.2     CRP:   Recent Labs     09/30/21  0507   CRP <3.0     LDH:   No results for input(s): LDH in the last 72 hours. BMP:   Recent Labs     09/29/21  0456 09/30/21  0507    137   K 3.7 4.0   CL 98 100   CO2 27 24   BUN 27* 30*   CREATININE 0.66 0.56   GLUCOSE 173* 194*     Liver Function Test:   No results for input(s): PROT, LABALBU, ALT, AST, GGT, ALKPHOS, BILITOT in the last 72 hours. Coagulation Profile:   No results for input(s): INR, PROTIME, APTT in the last 72 hours. D-Dimer:  No results for input(s): DDIMER in the last 72 hours. Ferritin:    No results for input(s): FERRITIN in the last 72 hours. Lactic Acid:  No results for input(s): LACTA in the last 72 hours.   Cardiac Enzymes:  No results for input(s): CKTOTAL, CKMB, CKMBINDEX, TROPONINI in the last 72 hours. Invalid input(s): TROPONIN, HSTROP  BNP/ProBNP:   No results for input(s): BNP, PROBNP in the last 72 hours. Triglycerides:  No results for input(s): TRIG in the last 72 hours. Microbiology:  Urine Culture:  No components found for: CURINE  Blood Culture:  No components found for: CBLOOD, CFUNGUSBL  Sputum Culture:  No components found for: CSPUTUM  No results for input(s): SPECDESC, SPECIAL, CULTURE, STATUS, ORG, CDIFFTOXPCR, CAMPYLOBPCR, SALMONELLAPC, SHIGAPCR, SHIGELLAPCR, MPNEUG, MPNEUM, LACTOQL in the last 72 hours. No results for input(s): SPUTUM, SPECDESC, SPECIAL, CULTURE, STATUS, ORG, CDIFFTOXPCR, MPNEUM, MPNEUG in the last 72 hours. Invalid input(s): CURINE, CBLOOD, CFUNGUSBL     Pathology:    Radiology Reports:  XR CHEST PORTABLE   Final Result   Limited exam, findings suggest progression in extensive bilateral coarse   alveolar infiltrates suggesting progression in multifocal pneumonia in COVID   positive patient         XR CHEST (SINGLE VIEW FRONTAL)   Final Result   Bilateral diffuse pulmonary edema and or pneumonia without definite effusion. No cardiomegaly was identified. Slight improvement bilaterally has occurred from 09/21/2021. XR CHEST PORTABLE   Final Result   Multifocal airspace opacities worrisome multifocal atypical viral pneumonia. Low lung volumes              Echocardiogram:   No results found for this or any previous visit.        ASSESSMENT AND PLAN     Assessment:    // Acute hypoxic respiratory failure  // Acute respiratory distress syndrome  // Bilateral multifocal pneumonia due to COVID 19 infection  // Diabetes mellitus.  // Morbid obesity  // Likely obstructive sleep apnea/hypoventilation  // Hypertension  // E. coli in urine    Plan:    - Saturating well on HFNC 60L at 95%  - Using intermittent HFNC and BiPAP  - Daily CRP: 9/21 - 205, 128, 47, <3  - Tested positive:  9/17/21  - Symptom onset:  9/16/21  - Out of Isolation: No  - Vaccinated: No  - Remdesivir: 9/22  - Decadron: 9/21, 9/22, 9/23, 9/24, 9/25, 9/26, 9/27, 9/28, 9/29, 9/30 - course completed  - Actemra: 9/22/21  - Co-infection: No. Asymptomatic bacteruria E Coli in urine.  - Fluid Balance: euvolemic  - Glycemic Control: Per primary team.     Discussed with respiratory therapist treatment plan discussed  Discussed with nursing staff. Destiny Restrepo MD  Internal Medicine Resident, PGY-3  [address], [place]  10/1/2021, 10:23 AM    This patient was evaluated in the context of the global SARS-CoV-2 (COVID-19) pandemic, which necessitated considerations that the patient either has COVID-19 infection or is at risk of infection with COVID-19. Institutional protocols and algorithms that pertain to the evaluation & management of patients with COVID-19 or those at risk for COVID-19 are in a state of rapid changes based on information released by regulatory bodies including the CDC and federal and state organizations. These policies and algorithms were followed during the patient's care. Please note that this chart was generated using voice recognition Dragon dictation software. Although every effort was made to ensure the accuracy of this automated transcription, some errors in transcription may have occurred. Attending Physician Statement  I have discussed the care of Aj Bang, including pertinent history and exam findings with the resident. I have reviewed the key elements of all parts of the encounter with the resident. I have seen and examined the patient with the resident. I agree with the assessment and plan and status of the problem list as documented. I seen the patient during my round today, chart reviewed, labs and medications reviewed overnight events noted per  She is afebrile last 24 hours respiratory rate remains in high 20s to low 30s.   She did use BiPAP overnight 18/10 and

## 2021-10-01 NOTE — PLAN OF CARE
Problem: Airway Clearance - Ineffective  Goal: Achieve or maintain patent airway  Outcome: Ongoing     Problem: Gas Exchange - Impaired  Goal: Absence of hypoxia  Outcome: Ongoing  Goal: Promote optimal lung function  Outcome: Ongoing     Problem: Breathing Pattern - Ineffective  Goal: Ability to achieve and maintain a regular respiratory rate  9/30/2021 2230 by Antwan Sue RN  Outcome: Ongoing  9/30/2021 1443 by Tyrone Coppola RN  Outcome: Ongoing     Problem:  Body Temperature -  Risk of, Imbalanced  Goal: Ability to maintain a body temperature within defined limits  9/30/2021 2230 by Antwan Seu RN  Outcome: Ongoing  9/30/2021 1443 by Tyrone Coppola RN  Outcome: Ongoing  Goal: Will regain or maintain usual level of consciousness  9/30/2021 2230 by Antwan Sue RN  Outcome: Ongoing  9/30/2021 1443 by Tyrone Coppola RN  Outcome: Met This Shift  Goal: Complications related to the disease process, condition or treatment will be avoided or minimized  Outcome: Ongoing     Problem: Isolation Precautions - Risk of Spread of Infection  Goal: Prevent transmission of infection  Outcome: Ongoing     Problem: Nutrition Deficits  Goal: Optimize nutritional status  Outcome: Ongoing     Problem: Risk for Fluid Volume Deficit  Goal: Maintain normal heart rhythm  9/30/2021 2230 by Antwan Sue RN  Outcome: Ongoing  9/30/2021 1443 by Tyrone Coppola RN  Outcome: Met This Shift  Goal: Maintain absence of muscle cramping  Outcome: Ongoing  Goal: Maintain normal serum potassium, sodium, calcium, phosphorus, and pH  Outcome: Ongoing     Problem: Loneliness or Risk for Loneliness  Goal: Demonstrate positive use of time alone when socialization is not possible  Outcome: Ongoing     Problem: Fatigue  Goal: Verbalize increase energy and improved vitality  9/30/2021 2230 by Antwan Sue RN  Outcome: Ongoing  9/30/2021 1443 by Tyrone Coppola RN  Outcome: Ongoing     Problem: Patient Education: Go to Patient Education Activity  Goal: Patient/Family Education  9/30/2021 2230 by Mary Magallanes RN  Outcome: Ongoing  9/30/2021 1443 by Mely Woodall RN  Outcome: Ongoing     Problem: Falls - Risk of:  Goal: Will remain free from falls  Description: Will remain free from falls  9/30/2021 2230 by Mary Magallanes RN  Outcome: Ongoing  9/30/2021 1443 by Mely Woodall RN  Outcome: Met This Shift  Goal: Absence of physical injury  Description: Absence of physical injury  Outcome: Ongoing     Problem: Nutrition  Goal: Optimal nutrition therapy  9/30/2021 2230 by Mary Magallanes RN  Outcome: Ongoing  9/30/2021 1443 by Mely Woodall RN  Outcome: Ongoing     Problem: OXYGENATION/RESPIRATORY FUNCTION  Goal: Patient will achieve/maintain normal respiratory rate/effort  Description: Respiratory rate and effort will be within normal limits for the patient  Outcome: Ongoing     Problem: SKIN INTEGRITY  Goal: Skin integrity is maintained or improved  9/30/2021 2230 by Mary Magallanes RN  Outcome: Ongoing  9/30/2021 1443 by Mely Woodall RN  Outcome: Met This Shift

## 2021-10-01 NOTE — PROGRESS NOTES
Physical Therapy  Facility/Department: [facility] CAR 3  Daily Treatment Note  NAME: Mikael Menendez  : 1980  MRN: [id number]    Date of Service: 10/1/2021    Discharge Recommendations:  Patient would benefit from continued therapy after discharge   PT Equipment Recommendations  Equipment Needed: No    Assessment   Body structures, Functions, Activity limitations: Decreased functional mobility ; Decreased balance;Decreased endurance;Decreased ADL status  Assessment: Pt amb 15 ft without device and SBA. Limited by endurance and frequent  rest breaks due to fatigue and O2 dropping to 85%. WOuld benefit from continued PT after d/c as appropriate to address further mobiltiy and return to prior level on independence  Prognosis: Good  REQUIRES PT FOLLOW UP: Yes  Activity Tolerance  Activity Tolerance: Patient limited by endurance     Patient Diagnosis(es): The encounter diagnosis was COVID-19.     has a past medical history of Anemia, Chronic back pain, Diabetes mellitus ([facility] [address] H/O LEEP, Hypertension, Obesity, morbid, BMI 40.0-49.9 ([facility] [address] and S/P endometrial ablation. has a past surgical history that includes LEEP and Endometrial ablation. Restrictions  Restrictions/Precautions  Restrictions/Precautions: Isolation, Up as Tolerated, Fall Risk  Required Braces or Orthoses?: No  Position Activity Restriction  Other position/activity restrictions: High-flow O2 at 40L  Subjective   General  Response To Previous Treatment: Patient with no complaints from previous session.   Family / Caregiver Present: No  Subjective  Subjective: Pt resting in recliner upon arrival, agreeable to PT. on 40L hi sharad  Pain Screening  Patient Currently in Pain: No  Vital Signs  Patient Currently in Pain: No       Orientation  Orientation  Overall Orientation Status: Within Normal Limits  Cognition      Objective      Transfers  Sit to Stand: Stand by assistance  Stand to sit: Stand by assistance  Ambulation  Ambulation?: Yes  Ambulation 1  Surface: level tile  Device: No Device  Assistance: Stand by assistance  Gait Deviations: Slow Isabela;Decreased step length  Distance: 15 ft  Comments: Limited by hi sharad, poor endurance     Balance  Posture: Good  Sitting - Static: Good  Sitting - Dynamic: Good;-  Standing - Static: Good  Standing - Dynamic: Good  Comments: standing without device    Exercise  Standing heel/toe raise, marching in place, mini squats x 5-10, frequent rest breaks due to O2 85% on hi sharad.  Vitals returned to >90% with diaphragmatic breathing tech   Goals  Short term goals  Time Frame for Short term goals: 14 visits  Short term goal 1: Perform bed mobility and functional transfers independently  Short term goal 2: Ambulate 300ft without AD and supervision  Short term goal 3: Participate in 30 minutes of therapy with SPO2 >90% to demo increased endurance  Short term goal 4: Ascend/descend 2 steps with HR and SBA    Plan    Plan  Times per week: 3-5x/wk  Current Treatment Recommendations: Strengthening, ROM, Balance Training, Functional Mobility Training, Transfer Training, Stair training, Gait Training, Endurance Training, Home Exercise Program, Safety Education & Training, Patient/Caregiver Education & Training  Safety Devices  Type of devices: Nurse notified, Call light within reach, Gait belt, Left in chair  Restraints  Initially in place: No     Therapy Time   Individual Concurrent Group Co-treatment   Time In 1124         Time Out 1152         Minutes 28         Timed Code Treatment Minutes: [address] [facility]

## 2021-10-01 NOTE — PROGRESS NOTES
Occupational Therapy  Facility/Department: [facility] CAR 3  Daily Treatment Note  NAME: Karlie Maya  : 1980  MRN: [id number]    Date of Service: 10/1/2021    Discharge Recommendations:  Patient would benefit from continued therapy after discharge  OT Equipment Recommendations  ADL Assistive Devices: Sock-Aid Hard;Reacher;Grab Bars - shower; Shower Chair with back;Long-handled Sponge    Assessment   Performance deficits / Impairments: Decreased functional mobility ; Decreased ADL status; Decreased endurance;Decreased high-level IADLs  Prognosis: Good  Patient Education: Pt ed on OT role, POC, importance of participation in therapy, pursed lip breathing, EC/WS, use of incentive spirometer, coping methods for anxiety-G return  REQUIRES OT FOLLOW UP: Yes  Activity Tolerance  Activity Tolerance: Patient Tolerated treatment well  Safety Devices  Safety Devices in place: Yes  Type of devices: Call light within reach;Gait belt;Left in chair;Nurse notified  Restraints  Initially in place: No         Patient Diagnosis(es): The encounter diagnosis was COVID-19.      has a past medical history of Anemia, Chronic back pain, Diabetes mellitus ([address] H/O LEEP, Hypertension, Obesity, morbid, BMI 40.0-49.9 ([facility] [address] and S/P endometrial ablation. has a past surgical history that includes LEEP and Endometrial ablation. Restrictions  Restrictions/Precautions  Restrictions/Precautions: Isolation, Up as Tolerated, Fall Risk  Required Braces or Orthoses?: No  Position Activity Restriction  Other position/activity restrictions: High-flow O2 at 40L  Subjective   General  Chart Reviewed: Yes  Patient assessed for rehabilitation services?: Yes  Family / Caregiver Present: No  General Comment  Comments: RN ok'd for OT tx. Pt agreeable to session, pleasent/cooperative throughout.   Vital Signs  Patient Currently in Pain: No   Orientation  Orientation  Overall Orientation Status: Within Functional Limits  Objective    ADL  Grooming: Setup;Modified independent  (Pt washed face seated in chair \)  LE Dressing: Increased time to complete;Supervision (Pt donned/doffed socks sitting in chair)  Toileting: Setup; Increased time to complete;Stand by assistance (Pt SBA for toilet transfer from chair to [facility]. No AD utilized, no LOB noted.  IND for anselmo care)  Additional Comments: Pt declined further ADLs this date, reports completing in AM        Balance  Sitting Balance: Supervision (Seated in chair)  Standing Balance: Stand by assistance  Standing Balance  Time: Approx 6  min  Activity: Static standing EOB, short func mobility to [facility]  Comment: Pt reports no dizziness/SOB during standing and transfer to [facility]  Functional Mobility  Functional - Mobility Device: No device  Activity: Other  Assist Level: Stand by assistance  Functional Mobility Comments: No LOB noted, pot completed short [facility] mobility d/t limiting hi sharad O2 lines  Toilet Transfers  Toilet - Technique: Ambulating  Equipment Used: Standard bedside commode  Toilet Transfer: Stand by assistance  Bed mobility  Supine to Sit: Unable to assess  Sit to Supine: Unable to assess  Scooting: Supervision  Comment: Pt in chair upon arrival and departure  Transfers  Sit to stand: Stand by assistance  Stand to sit: Stand by assistance     Cognition  Overall Cognitive Status: [facility]     Plan   Plan  Times per week: 2-4x/week  Current Treatment Recommendations: Safety Education & Training, Patient/Caregiver Education & Training, Self-Care / ADL, Functional Mobility Training, Equipment Evaluation, Education, & procurement, Home Management Training, Endurance Training  AM-PAC Score        AM-PAC Inpatient Daily Activity Raw Score: 21 (10/01/21 1550)  AM-PAC Inpatient ADL T-Scale Score : 44.27 (10/01/21 1550)  ADL Inpatient CMS 0-100% Score: 32.79 (10/01/21 1550)  ADL Inpatient CMS G-Code Modifier : Kathryn Nguyen (10/01/21 1550)    Goals  Short term goals  Time Frame for Short term goals: By discharge pt will:  Short term goal 1: Demo bed mobility IND with HOB flat to mimic home setup  Short term goal 2: Demo +10 minutes of dynamic sitting balance throughout ADL tasks with SUP  Short term goal 3: Demo UB ADLs with Mod IND  Short term goal 4: Demo LB ADLs with SUP, use of DME PRN  Short term goal 5: Demo use of pursed lip breathing technique IND throughout all functional tasks PRN with 0 VCs for intiation  Short term goal 6: Demo functional transfers with SUP, using LRD PRN (Goal added by Maikol Briscoe OTR/L on 9/24/2021)  Short term goal 7: Demo functional mobility with SUP, using LRD PRN (Goal added by Maikol Briscoe OTR/L on 9/24/2021)  Short term goal 8: Demo +15 minutes of standing tolerance during functional/ADL tasks with SBA(Goal added by Maikol Briscoe OTR/L on 9/24/2021)       Therapy Time   Individual Concurrent Group Co-treatment   Time In 1321         Time Out 1346         Minutes 25         Timed Code Treatment Minutes: 25 Minutes    Pt in chair upon arrival, pleasant and agreeable to tx this date. Pt retired to chair at end of session, call light within reach, RN notified.      SOTERO Chris/FARRUKH

## 2021-10-01 NOTE — PLAN OF CARE
Problem: Airway Clearance - Ineffective  Goal: Achieve or maintain patent airway  10/1/2021 1233 by Gaurang Mathias RCP  Outcome: Ongoing     Problem: Gas Exchange - Impaired  Goal: Absence of hypoxia  10/1/2021 1233 by Gaurang Mathias RCP  Outcome: Ongoing  Goal: Promote optimal lung function  10/1/2021 1233 by Gaurang Mathias RCP  Outcome: Ongoing     Problem: Breathing Pattern - Ineffective  Goal: Ability to achieve and maintain a regular respiratory rate  10/1/2021 1807 by Bessy Rivera RN  Outcome: Ongoing  10/1/2021 1233 by Gaurang Mathias RCP  Outcome: Ongoing     Problem: Risk for Fluid Volume Deficit  Goal: Maintain normal heart rhythm  Outcome: Ongoing     Problem: Fatigue  Goal: Verbalize increase energy and improved vitality  Outcome: Ongoing  Note: Pt sat up in chair for most of day shift and able to tolerate well. RN will continue to monitor.       Problem: OXYGENATION/RESPIRATORY FUNCTION  Goal: Patient will achieve/maintain normal respiratory rate/effort  Description: Respiratory rate and effort will be within normal limits for the patient  10/1/2021 1233 by Gaurang Mathias RCP  Outcome: Ongoing     Problem: SKIN INTEGRITY  Goal: Skin integrity is maintained or improved  10/1/2021 1233 by Gaurang Mathias RCP  Outcome: Ongoing     Problem: RESPIRATORY  Intervention: Respiratory assessment  10/1/2021 1233 by Gaurang Mathias RCP  Note:   PROVIDE ADEQUATE OXYGENATION WITH ACCEPTABLE SP02/ABG'S    [x]  IDENTIFY APPROPRIATE OXYGEN THERAPY  [x]   MONITOR SP02/ABG'S AS NEEDED   [x]   PATIENT EDUCATION AS NEEDED

## 2021-10-01 NOTE — PLAN OF CARE
Problem: Airway Clearance - Ineffective  Goal: Achieve or maintain patent airway  10/1/2021 0009 by Melany Zavala RCP  Outcome: Ongoing     Problem: Gas Exchange - Impaired  Goal: Absence of hypoxia  10/1/2021 0009 by Melany Zavala RCP  Outcome: Ongoing     Problem: Gas Exchange - Impaired  Goal: Promote optimal lung function  10/1/2021 0009 by Melany Zavala RCP  Outcome: Ongoing     Problem: Breathing Pattern - Ineffective  Goal: Ability to achieve and maintain a regular respiratory rate  10/1/2021 0009 by Melany Zavala RCP  Outcome: Ongoing   PROVIDE ADEQUATE OXYGENATION WITH ACCEPTABLE SP02/ABG'S    [x]  IDENTIFY APPROPRIATE OXYGEN THERAPY  [x]   MONITOR SP02/ABG'S AS NEEDED   [x]   PATIENT EDUCATION AS NEEDED     NON INVASIVE VENTILATION    PROVIDE OPTIMAL VENTILATION/ACCEPTABLE SP02  IMPLEMENT NON INVASIVE VENTILATION PROTOCOL  ASSESSMENT SKIN INTEGRITY  PATIENT EDUCATION AS NEEDED  BIPAP AS NEEDED

## 2021-10-02 NOTE — PROGRESS NOTES
PULMONARY & CRITICAL CARE MEDICINE PROGRESS NOTE     Patient:  Godwin Soriano  MRN: [id number]  Admit date: 9/21/2021  Primary Care Physician: Mort Boas  Consulting Physician: Leonid Vora DO  CODE Status: Full Code  LOS: 11     SUBJECTIVE     CHIEF COMPLAINT/REASON FOR INITIAL CONSULT:   Acute respiratory failure/COVID-19 pneumonia    BRIEF HOSPITAL COURSE:   The patient is a 45 y.o. female history of diabetes mellitus, hypertension, morbid obesity  unvaccinated was admitted with shortness of breath and diagnosed with COVID-19 pneumonia. She apparently was diagnosed about 4 days ago. There was associated cough that is mostly dry and also having some diarrhea. Patient required increasing oxygen with initial oxygen saturation being in the 80s. No previous history of lung disease  Patient is a non-smoker    INTERVAL HISTORY:  10/02/21  Pt was seen and examined at bedside. Overnight events noted, chart seen labs seen and medications reviewed. She is afebrile in last 24-hour T-max is 98.1. Her respiratory rate remains in mid to high 20s no acute hypoxic worsening episodes reported. She remains on high flow nasal cannula and this morning 80% and 50 L saturating above 90%. According to patient she does have cough she does have sputum production with greenish sputum she think that her shortness of breath is getting better she is able to mobilize to bedside commode. She did use BiPAP overnight BiPAP was at 65%. Urine output reported to be 1800 mL in last 24 hours. BUN is 26 creatinine 0.75 and bicarbonate 28 potassium 4.1 WBC is 11.9 and hemoglobin 15.7. REVIEW OF SYSTEMS:  Review of Systems   Constitutional: Positive for fatigue. Negative for appetite change and fever. HENT: Negative for postnasal drip, rhinorrhea, sore throat, trouble swallowing and voice change. Eyes: Negative for redness and visual disturbance. Respiratory: Positive for cough and shortness of breath.  Negative for wheezing. Cardiovascular: Negative for chest pain, palpitations and leg swelling. Gastrointestinal: Negative for abdominal pain, constipation, diarrhea, nausea and vomiting. Endocrine: Negative for polyuria. Genitourinary: Negative for dysuria, frequency, hematuria and urgency. Musculoskeletal: Negative. Allergic/Immunologic: Negative. Neurological: Negative for dizziness, syncope, speech difficulty and headaches. Hematological: Negative for adenopathy. Does not bruise/bleed easily. Psychiatric/Behavioral: Negative. pulm  OBJECTIVE     PaO2/FiO2 RATIO:  No results for input(s): POCPO2 in the last 72 hours. FiO2 : 80 %     VITAL SIGNS:   LAST:  /81   Pulse 88   Temp 97.5 °F (36.4 °C) (Axillary)   Resp 26   Ht 5' 3\" (1.6 m)   Wt 254 lb 3.1 oz (115.3 kg)   SpO2 95%   BMI 45.03 kg/m²   8-24 HR RANGE:  TEMP Temp  Av.8 °F (36.6 °C)  Min: 97.3 °F (36.3 °C)  Max: 98.1 °F (95.1 °C)   BP Systolic (45SIT), QRK:112 , Min:99 , TQK:591      Diastolic (01EJX), GIN:26, Min:63, Max:81     PULSE Pulse  Av.9  Min: 64  Max: 103   RR Resp  Av.2  Min: 23  Max: 30   O2 SAT SpO2  Av.8 %  Min: 93 %  Max: 97 %   OXYGEN DELIVERY O2 Flow Rate (L/min)  Av.5 L/min  Min: 50 L/min  Max: 60 L/min        SYSTEMIC EXAMINATION:   General appearance - Ill-appearing, mild to moderate respiratory distress  Mental status - awake & alert, follows commands  Eyes - pupils equal and reactive, sclera anicteric  Mouth - mucous membranes moist, pharynx normal without lesions. Large tongue Mallampati 2  Neck -Short thick neck supple, no significant adenopathy, carotids upstroke normal bilaterally, no bruits  Chest - There is no intercostal recession or use of accessory muscles. Breath sounds bilaterally were dimnished to auscultation at bases. There were mild crackles present at bases.   No expiratory wheezing and no rhonchi  Heart - normal rate, regular rhythm, normal S1, S2, no murmurs, rubs, clicks or gallops  Abdomen - soft, nontender, nondistended, no masses or organomegaly  Neurological - non-focal  Extremities - peripheral pulses normal, mild bilateral pedal edema, no clubbing or cyanosis. No calf tenderness  Skin - normal coloration and turgor, no rashes, no suspicious skin lesions noted     DATA REVIEW     Medications: Current Inpatient  Scheduled Meds:   dexamethasone  10 mg IntraVENous Daily    furosemide  20 mg IntraVENous Daily    insulin glargine  50 Units SubCUTAneous BID    vitamin C  500 mg Oral BID    fluticasone  1 spray Each Nostril Daily    cetirizine  10 mg Oral Daily    insulin lispro  0-18 Units SubCUTAneous TID WC    insulin lispro  0-9 Units SubCUTAneous Nightly    famotidine  20 mg Oral Daily    aspirin  81 mg Oral Daily    ferrous sulfate  325 mg Oral Daily with breakfast    sodium chloride flush  5-40 mL IntraVENous 2 times per day    enoxaparin  30 mg SubCUTAneous BID    Vitamin D  2,000 Units Oral Daily     Continuous Infusions:   dextrose      sodium chloride 25 mL (09/22/21 1822)       INPUT/OUTPUT:  In: 640 [P.O.:640]  Out: 1900 [Urine:1900]  Date 10/02/21 0000 - 10/02/21 2359   Shift 5156-7443 9934-1555 9139-1281 24 Hour Total   INTAKE   Shift Total(mL/kg)       OUTPUT   Urine(mL/kg/hr)  1100  1100   Shift Total(mL/kg)  1100(9.5)  1100(9.5)   Weight (kg) 115.3 115.3 115.3 115.3        LABS:  ABGs:   No results for input(s): POCPH, POCPCO2, POCPO2, POCHCO3, DBYJ3LLH in the last 72 hours. CBC:   Recent Labs     09/30/21  0507 10/02/21  0547   WBC 10.8 11.9*   HGB 15.2* 15.7*   HCT 45.1 45.7   MCV 87.4 85.4    216   LYMPHOPCT 10* 12*   RBC 5.16* 5.35*   MCH 29.5 29.3   MCHC 33.7 34.4   RDW 13.2 13.3     CRP:   Recent Labs     09/30/21  0507   CRP <3.0     LDH:   No results for input(s): LDH in the last 72 hours.   BMP:   Recent Labs     09/30/21  0507 10/02/21  0547    138   K 4.0 4.1    97*   CO2 24 28   BUN 30* 26*   CREATININE 0.56 0.75 GLUCOSE 194* 194*     Liver Function Test:   No results for input(s): PROT, LABALBU, ALT, AST, GGT, ALKPHOS, BILITOT in the last 72 hours. Coagulation Profile:   No results for input(s): INR, PROTIME, APTT in the last 72 hours. D-Dimer:  No results for input(s): DDIMER in the last 72 hours. Ferritin:    No results for input(s): FERRITIN in the last 72 hours. Lactic Acid:  No results for input(s): LACTA in the last 72 hours. Cardiac Enzymes:  No results for input(s): CKTOTAL, CKMB, CKMBINDEX, TROPONINI in the last 72 hours. Invalid input(s): TROPONIN, HSTROP  BNP/ProBNP:   No results for input(s): BNP, PROBNP in the last 72 hours. Triglycerides:  No results for input(s): TRIG in the last 72 hours. Microbiology:  Urine Culture:  No components found for: CURINE  Blood Culture:  No components found for: CBLOOD, CFUNGUSBL  Sputum Culture:  No components found for: CSPUTUM  No results for input(s): SPECDESC, SPECIAL, CULTURE, STATUS, ORG, CDIFFTOXPCR, CAMPYLOBPCR, SALMONELLAPC, SHIGAPCR, SHIGELLAPCR, MPNEUG, MPNEUM, LACTOQL in the last 72 hours. No results for input(s): SPUTUM, SPECDESC, SPECIAL, CULTURE, STATUS, ORG, CDIFFTOXPCR, MPNEUM, MPNEUG in the last 72 hours. Invalid input(s): CURINE, CBLOOD, CFUNGUSBL     Pathology:    Radiology Reports:  XR CHEST PORTABLE   Final Result   Limited exam, findings suggest progression in extensive bilateral coarse   alveolar infiltrates suggesting progression in multifocal pneumonia in COVID   positive patient         XR CHEST (SINGLE VIEW FRONTAL)   Final Result   Bilateral diffuse pulmonary edema and or pneumonia without definite effusion. No cardiomegaly was identified. Slight improvement bilaterally has occurred from 09/21/2021. XR CHEST PORTABLE   Final Result   Multifocal airspace opacities worrisome multifocal atypical viral pneumonia. Low lung volumes              Echocardiogram:   No results found for this or any previous visit. ASSESSMENT AND PLAN     Assessment:    // Acute hypoxic respiratory failure  // Acute respiratory distress syndrome  // Bilateral multifocal pneumonia due to COVID 19 infection  // Diabetes mellitus.  // Morbid obesity  // Likely obstructive sleep apnea/hypoventilation  // Hypertension  // E. coli in urine    Plan:    -Currently on high flow nasal cannula continued need of high flow nasal cannula and BiPAP saturating above 90% on 80% and 50 L  -She did require and continued on nocturnal BiPAP and intermittently during the daytime yesterday during the daytime she was able to tolerate high flow nasal cannula without the need of BiPAP. -She is getting Lasix 20 mg IV daily urine output is good and will continue.  -Monitor urine output renal function and electrolytes  - Daily CRP: 9/21 - 205, 128, 47, <3  - Tested positive:  9/17/21  - Symptom onset:  9/16/21  - Out of Isolation: No  - Vaccinated: No  - Remdesivir: 9/22  - Decadron: 9/21, 9/22, 9/23, 9/24, 9/25, 9/26, 9/27, 9/28, 9/29, 9/30 - course completed  - Actemra: 9/22/21  - Co-infection: No. Asymptomatic bacteruria E Coli in urine.  - Fluid Balance: euvolemic  - Glycemic Control: Per primary team.     Discussed with respiratory therapist treatment plan discussed  Discussed with nursing staff. Total critical care time caring for this patient with life threatening, unstable organ failure, including direct patient contact, management of life support systems, review of data including imaging and labs, discussions with other team members and physicians at least 27  Min so far today, excluding procedures. Cara Valerio MD  10/2/2021 12:58 PM  [address], [place]       This patient was evaluated in the context of the global SARS-CoV-2 (COVID-19) pandemic, which necessitated considerations that the patient either has COVID-19 infection or is at risk of infection with COVID-19.  Institutional protocols and algorithms that pertain to the evaluation & management of patients with COVID-19 or those at risk for COVID-19 are in a state of rapid changes based on information released by regulatory bodies including the CDC and federal and state organizations. These policies and algorithms were followed during the patient's care. Please note that this chart was generated using voice recognition Dragon dictation software. Although every effort was made to ensure the accuracy of this automated transcription, some errors in transcription may have occurred.

## 2021-10-02 NOTE — PROGRESS NOTES
[facility]  Office: [address], DO, Rogerio Kelly, DO, Georgina Lucero, DO, Marmiriam Red Blood, DO, Luis Antonio Henson MD, Cb Fish MD, Sunshine Rowland MD, Marilyn Bardales MD, Kimberly Lerner MD, Chery Zuluaga MD, Flori Krueger MD, Claudia Justin, DO, Elkin Marie, DO, Rani Aguilera MD,  Brooks Berger, DO, Eulalia Patel MD, Liane Gutiérrez MD, Fransisca Brian MD, Baldomero Larsen MD, , Ana Ortega MD, Zena Coronado MD, Luis Silva MD, James Betancourt Husbands Murray, [facility], Александр Ya, CNP, Lien Jimenez, CNS, Vin Gomez, CNP, Estefany Lyman, CNP, Suzanna Stone, CNP, Kerline Rm, CNP, Edmundo Clayton, CNP, Willard England PA-C, Shanita Ludwig, [facility], Jason Islas, CNP, Анна Thompson, CNP, Delvin Grover, [facility], Kentrell Barahona, CNP, Nedra Kee, CNP, Melissa Cohen, CNP, Frieda Lundberg, CNP, Eran Villafana, [address] [place] [address]    Progress Note    10/2/2021    2:45 PM    Name:   Kell Denton  MRN:     [id number]     Acct:      [de-identified]   Room:   [address] [place] [address] Day:  6  Admit Date:  9/21/2021  8:43 PM    PCP:   Olimpia Teixeira  Code Status:  Full Code    Subjective:     C/C:   Chief Complaint   Patient presents with   [employer] Other     COVID+    Shortness of Breath     Interval History Status: Improved    Pt seen and examined this afternoon. No acute events overnight. Patient remains on high flow nasal cannula. Down to 65% FiO2 on Bipap, 80% on HFNC. Ear fullness/pain is now intermittent since starting the flonase. Glucoses are better today. Brief History:     Kell Denton is a 45 y.o. Non- / non  female who presents with Other (COVID+) and Shortness of Breath   and is admitted to the hospital for the management of Covid 19 Pneumonia      Patient presented to the emergency room for the concern of worsening COVID-19 Symptoms.   Patient was originally seen Patient was originally diagnosed with the COVID 4 days prior. Patient states since diagnosis she has been having increased shortness of breath, cough  With water-like diarrhea. Upon arrival to the emergency room patient was noted to be hypoxic with saturations in the 80's  she required 6 lpm nc supplemental oxygen. Was seen by infectious disease, started on high-dose steroids, Actemra was given on 9/22/2021. Patient however continue to require more oxygen. She has been feeling fine for the past couple days but still requires 90% FiO2 to maintain oxygen of 91 to 90%. She still feels very winded with ambulation.      Did not receive the vaccine because she felt it was \"rushed. \"    - Uptrendning oxygen requirements prompting transfer to [facility] ICU overnight on 9/27-9/28.    - Slowly weaning. Likely LTAC. Review of Systems:     Constitutional:  negative for chills, fevers, sweats, + fatigue  HEENT: reports intermittent left ear fullness. Respiratory:  +cough, +dyspnea on exertion, slight improvement of shortness of breath,  No wheezing  Cardiovascular:  negative for chest pain, chest pressure/discomfort, lower extremity edema, palpitations  Gastrointestinal:  negative for abdominal pain, constipation, diarrhea, nausea, vomiting  Neurological:  negative for dizziness, headache    Medications: Allergies:     Allergies   Allergen Reactions    Cephalexin      Gets yeast infection    Codeine        Current Meds:   Scheduled Meds:    dexamethasone  10 mg IntraVENous Daily    furosemide  20 mg IntraVENous Daily    insulin glargine  50 Units SubCUTAneous BID    vitamin C  500 mg Oral BID    fluticasone  1 spray Each Nostril Daily    cetirizine  10 mg Oral Daily    insulin lispro  0-18 Units SubCUTAneous TID WC    insulin lispro  0-9 Units SubCUTAneous Nightly    famotidine  20 mg Oral Daily    aspirin  81 mg Oral Daily    ferrous sulfate  325 mg Oral Daily with breakfast    sodium chloride flush  5-40 mL IntraVENous 2 times per day  enoxaparin  30 mg SubCUTAneous BID    Vitamin D  2,000 Units Oral Daily     Continuous Infusions:    dextrose      sodium chloride 25 mL (21 1822)     PRN Meds: albuterol sulfate HFA **AND** ipratropium, dextromethorphan-guaiFENesin, glucose, glucagon (rDNA), dextrose, ALPRAZolam, potassium chloride **OR** potassium alternative oral replacement **OR** potassium chloride, glucose, dextrose, glucagon (rDNA), magnesium sulfate, sodium phosphate IVPB **OR** sodium phosphate IVPB **OR** sodium phosphate IVPB, sodium chloride flush, sodium chloride, ondansetron **OR** ondansetron, magnesium hydroxide, acetaminophen **OR** acetaminophen    Data:     Past Medical History:   has a past medical history of Anemia, Chronic back pain, [facility] mellitus [facility] H/O LEEP, Hypertension, Obesity, morbid, BMI 40.0-49.9 ([facility] and S/P endometrial ablation. Social History:   reports that she has never smoked. She has never used smokeless tobacco. She reports current alcohol use. She reports that she does not use drugs. Family History:   Family History   Problem Relation Age of Onset    Hypotension Mother     Heart Disease Father     Heart Failure Father        Vitals:  /78   Pulse 96   Temp 97.5 °F (36.4 °C) (Axillary)   Resp (!) 35   Ht 5' 3\" (1.6 m)   Wt 254 lb 3.1 oz (115.3 kg)   SpO2 92%   BMI 45.03 kg/m²   Temp (24hrs), Av.8 °F (36.6 °C), Min:97.3 °F (36.3 °C), Max:98.1 °F (36.7 °C)    Recent Labs     10/01/21  1127 10/01/21  1637 10/01/21  1922 10/02/21  0822   POCGLU 106* 403* 446* 163*       I/O (24Hr):     Intake/Output Summary (Last 24 hours) at 10/2/2021 1445  Last data filed at 10/2/2021 1200  Gross per 24 hour   Intake 640 ml   Output 1900 ml   Net -1260 ml       Labs:  Hematology:  Recent Labs     21  0507 10/02/21  0547   WBC 10.8 11.9*   RBC 5.16* 5.35*   HGB 15.2* 15.7*   HCT 45.1 45.7   MCV 87.4 85.4   MCH 29.5 29.3   MCHC 33.7 34.4   RDW 13.2 13.3    216   MPV 11.2 12.0   CRP <3.0  --      Chemistry:  Recent Labs     09/30/21  0507 10/02/21  0547    138   K 4.0 4.1    97*   CO2 24 28   GLUCOSE 194* 194*   BUN 30* 26*   CREATININE 0.56 0.75   ANIONGAP 13 13   LABGLOM >60 >60   GFRAA >60 >60   CALCIUM 8.7 8.8     Recent Labs     10/01/21  0810 10/01/21  0903 10/01/21  1127 10/01/21  1637 10/01/21  1922 10/02/21  0822   POCGLU 56* 86 106* 403* 446* 163*         Radiology:  XR CHEST PORTABLE    Result Date: 9/21/2021  Multifocal airspace opacities worrisome multifocal atypical viral pneumonia. Low lung volumes       Physical Examination:        General appearance:  alert, cooperative, ill-appearing, obese  female sitting up in bed. Mental Status:  oriented to person, place and time and normal affect  HEENT: High flow nasal cannula present, EOMI, sclera are anti-icteric  Lungs: Tachypneic, diminished posteriorly, low inspiratory effort, deep breathing induces a couh  Heart:  regular rate and rhythm, no murmur  Abdomen: protuberant, soft, nontender, nondistended, normal bowel sounds, no masses, hepatomegaly, splenomegaly  Extremities:  no edema, redness, tenderness in the calves  Skin:  no gross lesions, rashes, induration    Assessment:        Hospital Problems         Last Modified POA    * (Principal) Pneumonia due to COVID-19 virus 9/28/2021 Yes    COVID-19 9/30/2021 Yes    Acute respiratory failure with hypoxia (HCC) 9/28/2021 Yes    Essential hypertension 9/28/2021 Yes    Elevated C-reactive protein (CRP) 9/28/2021 Yes    Type 2 diabetes mellitus with hyperglycemia, with long-term current use of insulin ([facility] 9/28/2021 Yes    Obesity, morbid, BMI 40.0-49.9 ([facility] 9/28/2021 Yes          Plan:        COVID-19 viral pneumonia:   - Infectious disease following   - Oxygen requirements are currently stable; alternate high flow nasal cannula and BiPAP therapy.   - Received 5 days of Decadron 20 mg. Currently on day #4 of 10 mg Decadron  - Actemra received 9/22  - Vitamin C, vitamin D  - DVT ppx - Lovenox    Acute hypoxic respiratory failure:   - On high flow nasal cannula. Weaned down to 80% HFNC / 65% Bipap  - slowly improving  - Pulmonology following  - Continue combivent scheduled  - IS/acapella  - Continue Lasix 20 mg daily     Sepsis from covid: Secondary to #1    Left ear fullness - continue flonase    Essential hypertension: Currently stable off of meds. Diabetes mellitus type 2:   - Continue Lantus to 50 units BID with ISS mealtime coverage. Glucoses are currently controlled. - Decrease dose to 40 units BID starting tonight    Morbid obesity (BMI 45.69): Weight loss encouraged    GI/DVT prophylaxis: Pepcid, Lovenox twice daily    Labs reviewed  Imaging personally reviewed - CXR with diffuse bilateral hazy infiltrates. Slightly worse today. Continue slow oxygen wean. Patient is hopefully plateauing now. Clinically, the patient is unchanged today. Slowly weaning oxygen. Need to provide medical letter for court. She has a court date next week and will be unable to attend.     33 Kay Bella DO  10/2/2021  2:45 PM

## 2021-10-02 NOTE — PLAN OF CARE
Problem: Airway Clearance - Ineffective  Goal: Achieve or maintain patent airway  Outcome: Ongoing     Problem: Gas Exchange - Impaired  Goal: Absence of hypoxia  Outcome: Ongoing     Problem: Gas Exchange - Impaired  Goal: Promote optimal lung function  Outcome: Ongoing     Problem: Breathing Pattern - Ineffective  Goal: Ability to achieve and maintain a regular respiratory rate  Outcome: Ongoing     Problem:  Body Temperature -  Risk of, Imbalanced  Goal: Ability to maintain a body temperature within defined limits  Outcome: Ongoing   Electronically signed by Almita Escoto RN on 10/2/2021 at 2:31 PM

## 2021-10-02 NOTE — PROGRESS NOTES
[facility]  Progress Note    Karlie Maya  MRN: [id number]  Date: 10/2/2021  LOS: 6     Reason for F/U :   COVID-19 virus pneumonia    Impression :   COVID-19 virus infection tested + 9/17/2021  Acute hypoxic respiratory failure  Elevated inflammatory marker with CRP greater than 200  Responded well to medical treatment  Asymptomatic bacteriuria with E. coli isolated in the urine  Morbid obesity  Diabetes mellitus type 2    Recommendations:   Continue high-dose Decadron and received 20 mg IV daily through 9/26/2021 and now down to 10 mg IV daily for 5 days   The patient received a dose of Actemra 9/22/2021  Clinically the patient is doing much better but she continues to require significant oxygen support. Continue supportive therapy    Infection Control Recommendations:   Droplet plus precautions    Discharge Planning:   Patient will need Midline Catheter Insertion/ PICC line Insertion: No  Patient will need: Home IV , [place],  SNF,  LTAC: Undetermined  Patient willneed outpatient wound care: No    Medical Decision making / Summary of Stay:   Karlie Maya is a 45y.o.-year-old female who was initially admitted on 9/21/2021. Prabhakar Beauchamp is an unvaccinated 67-year-old woman with a history of diabetes mellitus type 2, hypertension, chronic back pain, anemia and morbid obesity among other medical problems. She reports that on Tuesday, September 13, 2021 she started having symptoms with a cough, headache, generalized malaise. She does not report any subjective fevers, chills or sweats but subsequently started to develop some shortness of breath as well as some diarrhea. The diarrhea though she could not quite say if it was related to this illness as she does have irritable bowel syndrome and intermittently does have diarrhea. The patient did subsequently have COVID-19 testing done on September 17, 2021 and was found to be Covid positive.   By Saturday she reports that she was having significant episodes of shortness of breath which prompted her to come into the emergency department for evaluation.     In the ED he was noted to be hypoxic with saturations in the 80s and was started on 6 L of oxygen by nasal cannula and due to worsening hypoxia the patient was placed on high flow O2 by nasal cannula at 40 L and 100% and was also placed on a nonrebreather. The BiPAP was also ordered and the patient was seen by the pulmonology service earlier today. The patient had initially been started on remdesivir and Decadron and the Decadron dose was increased to high-dose Decadron today. The patient was given Actemra 2021    CRP is continued to trend down and the patient has responded well to therapy. Chest x-ray 2021 showed improved airspace opacity      Current evaluation:10/2/2021    /75   Pulse 87   Temp 97.3 °F (36.3 °C) (Axillary)   Resp 24   Ht 5' 3\" (1.6 m)   Wt 254 lb 3.1 oz (115.3 kg)   SpO2 93%   BMI 45.03 kg/m²     Temperature Range: Temp: 97.3 °F (36.3 °C) Temp  Av.9 °F (36.6 °C)  Min: 97.3 °F (36.3 °C)  Max: 98.1 °F (36.7 °C)      Afebrile  VS stable    The patient is oxygenating well on high flow NC at 80% FiO2 and 50 L/min    Actemra administered on 21    RR: 22  SpO2: 96     Labs 10/2/2021  BUN: 26  Creat: 0.75    WBC: 11.9  Hgb: 15.7  Plt: 216    Review of Systems   Constitutional: Negative. Respiratory: Positive for shortness of breath. Cardiovascular: Negative. Gastrointestinal: Negative. Genitourinary: Negative. Musculoskeletal: Negative. Skin: Negative. Neurological: Negative. Psychiatric/Behavioral: Negative. Physical Examination :     Physical Exam  Constitutional:       Appearance: She is well-developed. She is obese. HENT:      Head: Normocephalic and atraumatic. Cardiovascular:      Rate and Rhythm: Regular rhythm. Heart sounds: Normal heart sounds. Pulmonary:      Breath sounds: Rales present. Abdominal:      General: Bowel sounds are normal.      Palpations: Abdomen is soft. Musculoskeletal:      Cervical back: Neck supple. Skin:     General: Skin is warm and dry. Neurological:      Mental Status: She is alert and oriented to person, place, and time. Laboratory data:   I have independently reviewed the followinglabs:  CBC with Differential:   Recent Labs     09/30/21  0507 10/02/21  0547   WBC 10.8 11.9*   HGB 15.2* 15.7*   HCT 45.1 45.7    216   LYMPHOPCT 10* 12*   MONOPCT 3 4     BMP:   Recent Labs     09/30/21  0507 10/02/21  0547    138   K 4.0 4.1    97*   CO2 24 28   BUN 30* 26*   CREATININE 0.56 0.75     Hepatic Function Panel:   No results for input(s): PROT, LABALBU, BILIDIR, IBILI, BILITOT, ALKPHOS, ALT, AST in the last 72 hours. Lab Results   Component Value Date    PROCAL 0.09 09/24/2021    PROCAL 0.13 09/21/2021     Lab Results   Component Value Date    CRP <3.0 09/30/2021    CRP 47.2 09/24/2021    .3 09/23/2021     No results found for: Fuad Chowdhury      Lab Results   Component Value Date    DDIMER 0.54 09/21/2021     Lab Results   Component Value Date    FERRITIN 706 09/22/2021    FERRITIN 756 09/21/2021     No results found for: LDH  Lab Results   Component Value Date    FIBRINOGEN 583 09/21/2021       Results in Past 30 Days  Result Component Current Result Ref Range Previous Result Ref Range   SARS-CoV-2, Rapid DETECTED (A) (9/21/2021) Not Detected Not in Time Range      Lab Results   Component Value Date    COVID19 DETECTED 09/21/2021       No results [facility] input[facility]s): [facility] in the last 72 hours.     Imaging Studies:   ONE XRAY VIEW OF THE CHEST 9/29/2021 6:25 am  Impression   Limited exam, findings suggest progression in extensive bilateral coarse   alveolar infiltrates suggesting progression in multifocal pneumonia in COVID   positive patient       Cultures:     Culture, Urine [[id number]] (Abnormal) Collected: 09/22/21 1925   Order Status: Completed Specimen: Urine Random Updated: 09/23/21 1436    Specimen Description . Random Urine    Special Requests NOT REPORTED    Culture ESCHERICHIA COLI 50 to 100,000 CFU/MLAbnormal    Escherichia coli (1)    Antibiotic Interpretation DEBBY Status    amikacin  NOT REPORTED Final    ampicillin Sensitive 4 Final    ampicillin-sulbactam  NOT REPORTED Final    aztreonam Sensitive <=1 Final    ceFAZolin Sensitive <=4 Final    ceFAZolin Sensitive Cefazolin sensitivity results can be used to predict the effectiveness of oral cephalosporins (eg. Cephalexin) in uncomplicated Urinary Tract Infections due to E. coli, K. pneumoniae, and P. mirabilis Final    cefepime  NOT REPORTED Final    cefTRIAXone Sensitive <=1 Final    ciprofloxacin Sensitive <=0.25 Final    ertapenem  NOT REPORTED Final    Confirmatory Extended Spectrum Beta-Lactamase Negative NEGATIVE Final    gentamicin Sensitive <=1 Final    meropenem  NOT REPORTED Final    nitrofurantoin Sensitive <=16 Final    tigecycline  NOT REPORTED Final    tobramycin Sensitive <=1 Final    trimethoprim-sulfamethoxazole Sensitive <=20 Final    piperacillin-tazobactam Sensitive <=4 Final        COVID-19, Rapid [[id number]] (Abnormal) Collected: 09/21/21 2123   Order Status: Completed Specimen: Nasopharyngeal Swab Updated: 09/21/21 2154    Specimen Description . NASOPHARYNGEAL SWAB    SARS-CoV-2, Rapid DETECTEDAbnormal     Comment:        Rapid NAAT: The specimen is POSITIVE for SARS-Cov-2, the novel coronavirus associated with   COVID-19.         This test has been authorized by the FDA under an Emergency Use Authorization (EUA) for use   by authorized laboratories.         The ID NOW COVID-19 assay is designed to detect the virus that causes COVID-19 in patients   with signs and symptoms of infection who are suspected of COVID-19.    An individual without symptoms of COVID-19 and who is not shedding SARS-CoV-2 virus would   expect to have a negative (not detected) result in this assay. Fact sheet for Healthcare Providers: Ping   Fact sheet for Patients: [email or web address]           Methodology: Isothermal Nucleic Acid Amplification         Results reported to the appropriate Health Department               Medications:      dexamethasone  10 mg IntraVENous Daily    furosemide  20 mg IntraVENous Daily    insulin glargine  50 Units SubCUTAneous BID    vitamin C  500 mg Oral BID    fluticasone  1 spray Each Nostril Daily    cetirizine  10 mg Oral Daily    insulin lispro  0-18 Units SubCUTAneous TID WC    insulin lispro  0-9 Units SubCUTAneous Nightly    famotidine  20 mg Oral Daily    aspirin  81 mg Oral Daily    ferrous sulfate  325 mg Oral Daily with breakfast    sodium chloride flush  5-40 mL IntraVENous 2 times per day    enoxaparin  30 mg SubCUTAneous BID    Vitamin D  2,000 Units Oral Daily           [facility]  SONJA Wilson CNP  Perfect [employer] messaging  OFFICE: [phone]      Electronically signed by SONJA Bishop CNP on 10/2/2021 at 11:10 AM  Thank you for allowing us to participate in the care of this patient. Please call with questions. This note iscreated with the assistance of a speech recognition program.  While intending to generate a document that actually reflects the content of the visit, the document can still have some errors including those of syntax andsound a like substitutions which may escape proof reading. In such instances, actual meaning can be extrapolated by contextual diversion. ATTESTATION:    I have discussed the case, including pertinent history and exam findings with the APRN. I have evaluated the  History, physical findings and pictures of the patient and the key elements of the encounter have been performed by me. I have reviewed the laboratory data, other diagnostic studies and discussed them with the APRN.  I have updated the medical record where necessary. I agree with the assessment, plan and orders as documented by the APRN.     Scot Kincaid MD.

## 2021-10-02 NOTE — PROGRESS NOTES
[facility]  Office: [address], , [facility] Jasiele, DO, Taras Wang, DO, Regina Pickett Blood, DO, Ny Mohan MD, Jake Murrieta MD, Lacey Woodard MD, Jarocho Nicole MD, Quincy Brown MD, Marquise Scales MD, Dylan Jacobson MD, Dawn Blackman, DO, Shell Nanci, DO, Jf Alberto MD,  Lamar Cee, DO, Yuliya Lutz MD, Yonathan Yanes MD, Alem Dave MD, Shira Briceño MD, , Krystyna Lino MD, Pastora Pacheco MD, Umair Mendes MD, Lee García, [facility], [facility], CNP, Vikas Moraes, CNP, Lamar Vásquez, CNS, Leah Estes, CNP, Vick Gleason, CNP, Avery Pearson, CNP, Jesus Guzman, CNP, Princess Henao, CNP, Marycarmen Sanabria PA-C, Latonia Lang, [facility], Momo Noel, CNP, Kendell Guerrero, CNP, Kaley Chavez, CNP, Octavia Irving, CNP, Marleny Oh, CNP, Cherri Rudolph, CNP, Clementina Mcintosh, CNP, Aubrie Newsome, [address] [place] [address]    Progress Note    10/1/2021    8:41 PM    Name:   Isaac Sesay  MRN:     [id number]     Acct:      [de-identified]   Room:   [address] [place] [address] Day:  10  Admit Date:  9/21/2021  8:43 PM    PCP:   Bria Lopez  Code Status:  Full Code    Subjective:     C/C:   Chief Complaint   Patient presents with   [place]on Camara Other     COVID+    Shortness of Breath     Interval History Status: Improved    Pt seen and examined this afternoon. No acute events overnight. Patient remains on high flow nasal cannula. Still requiring 95% FiO2. She is sitting up in the chair. Reports that her appetite is good. She was hypoglycemic this morning. Did not receive her morning Lantus. Subsequently became hyperglycemic this afternoon. Brief History:     Isaac Sesay is a 45 y.o.  Non- / non  female who presents with Other (COVID+) and Shortness of Breath   and is admitted to the hospital for the management of Covid 19 Pneumonia      Patient presented to the emergency room for the concern of worsening COVID-19 Symptoms. Patient was originally seen Patient was originally diagnosed with the COVID 4 days prior. Patient states since diagnosis she has been having increased shortness of breath, cough  With water-like diarrhea. Upon arrival to the emergency room patient was noted to be hypoxic with saturations in the 80's  she required 6 lpm nc supplemental oxygen. Was seen by infectious disease, started on high-dose steroids, Actemra was given on 9/22/2021. Patient however continue to require more oxygen. She has been feeling fine for the past couple days but still requires 90% FiO2 to maintain oxygen of 91 to 90%. She still feels very winded with ambulation.      Did not receive the vaccine because she felt it was \"rushed. \"    - Uptrendning oxygen requirements prompting transfer to [facility] ICU overnight on 9/27-9/28. Review of Systems:     Constitutional:  negative for chills, fevers, sweats, + fatigue  Respiratory:  +cough, +dyspnea on exertion, +shortness of breath,  No wheezing  Cardiovascular:  negative for chest pain, chest pressure/discomfort, lower extremity edema, palpitations  Gastrointestinal:  negative for abdominal pain, constipation, diarrhea, nausea, vomiting  Neurological:  negative for dizziness, headache    Medications: Allergies:     Allergies   Allergen Reactions    Cephalexin      Gets yeast infection    Codeine        Current Meds:   Scheduled Meds:    furosemide  20 mg IntraVENous Daily    insulin glargine  50 Units SubCUTAneous BID    vitamin C  500 mg Oral BID    fluticasone  1 spray Each Nostril Daily    cetirizine  10 mg Oral Daily    insulin lispro  0-18 Units SubCUTAneous TID WC    insulin lispro  0-9 Units SubCUTAneous Nightly    famotidine  20 mg Oral Daily    aspirin  81 mg Oral Daily    ferrous sulfate  325 mg Oral Daily with breakfast    sodium chloride flush  5-40 mL IntraVENous 2 times per day    enoxaparin  30 mg SubCUTAneous BID    Vitamin D 2,000 Units Oral Daily     Continuous Infusions:    dextrose      sodium chloride 25 mL (21 1822)     PRN Meds: albuterol sulfate HFA **AND** ipratropium, dextromethorphan-guaiFENesin, glucose, glucagon (rDNA), dextrose, ALPRAZolam, potassium chloride **OR** potassium alternative oral replacement **OR** potassium chloride, albuterol sulfate HFA, glucose, dextrose, glucagon (rDNA), magnesium sulfate, sodium phosphate IVPB **OR** sodium phosphate IVPB **OR** sodium phosphate IVPB, sodium chloride flush, sodium chloride, ondansetron **OR** ondansetron, magnesium hydroxide, acetaminophen **OR** acetaminophen    Data:     Past Medical History:   has a past medical history of Anemia, Chronic back pain, Diabetes mellitus ([facility] [address] H/O LEEP, Hypertension, Obesity, morbid, [facility] 40.0-49.9 [facility] and S/P endometrial ablation. Social History:   reports that she has never smoked. She has never used smokeless tobacco. She reports current alcohol use. She reports that she does not use drugs. Family History:   Family History   Problem Relation Age of Onset    Hypotension Mother     Heart Disease Father     Heart Failure Father        Vitals:  /63   Pulse 99   Temp 98.1 °F (36.7 °C) (Oral)   Resp 24   Ht 5' 3\" (1.6 m)   Wt 255 lb 11.7 oz (116 kg)   SpO2 96%   BMI 45.30 kg/m²   Temp (24hrs), Av.1 °F (36.7 °C), Min:97.5 °F (36.4 °C), Max:98.5 °F (36.9 °C)    Recent Labs     10/01/21  0903 10/01/21  1127 10/01/21  1637 10/01/21  1922   POCGLU 86 106* 403* 446*       I/O (24Hr):     Intake/Output Summary (Last 24 hours) at 10/1/2021 2041  Last data filed at 10/1/2021 1354  Gross per 24 hour   Intake 482 ml   Output 1500 ml   Net -1018 ml       Labs:  Hematology:  Recent Labs     21  0456 21  0507   WBC 10.3 10.8   RBC 5.19* 5.16*   HGB 15.5* 15.2*   HCT 44.9 45.1   MCV 86.5 87.4   MCH 29.9 29.5   MCHC 34.5 33.7   RDW 12.9 13.2    264   MPV 10.8 11.2   CRP  --  <3.0     Chemistry:  Recent Labs     09/29/21  0456 09/30/21  0507    137   K 3.7 4.0   CL 98 100   CO2 27 24   GLUCOSE 173* 194*   BUN 27* 30*   CREATININE 0.66 0.56   ANIONGAP 13 13   LABGLOM >60 >60   GFRAA >60 >60   CALCIUM 8.8 8.7     Recent Labs     09/30/21  2058 10/01/21  0810 10/01/21  0903 10/01/21  1127 10/01/21  1637 10/01/21  1922   POCGLU 351* 56* 86 106* 403* 446*         Radiology:  XR CHEST PORTABLE    Result Date: 9/21/2021  Multifocal airspace opacities worrisome multifocal atypical viral pneumonia. Low lung volumes       Physical Examination:        General appearance:  alert, cooperative, ill-appearing, obese  female sitting up in bed. Mental Status:  oriented to person, place and time and normal affect  HEENT: High flow nasal cannula present, EOMI, sclera are anti-icteric  Lungs: Tachypneic, diminished posteriorly, low inspiratory effort  Heart:  regular rate and rhythm, no murmur  Abdomen: protuberant, soft, nontender, nondistended, normal bowel sounds, no masses, hepatomegaly, splenomegaly  Extremities:  no edema, redness, tenderness in the calves  Skin:  no gross lesions, rashes, induration    Assessment:        Hospital Problems         Last Modified POA    * (Principal) Pneumonia due to COVID-19 virus 9/28/2021 Yes    COVID-19 9/30/2021 Yes    Acute respiratory failure with hypoxia ([address] 9/28/2021 Yes    Essential hypertension 9/28/2021 Yes    Elevated C-reactive protein (CRP) 9/28/2021 Yes    Type 2 diabetes mellitus with hyperglycemia, with long-term current use of insulin ([address] 9/28/2021 Yes    Obesity, morbid, BMI 40.0-49.9 ([address] 9/28/2021 Yes          Plan:        COVID-19 viral pneumonia:   - Infectious disease following   - Oxygen requirements are currently stable; alternate high flow nasal cannula and BiPAP therapy.   - Received 5 days of Decadron 20 mg. Currently on day #4 of 10 mg Decadron  - Actemra received 9/22  - Vitamin C, vitamin D  - DVT ppx - Lovenox    Acute hypoxic respiratory failure:   - On high flow nasal cannula % FiO2. Overall, she is feeling improved. - Pulmonology following  - Continue combivent scheduled  - IS/acapella  - Continue Lasix 20 mg daily     Sepsis from covid: Secondary to #1    Left ear fullness - continue flonase    Essential hypertension: Currently stable off of meds. Diabetes mellitus type 2:   - Continue Lantus to 50 units BID with ISS mealtime coverage. Glucoses are currently controlled. -Hypoglycemic this morning. Morning Lantus held. Now hyperglycemic. Go ahead and give Lantus 50 units this evening. Morbid obesity (BMI 45.69): Weight loss encouraged    GI/DVT prophylaxis: Pepcid, Lovenox twice daily    Labs reviewed  Imaging personally reviewed - CXR with diffuse bilateral hazy infiltrates. Slightly worse today. Continue slow oxygen wean. Patient is hopefully plateauing now. Clinically, the patient is unchanged today. Slowly weaning oxygen. Need to provide medical letter for court. She has a court date next week and will be unable to attend.     33 Kay Bella DO  10/1/2021  8:41 PM

## 2021-10-03 NOTE — PROGRESS NOTES
[facility]  Progress Note    Gwendolyn Vera  MRN: [id number]  Date: 10/3/2021  LOS: 15     Reason for F/U :   COVID-19 virus pneumonia    Impression :   COVID-19 virus infection tested + 9/17/2021  Acute hypoxic respiratory failure  Elevated inflammatory marker with CRP greater than 200  Responded well to medical treatment  Asymptomatic bacteriuria with E. coli isolated in the urine  Morbid obesity  Diabetes mellitus type 2    Recommendations:   Continue high-dose Decadron and received 20 mg IV daily through 9/26/2021 and now down to 10 mg IV daily for 5 days   The patient received a dose of Actemra 9/22/2021  Clinically the patient is doing much better but she continues to require significant oxygen support. Continue supportive therapy    Infection Control Recommendations:   Droplet plus precautions    Discharge Planning:   Patient will need Midline Catheter Insertion/ PICC line Insertion: No  Patient will need: Home IV , [place],  SNF,  LTAC: Undetermined  Patient willneed outpatient wound care: No    Medical Decision making / Summary of Stay:   Gwendolyn Vera is a 45y.o.-year-old female who was initially admitted on 9/21/2021. John Null is an unvaccinated 67-year-old woman with a history of diabetes mellitus type 2, hypertension, chronic back pain, anemia and morbid obesity among other medical problems. She reports that on Tuesday, September 13, 2021 she started having symptoms with a cough, headache, generalized malaise. She does not report any subjective fevers, chills or sweats but subsequently started to develop some shortness of breath as well as some diarrhea. The diarrhea though she could not quite say if it was related to this illness as she does have irritable bowel syndrome and intermittently does have diarrhea. The patient did subsequently have COVID-19 testing done on September 17, 2021 and was found to be Covid positive.   By Saturday she reports that she was having significant episodes of shortness of breath which prompted her to come into the emergency department for evaluation.     In the ED he was noted to be hypoxic with saturations in the 80s and was started on 6 L of oxygen by nasal cannula and due to worsening hypoxia the patient was placed on high flow O2 by nasal cannula at 40 L and 100% and was also placed on a nonrebreather. The BiPAP was also ordered and the patient was seen by the pulmonology service earlier today. The patient had initially been started on remdesivir and Decadron and the Decadron dose was increased to high-dose Decadron today. The patient was given Actemra 2021    CRP is continued to trend down and the patient has responded well to therapy. Chest x-ray 2021 showed improved airspace opacity      Current evaluation:10/3/2021    /72   Pulse 86   Temp 97.9 °F (36.6 °C) (Oral)   Resp (!) 33   Ht 5' 3\" (1.6 m)   Wt 254 lb 3.1 oz (115.3 kg)   SpO2 91%   BMI 45.03 kg/m²     Temperature Range: Temp: 97.9 °F (36.6 °C) Temp  Av.8 °F (36.6 °C)  Min: 97.7 °F (36.5 °C)  Max: 97.9 °F (36.6 °C)      Afebrile  VS stable    The patient is oxygenating well on high flow NC at 80% FiO2 and 50-->40 L/min    No acute events noted     Actemra administered on 21    RR: 22  SpO2: 96     Labs 10/3/2021  BUN: 26  Creat: 0.75    WBC: 11.9  Hgb: 15.7  Plt: 216    Review of Systems   Constitutional: Negative. Respiratory: Positive for shortness of breath. Cardiovascular: Negative. Gastrointestinal: Negative. Genitourinary: Negative. Musculoskeletal: Negative. Skin: Negative. Neurological: Negative. Psychiatric/Behavioral: Negative. Physical Examination :     Physical Exam  Constitutional:       Appearance: She is well-developed. She is obese. HENT:      Head: Normocephalic and atraumatic. Cardiovascular:      Rate and Rhythm: Regular rhythm. Heart sounds: Normal heart sounds.    Pulmonary: Breath sounds: Rales present. Abdominal:      General: Bowel sounds are normal.      Palpations: Abdomen is soft. Musculoskeletal:      Cervical back: Neck supple. Skin:     General: Skin is warm and dry. Neurological:      Mental Status: She is alert and oriented to person, place, and time. Laboratory data:   I have independently reviewed the followinglabs:  CBC with Differential:   Recent Labs     10/02/21  0547   WBC 11.9*   HGB 15.7*   HCT 45.7      LYMPHOPCT 12*   MONOPCT 4     BMP:   Recent Labs     10/02/21  0547      K 4.1   CL 97*   CO2 28   BUN 26*   CREATININE 0.75     Hepatic Function Panel:   No results for input(s): PROT, LABALBU, BILIDIR, IBILI, BILITOT, ALKPHOS, ALT, AST in the last 72 hours. Lab Results   Component Value Date    PROCAL 0.09 09/24/2021    PROCAL 0.13 09/21/2021     Lab Results   Component Value Date    CRP <3.0 09/30/2021    CRP 47.2 09/24/2021    .3 09/23/2021     No results found for: Lowanda Bright      Lab Results   Component Value Date    DDIMER 0.54 09/21/2021     Lab Results   Component Value Date    FERRITIN 706 09/22/2021    FERRITIN 756 09/21/2021     No results found for: LDH  Lab Results   Component Value Date    FIBRINOGEN 583 09/21/2021       Results in Past 30 Days  Result Component Current Result Ref Range Previous Result Ref Range   SARS-CoV-2, Rapid DETECTED (A) (9/21/2021) Not Detected Not in Time Range      Lab Results   Component Value Date    COVID19 DETECTED 09/21/2021       [facility] in [facility] last 72 hours.     Imaging Studies:   ONE XRAY VIEW OF THE CHEST 9/29/2021 6:25 am  Impression   Limited exam, findings suggest progression in extensive bilateral coarse   alveolar infiltrates suggesting progression in multifocal pneumonia in COVID   positive patient       Cultures:     Culture, Urine [[id number]] (Abnormal) Collected: 09/22/21 1925   Order Status: Completed Specimen: Urine Random Updated: 09/23/21 1436    Specimen Description . Random Urine    Special Requests NOT REPORTED    Culture ESCHERICHIA COLI 50 to 100,000 CFU/MLAbnormal    Escherichia coli (1)    Antibiotic Interpretation DEBBY Status    amikacin  NOT REPORTED Final    ampicillin Sensitive 4 Final    ampicillin-sulbactam  NOT REPORTED Final    aztreonam Sensitive <=1 Final    ceFAZolin Sensitive <=4 Final    ceFAZolin Sensitive Cefazolin sensitivity results can be used to predict the effectiveness of oral cephalosporins (eg. Cephalexin) in uncomplicated Urinary Tract Infections due to E. coli, K. pneumoniae, and P. mirabilis Final    cefepime  NOT REPORTED Final    cefTRIAXone Sensitive <=1 Final    ciprofloxacin Sensitive <=0.25 Final    ertapenem  NOT REPORTED Final    Confirmatory Extended Spectrum Beta-Lactamase Negative NEGATIVE Final    gentamicin Sensitive <=1 Final    meropenem  NOT REPORTED Final    nitrofurantoin Sensitive <=16 Final    tigecycline  NOT REPORTED Final    tobramycin Sensitive <=1 Final    trimethoprim-sulfamethoxazole Sensitive <=20 Final    piperacillin-tazobactam Sensitive <=4 Final        COVID-19, Rapid [[id number]] (Abnormal) Collected: 09/21/21 2123   Order Status: Completed Specimen: Nasopharyngeal Swab Updated: 09/21/21 2154    Specimen Description . NASOPHARYNGEAL SWAB    SARS-CoV-2, Rapid DETECTEDAbnormal     Comment:        Rapid NAAT: The specimen is POSITIVE for SARS-Cov-2, the novel coronavirus associated with   COVID-19.         This test has been authorized by the FDA under an Emergency Use Authorization (EUA) for use   by authorized laboratories.         The ID NOW COVID-19 assay is designed to detect the virus that causes COVID-19 in patients   with signs and symptoms of infection who are suspected of COVID-19. An individual without symptoms of COVID-19 and who is not shedding SARS-CoV-2 virus would   expect to have a negative (not detected) result in this assay.    Fact sheet for Healthcare Providers: Rafaela.vandana   Fact sheet for Patients: BuildHer.vandana           Methodology: Isothermal Nucleic Acid Amplification         Results reported to the appropriate Health Department               Medications:      insulin glargine  40 Units SubCUTAneous BID    dexamethasone  10 mg IntraVENous Daily    furosemide  20 mg IntraVENous Daily    vitamin C  500 mg Oral BID    fluticasone  1 spray Each Nostril Daily    cetirizine  10 mg Oral Daily    insulin lispro  0-18 Units SubCUTAneous TID WC    insulin lispro  0-9 Units SubCUTAneous Nightly    famotidine  20 mg Oral Daily    aspirin  81 mg Oral Daily    ferrous sulfate  325 mg Oral Daily with breakfast    sodium chloride flush  5-40 mL IntraVENous 2 times per day    enoxaparin  30 mg SubCUTAneous BID    Vitamin D  2,000 Units Oral Daily           [facility]  SONJA Wilson CNP  Perfect Serve messaging  OFFICE: [phone]      Electronically signed by SONJA Perkins CNP on 10/3/2021 at 12:16 PM  Thank you for allowing us to participate in the care of this patient. Please call with questions. This note iscreated with the assistance of a speech recognition program.  While intending to generate a document that actually reflects the content of the visit, the document can still have some errors including those of syntax andsound a like substitutions which may escape proof reading. In such instances, actual meaning can be extrapolated by contextual diversion. ATTESTATION:    I have discussed the case, including pertinent history and exam findings with the APRN. I have evaluated the  History, physical findings and pictures of the patient and the key elements of the encounter have been performed by me. I have reviewed the laboratory data, other diagnostic studies and discussed them with the APRN. I have updated the medical record where necessary.     I agree with the assessment, plan and orders as documented by the APRN.     Lamine Huitron MD.

## 2021-10-03 NOTE — PLAN OF CARE
Problem: Airway Clearance - Ineffective  Goal: Achieve or maintain patent airway  10/3/2021 0619 by Dian Saint, RN  Outcome: Ongoing  10/3/2021 0618 by Dian Saint, RN  Outcome: Ongoing     Problem: Gas Exchange - Impaired  Goal: Absence of hypoxia  10/3/2021 0619 by Dian Saint, RN  Outcome: Ongoing  10/3/2021 0618 by Dian Saint, RN  Outcome: Ongoing  Goal: Promote optimal lung function  10/3/2021 0619 by Dian Saint, RN  Outcome: Ongoing  10/3/2021 0618 by Dian Saint, RN  Outcome: Ongoing     Problem: Breathing Pattern - Ineffective  Goal: Ability to achieve and maintain a regular respiratory rate  10/3/2021 0619 by Dian Saint, RN  Outcome: Ongoing  10/3/2021 0618 by Dian Saint, RN  Outcome: Ongoing     Problem:  Body Temperature -  Risk of, Imbalanced  Goal: Ability to maintain a body temperature within defined limits  10/3/2021 0619 by Dian Saint, RN  Outcome: Ongoing  10/3/2021 0618 by Dian Saint, RN  Outcome: Ongoing  Goal: Will regain or maintain usual level of consciousness  10/3/2021 0619 by Dian Saint, RN  Outcome: Ongoing  10/3/2021 0618 by Dian Saint, RN  Outcome: Ongoing  Goal: Complications related to the disease process, condition or treatment will be avoided or minimized  10/3/2021 0619 by Dian Saint, RN  Outcome: Ongoing  10/3/2021 0618 by Dian Saint, RN  Outcome: Ongoing     Problem: Isolation Precautions - Risk of Spread of Infection  Goal: Prevent transmission of infection  10/3/2021 0619 by Dian Saint, RN  Outcome: Ongoing  10/3/2021 0618 by Dian Saint, RN  Outcome: Ongoing     Problem: Nutrition Deficits  Goal: Optimize nutritional status  10/3/2021 0619 by Dian Saint, RN  Outcome: Ongoing  10/3/2021 0618 by Dian Saint, RN  Outcome: Ongoing     Problem: Risk for Fluid Volume Deficit  Goal: Maintain normal heart rhythm  10/3/2021 0619 by Dian Saint, RN  Outcome: Ongoing  10/3/2021 0618 by Tiana Victor LINA Ramirez  Outcome: Ongoing  Goal: Maintain absence of muscle cramping  10/3/2021 0619 by Jesus Dior RN  Outcome: Ongoing  10/3/2021 0618 by Jesus Dior RN  Outcome: Ongoing  Goal: Maintain normal serum potassium, sodium, calcium, phosphorus, and pH  10/3/2021 0619 by Jesus Dior RN  Outcome: Ongoing  10/3/2021 0618 by Jesus Dior RN  Outcome: Ongoing     Problem: Loneliness or Risk for Loneliness  Goal: Demonstrate positive use of time alone when socialization is not possible  10/3/2021 0619 by Jesus Dior RN  Outcome: Ongoing  10/3/2021 0618 by Jesus Dior RN  Outcome: Ongoing     Problem: Fatigue  Goal: Verbalize increase energy and improved vitality  10/3/2021 0619 by Jesus Dior RN  Outcome: Ongoing  10/3/2021 0618 by Jesus Dior RN  Outcome: Ongoing     Problem: Patient Education: Go to Patient Education Activity  Goal: Patient/Family Education  10/3/2021 5450 by Jesus Dior RN  Outcome: Ongoing  10/3/2021 0618 by Jesus Dior RN  Outcome: Ongoing     Problem: Falls - Risk of:  Goal: Will remain free from falls  Description: Will remain free from falls  10/3/2021 0619 by Jesus Dior RN  Outcome: Ongoing  10/3/2021 0618 by Jesus Dior RN  Outcome: Ongoing  Goal: Absence of physical injury  Description: Absence of physical injury  10/3/2021 0619 by Jesus Dior RN  Outcome: Ongoing  10/3/2021 0618 by Jesus Dior RN  Outcome: Ongoing     Problem: Nutrition  Goal: Optimal nutrition therapy  10/3/2021 0619 by Jesus Dior RN  Outcome: Ongoing  10/3/2021 0618 by Jesus Dior RN  Outcome: Ongoing     Problem: OXYGENATION/RESPIRATORY FUNCTION  Goal: Patient will achieve/maintain normal respiratory rate/effort  Description: Respiratory rate and effort will be within normal limits for the patient  10/3/2021 2872 by Jesus Dior RN  Outcome: Ongoing  10/3/2021 0618 by Jesus Dior RN  Outcome: Ongoing  10/2/2021 2137 by Walker Irving

## 2021-10-03 NOTE — PROGRESS NOTES
[facility]  Office: [address], DO, Meryparis Hiro, DO, Suzieziyad Diana, DO, Carmina Lopez, DO, Leticia Tran MD, Michael Martines MD, Reagn Mills MD, Jeremy Vance MD, Rosalba Kirkland MD, Marycarmen Harrington MD, Sadie Velasquez MD, Avelina Vela, DO, Red Gao, DO, Mikaela Garrido MD,  Kristin Carney, DO, Lee Rojas MD, Willard Sullivan MD, Froilan Reddy MD, Jazz Zuniga MD, , Levar Hernandez MD, Steven Hartmann MD, Alva Carmen MD, Bernardino Carey [facility], [facility], CNP, Jerry Florentino, CNP, David Rivas, CNS, Ting Benavidez, CNP, Daniela Salcido, CNP, Ese Mendoza, CNP, Linda Jones, [facility], Baron Fontenot, CNP, Trina Mohan PA-C, Daron Encinas, [facility], Stephanie Dial, CNP, Roxana Daniels, CNP, Christopher Fleming, CNP, Viktoriya Aleman, CNP, Natalie Salas CNP, Meggan Gonsales, CNP, Cooper Benedict CNP, [facility], [address] [place] [address]    Progress Note    10/3/2021    3:43 PM    Name:   Gwendolyn Vera  MRN:     [id number]     Acct:      [de-identified]   Room:   [address] [place] [address] Day:  12  Admit Date:  9/21/2021  8:43 PM    PCP:   Larissa Escobar  Code Status:  Full Code    Subjective:     C/C:   Chief Complaint   Patient presents with   Blase Liming Other     COVID+    Shortness of Breath     Interval History Status: Improved    Pt seen and examined this morning. No acute events overnight. Clinically unchanged. Patient remains on high flow nasal cannula. Down to 65% FiO2 on Bipap, 80% on HFNC. Ear fullness/pain is now intermittent since starting the flonase. Glucoses are up and down. Brief History:     Gwendolyn Vera is a 45 y.o. Non- / non  female who presents with Other (COVID+) and Shortness of Breath   and is admitted to the hospital for the management of Covid 19 Pneumonia      Patient presented to the emergency room for the concern of worsening COVID-19 Symptoms.   Patient was originally seen Patient was originally diagnosed with the COVID 4 days prior. Patient states since diagnosis she has been having increased shortness of breath, cough  With water-like diarrhea. Upon arrival to the emergency room patient was noted to be hypoxic with saturations in the 80's  she required 6 lpm nc supplemental oxygen. Was seen by infectious disease, started on high-dose steroids, Actemra was given on 9/22/2021. Patient however continue to require more oxygen. She has been feeling fine for the past couple days but still requires 90% FiO2 to maintain oxygen of 91 to 90%. She still feels very winded with ambulation.      Did not receive the vaccine because she felt it was \"rushed. \"    - Uptrendning oxygen requirements prompting transfer to [facility] ICU overnight on 9/27-9/28.    - Slowly weaning. Likely LTAC. Review of Systems:     Constitutional:  negative for chills, fevers, sweats, + fatigue  HEENT: reports intermittent left ear fullness. Respiratory:  +cough, +dyspnea on exertion, slight improvement of shortness of breath,  No wheezing  Cardiovascular:  negative for chest pain, chest pressure/discomfort, lower extremity edema, palpitations  Gastrointestinal:  negative for abdominal pain, constipation, diarrhea, nausea, vomiting  Neurological:  negative for dizziness, headache    Medications: Allergies:     Allergies   Allergen Reactions    Cephalexin      Gets yeast infection    Codeine        Current Meds:   Scheduled Meds:    insulin glargine  40 Units SubCUTAneous BID    dexamethasone  10 mg IntraVENous Daily    furosemide  20 mg IntraVENous Daily    vitamin C  500 mg Oral BID    fluticasone  1 spray Each Nostril Daily    cetirizine  10 mg Oral Daily    insulin lispro  0-18 Units SubCUTAneous TID WC    insulin lispro  0-9 Units SubCUTAneous Nightly    famotidine  20 mg Oral Daily    aspirin  81 mg Oral Daily    ferrous sulfate  325 mg Oral Daily with breakfast    sodium chloride flush  5-40 mL IntraVENous 2 times per day    enoxaparin  30 mg SubCUTAneous BID    Vitamin D  2,000 Units Oral Daily     Continuous Infusions:    dextrose      sodium chloride 25 mL (21 1822)     PRN Meds: albuterol sulfate HFA **AND** ipratropium, dextromethorphan-guaiFENesin, glucose, glucagon (rDNA), dextrose, ALPRAZolam, potassium chloride **OR** potassium alternative oral replacement **OR** potassium chloride, glucose, dextrose, glucagon (rDNA), magnesium sulfate, sodium phosphate IVPB **OR** sodium phosphate IVPB **OR** sodium phosphate IVPB, sodium chloride flush, sodium chloride, ondansetron **OR** ondansetron, magnesium hydroxide, acetaminophen **OR** acetaminophen    Data:     Past Medical History:   has a past medical history of Anemia, Chronic back pain, [facility] H/O LEEP, Hypertension, Obesity, morbid, BMI 40.0-49.9 ([facility] and S/P endometrial ablation. Social History:   reports that she has never smoked. She has never used smokeless tobacco. She reports current alcohol use. She reports that she does not use drugs. Family History:   Family History   Problem Relation Age of Onset    Hypotension Mother     Heart Disease Father     Heart Failure Father        Vitals:  /72   Pulse 86   Temp 97.9 °F (36.6 °C) (Oral)   Resp (!) 33   Ht 5' 3\" (1.6 m)   Wt 254 lb 3.1 oz (115.3 kg)   SpO2 91%   BMI 45.03 kg/m²   Temp (24hrs), Av.8 °F (36.6 °C), Min:97.7 °F (36.5 °C), Max:97.9 °F (36.6 °C)    Recent Labs     10/02/21  1644 10/03/21  0005 10/03/21  0720 10/03/21  1130   POCGLU 347* 264* 101 229*       I/O (24Hr):   No intake or output data in the 24 hours ending 10/03/21 1543    Labs:  Hematology:  Recent Labs     10/02/21  0547   WBC 11.9*   RBC 5.35*   HGB 15.7*   HCT 45.7   MCV 85.4   MCH 29.3   MCHC 34.4   RDW 13.3      MPV 12.0     Chemistry:  Recent Labs     10/02/21  0547      K 4.1   CL 97*   CO2 28   GLUCOSE 194*   BUN 26*   CREATININE 0.75   ANIONGAP 13 following  - Continue combivent scheduled  - IS/acapella  - Continue Lasix 20 mg daily     Sepsis from covid: Secondary to #1    Left ear fullness - continue flonase    Essential hypertension: Currently stable off of meds. Diabetes mellitus type 2:   - Continue Lantus to 50 units BID with ISS mealtime coverage. Glucoses are currently controlled. - Decrease dose to 40 units BID starting tonight    Morbid obesity (BMI 45.69): Weight loss encouraged    GI/DVT prophylaxis: Pepcid, Lovenox twice daily    Labs & imaging personally reviewed - CXR with diffuse bilateral hazy infiltrates  Continue slow oxygen wean. Likely LTAC later this week.     33 Kay Bella DO  10/3/2021  3:43 PM

## 2021-10-03 NOTE — PROGRESS NOTES
PULMONARY & CRITICAL CARE MEDICINE PROGRESS NOTE     Patient:  Rom Barone  MRN: [id number]  Admit date: 9/21/2021  Primary Care Physician: Melissa Tucker  Consulting Physician: Eros Dinero DO  CODE Status: Full Code  LOS: 11     SUBJECTIVE     CHIEF COMPLAINT/REASON FOR INITIAL CONSULT:   Acute respiratory failure/COVID-19 pneumonia    BRIEF HOSPITAL COURSE:   The patient is a 45 y.o. female history of diabetes mellitus, hypertension, morbid obesity  unvaccinated was admitted with shortness of breath and diagnosed with COVID-19 pneumonia. She apparently was diagnosed about 4 days ago. There was associated cough that is mostly dry and also having some diarrhea. Patient required increasing oxygen with initial oxygen saturation being in the 80s. No previous history of lung disease  Patient is a non-smoker    INTERVAL HISTORY:  10/02/21  Pt was seen and examined at bedside. Overnight events noted, chart seen labs seen and medications reviewed. Afebrile overnight, hemodynamically stable. Saturating above 90% on high flow nasal cannula 40 L on 80% FiO2. Wean down to 65% overnight. \  urine output 1.1 L last 24 hours  Remains on Decadron 10 mg daily    REVIEW OF SYSTEMS:  Review of Systems   Constitutional: Positive for fatigue. Negative for appetite change and fever. HENT: Negative for postnasal drip, rhinorrhea, sore throat, trouble swallowing and voice change. Eyes: Negative for redness and visual disturbance. Respiratory: Positive for cough and shortness of breath. Negative for wheezing. Cardiovascular: Negative for chest pain, palpitations and leg swelling. Gastrointestinal: Negative for abdominal pain, constipation, diarrhea, nausea and vomiting. Endocrine: Negative for polyuria. Genitourinary: Negative for dysuria, frequency, hematuria and urgency. Musculoskeletal: Negative. Allergic/Immunologic: Negative.     Neurological: Negative for dizziness, syncope, speech difficulty and headaches. Hematological: Negative for adenopathy. Does not bruise/bleed easily. Psychiatric/Behavioral: Negative. pulm  OBJECTIVE     PaO2/FiO2 RATIO:  No results for input(s): POCPO2 in the last 72 hours. FiO2 : 80 %     VITAL SIGNS:   LAST:  /81   Pulse 88   Temp 97.5 °F (36.4 °C) (Axillary)   Resp 26   Ht 5' 3\" (1.6 m)   Wt 254 lb 3.1 oz (115.3 kg)   SpO2 95%   BMI 45.03 kg/m²   8-24 HR RANGE:  TEMP Temp  Av.8 °F (36.6 °C)  Min: 97.3 °F (36.3 °C)  Max: 98.1 °F (28.6 °C)   BP Systolic (56AYC), XP , Min:99 , ADR:894      Diastolic (67RST), QSJ:07, Min:63, Max:81     PULSE Pulse  Av.9  Min: 64  Max: 103   RR Resp  Av.2  Min: 23  Max: 30   O2 SAT SpO2  Av.8 %  Min: 93 %  Max: 97 %   OXYGEN DELIVERY O2 Flow Rate (L/min)  Av.5 L/min  Min: 50 L/min  Max: 60 L/min        SYSTEMIC EXAMINATION:   General appearance - Ill-appearing, mild to moderate respiratory distress  Mental status - awake & alert, follows commands  Eyes - pupils equal and reactive, sclera anicteric  Mouth - mucous membranes moist, pharynx normal without lesions. Large tongue Mallampati 2  Neck -Short thick neck supple, no significant adenopathy, carotids upstroke normal bilaterally, no bruits  Chest - There is no intercostal recession or use of accessory muscles. Breath sounds bilaterally were dimnished to auscultation at bases. There were mild crackles present at bases. No expiratory wheezing and no rhonchi  Heart - normal rate, regular rhythm, normal S1, S2, no murmurs, rubs, clicks or gallops  Abdomen - soft, nontender, nondistended, no masses or organomegaly  Neurological - non-focal  Extremities - peripheral pulses normal, mild bilateral pedal edema, no clubbing or cyanosis.   No calf tenderness  Skin - normal coloration and turgor, no rashes, no suspicious skin lesions noted     DATA REVIEW     Medications: Current Inpatient  Scheduled Meds:   dexamethasone  10 mg IntraVENous Daily    furosemide  20 mg IntraVENous Daily    insulin glargine  50 Units SubCUTAneous BID    vitamin C  500 mg Oral BID    fluticasone  1 spray Each Nostril Daily    cetirizine  10 mg Oral Daily    insulin lispro  0-18 Units SubCUTAneous TID     insulin lispro  0-9 Units SubCUTAneous Nightly    famotidine  20 mg Oral Daily    aspirin  81 mg Oral Daily    ferrous sulfate  325 mg Oral Daily with breakfast    sodium chloride flush  5-40 mL IntraVENous 2 times per day    enoxaparin  30 mg SubCUTAneous BID    Vitamin D  2,000 Units Oral Daily     Continuous Infusions:   dextrose      sodium chloride 25 mL (09/22/21 1822)       INPUT/OUTPUT:  In: 640 [P.O.:640]  Out: 1900 [Urine:1900]  Date 10/02/21 0000 - 10/02/21 2359   Shift 9286-4638 4531-3910 6597-4075 24 Hour Total   INTAKE   Shift Total(mL/kg)       OUTPUT   Urine(mL/kg/hr)  1100  1100   Shift Total(mL/kg)  1100(9.5)  1100(9.5)   Weight (kg) 115.3 115.3 115.3 115.3        LABS:  ABGs:   No results for input(s): POCPH, POCPCO2, POCPO2, POCHCO3, IICC7VKC in the last 72 hours. CBC:   Recent Labs     09/30/21  0507 10/02/21  0547   WBC 10.8 11.9*   HGB 15.2* 15.7*   HCT 45.1 45.7   MCV 87.4 85.4    216   LYMPHOPCT 10* 12*   RBC 5.16* 5.35*   MCH 29.5 29.3   MCHC 33.7 34.4   RDW 13.2 13.3     CRP:   Recent Labs     09/30/21  0507   CRP <3.0     LDH:   No results for input(s): LDH in the last 72 hours. BMP:   Recent Labs     09/30/21  0507 10/02/21  0547    138   K 4.0 4.1    97*   CO2 24 28   BUN 30* 26*   CREATININE 0.56 0.75   GLUCOSE 194* 194*     Liver Function Test:   No results for input(s): PROT, LABALBU, ALT, AST, GGT, ALKPHOS, BILITOT in the last 72 hours. Coagulation Profile:   No results for input(s): INR, PROTIME, APTT in the last 72 hours. D-Dimer:  No results for input(s): DDIMER in the last 72 hours. Ferritin:    No results for input(s): FERRITIN in the last 72 hours.   Lactic Acid:  No results for input(s): LACTA saturating above 90% on 80% and 40 L.   -No BiPAP overnight as she is able to tolerate HFNC  -She is getting Lasix 20 mg IV daily urine output is good and will continue.  -Monitor urine output renal function and electrolytes  - Daily CRP: 9/21 - 205, 128, 47, <3  - Tested positive:  9/17/21  - Symptom onset:  9/16/21  - Out of Isolation: No  - Vaccinated: No  - Remdesivir: 9/22  - Decadron:  Restarted 9/2 -10 mg daily  - Actemra: 9/22/21  - Co-infection: No. Asymptomatic bacteruria E Coli in urine.  - Fluid Balance: euvolemic  - Glycemic Control: Per primary team.     Discussed with respiratory therapist treatment plan discussed  Discussed with nursing staff. Total critical care time caring for this patient with life threatening, unstable organ failure, including direct patient contact, management of life support systems, review of data including imaging and labs, discussions with other team members and physicians at least 27  Min so far today, excluding procedures. Julissa Saldaña MD  Internal Medicine Resident, PGY-3  [address], [place]  10/3/2021, 9:48 AM      This patient was evaluated in the context of the global SARS-CoV-2 (COVID-19) pandemic, which necessitated considerations that the patient either has COVID-19 infection or is at risk of infection with COVID-19. Institutional protocols and algorithms that pertain to the evaluation & management of patients with COVID-19 or those at risk for COVID-19 are in a state of rapid changes based on information released by regulatory bodies including the CDC and federal and state organizations. These policies and algorithms were followed during the patient's care. Please note that this chart was generated using voice recognition Dragon dictation software. Although every effort was made to ensure the accuracy of this automated transcription, some errors in transcription may have occurred.          Attending Physician Statement  I have PM

## 2021-10-03 NOTE — PLAN OF CARE
Problem: Airway Clearance - Ineffective  Goal: Achieve or maintain patent airway  10/3/2021 1020 by Herlinda Painting RN  Outcome: Ongoing  10/3/2021 0619 by Polina Bradley RN  Outcome: Ongoing  10/3/2021 0618 by Polina Bradley RN  Outcome: Ongoing     Problem: Gas Exchange - Impaired  Goal: Absence of hypoxia  10/3/2021 1020 by Herlinda Painting RN  Outcome: Ongoing  10/3/2021 0619 by Polina Bradley RN  Outcome: Ongoing  10/3/2021 0618 by Polina Bradley RN  Outcome: Ongoing  Goal: Promote optimal lung function  10/3/2021 1020 by Herlinda Painting RN  Outcome: Ongoing  10/3/2021 0619 by Polina Bradley RN  Outcome: Ongoing  10/3/2021 0618 by Polina Bradley RN  Outcome: Ongoing     Problem: Breathing Pattern - Ineffective  Goal: Ability to achieve and maintain a regular respiratory rate  10/3/2021 1020 by Herlinda Painting RN  Outcome: Ongoing  10/3/2021 0619 by Polina Bradley RN  Outcome: Ongoing  10/3/2021 0618 by Polina Bradley RN  Outcome: Ongoing     Problem: Risk for Fluid Volume Deficit  Goal: Maintain normal heart rhythm  10/3/2021 1020 by Herlinda Painting RN  Outcome: Ongoing  10/3/2021 0619 by Polina Bradley RN  Outcome: Ongoing  10/3/2021 0618 by Polina Bradley RN  Outcome: Ongoing  Goal: Maintain absence of muscle cramping  10/3/2021 1020 by Herlinda Painting RN  Outcome: Ongoing  10/3/2021 0619 by Polina Bradley RN  Outcome: Ongoing  10/3/2021 0618 by Polina Bradley RN  Outcome: Ongoing  Goal: Maintain normal serum potassium, sodium, calcium, phosphorus, and pH  10/3/2021 1020 by Herlinda Painting RN  Outcome: Ongoing  10/3/2021 0619 by Polina Bradley RN  Outcome: Ongoing  10/3/2021 0618 by Polina Bradley RN  Outcome: Ongoing     Problem: Falls - Risk of:  Goal: Will remain free from falls  Description: Will remain free from falls  10/3/2021 1020 by Herlinda Painting RN  Outcome: Ongoing  10/3/2021 0619 by Polina Bradley RN  Outcome: Ongoing  10/3/2021 0618 by Polina Bradley, RN  Outcome: Ongoing  Goal: Absence of physical injury  Description: Absence of physical injury  10/3/2021 1020 by Rekha Laura RN  Outcome: Ongoing  10/3/2021 0619 by Stephon Avendano RN  Outcome: Ongoing  10/3/2021 0618 by Stephon Avendano RN  Outcome: Ongoing     Problem: Nutrition  Goal: Optimal nutrition therapy  10/3/2021 1020 by Rekha Laura RN  Outcome: Ongoing  10/3/2021 0619 by Stephon Avendano RN  Outcome: Ongoing  10/3/2021 0618 by Stephon Avendano RN  Outcome: Ongoing

## 2021-10-03 NOTE — PLAN OF CARE
Problem: Airway Clearance - Ineffective  Goal: Achieve or maintain patent airway  Outcome: Ongoing     Problem: Gas Exchange - Impaired  Goal: Absence of hypoxia  Outcome: Ongoing  Goal: Promote optimal lung function  Outcome: Ongoing     Problem: Breathing Pattern - Ineffective  Goal: Ability to achieve and maintain a regular respiratory rate  Outcome: Ongoing     Problem:  Body Temperature -  Risk of, Imbalanced  Goal: Ability to maintain a body temperature within defined limits  Outcome: Ongoing  Goal: Will regain or maintain usual level of consciousness  Outcome: Ongoing  Goal: Complications related to the disease process, condition or treatment will be avoided or minimized  Outcome: Ongoing     Problem: Isolation Precautions - Risk of Spread of Infection  Goal: Prevent transmission of infection  Outcome: Ongoing     Problem: Nutrition Deficits  Goal: Optimize nutritional status  Outcome: Ongoing     Problem: Risk for Fluid Volume Deficit  Goal: Maintain normal heart rhythm  Outcome: Ongoing  Goal: Maintain absence of muscle cramping  Outcome: Ongoing  Goal: Maintain normal serum potassium, sodium, calcium, phosphorus, and pH  Outcome: Ongoing     Problem: Loneliness or Risk for Loneliness  Goal: Demonstrate positive use of time alone when socialization is not possible  Outcome: Ongoing     Problem: Fatigue  Goal: Verbalize increase energy and improved vitality  Outcome: Ongoing     Problem: Patient Education: Go to Patient Education Activity  Goal: Patient/Family Education  Outcome: Ongoing     Problem: Falls - Risk of:  Goal: Will remain free from falls  Description: Will remain free from falls  Outcome: Ongoing  Goal: Absence of physical injury  Description: Absence of physical injury  Outcome: Ongoing     Problem: Nutrition  Goal: Optimal nutrition therapy  Outcome: Ongoing     Problem: OXYGENATION/RESPIRATORY FUNCTION  Goal: Patient will achieve/maintain normal respiratory rate/effort  Description: Respiratory rate and effort will be within normal limits for the patient  10/3/2021 6701 by Barbara Davis RN  Outcome: Ongoing  10/2/2021 2137 by Allan Davenport RCP  Outcome: Ongoing     Problem: SKIN INTEGRITY  Goal: Skin integrity is maintained or improved  Outcome: Ongoing

## 2021-10-04 NOTE — PROGRESS NOTES
PULMONARY & CRITICAL CARE MEDICINE PROGRESS NOTE     Patient:  Gin Raymundo  MRN: [id number]  Admit date: 9/21/2021  Primary Care Physician: Irena Magallon  Consulting Physician: Fer Harper DO  CODE Status: Full Code  LOS: 11     SUBJECTIVE     CHIEF COMPLAINT/REASON FOR INITIAL CONSULT:   Acute respiratory failure/COVID-19 pneumonia    BRIEF HOSPITAL COURSE:   The patient is a 45 y.o. female history of diabetes mellitus, hypertension, morbid obesity  unvaccinated was admitted with shortness of breath and diagnosed with COVID-19 pneumonia. She apparently was diagnosed about 4 days ago. There was associated cough that is mostly dry and also having some diarrhea. Patient required increasing oxygen with initial oxygen saturation being in the 80s. No previous history of lung disease  Patient is a non-smoker    INTERVAL HISTORY:  10/02/21  Pt was seen and examined at bedside. Overnight events noted, chart seen labs seen and medications reviewed. Afebrile overnight, hemodynamically stable. Saturating above 90% on high flow nasal cannula 40 L  Down to 55% FiO2.    urine output 700 mL last 24 hours  Remains on Decadron 10 mg daily    REVIEW OF SYSTEMS:  Review of Systems   Constitutional: Positive for fatigue. Negative for appetite change and fever. HENT: Negative for postnasal drip, rhinorrhea, sore throat, trouble swallowing and voice change. Eyes: Negative for redness and visual disturbance. Respiratory: Positive for cough and shortness of breath. Negative for wheezing. Cardiovascular: Negative for chest pain, palpitations and leg swelling. Gastrointestinal: Negative for abdominal pain, constipation, diarrhea, nausea and vomiting. Endocrine: Negative for polyuria. Genitourinary: Negative for dysuria, frequency, hematuria and urgency. Musculoskeletal: Negative. Allergic/Immunologic: Negative. Neurological: Negative for dizziness, syncope, speech difficulty and headaches. Hematological: Negative for adenopathy. Does not bruise/bleed easily. Psychiatric/Behavioral: Negative. pulm  OBJECTIVE     PaO2/FiO2 RATIO:  No results for input(s): POCPO2 in the last 72 hours. FiO2 : 80 %     VITAL SIGNS:   LAST:  /81   Pulse 88   Temp 97.5 °F (36.4 °C) (Axillary)   Resp 26   Ht 5' 3\" (1.6 m)   Wt 254 lb 3.1 oz (115.3 kg)   SpO2 95%   BMI 45.03 kg/m²   8-24 HR RANGE:  TEMP Temp  Av.8 °F (36.6 °C)  Min: 97.3 °F (36.3 °C)  Max: 98.1 °F (96.9 °C)   BP Systolic (23CXN), FR , Min:99 , EWW:232      Diastolic (24UKQ), EK, Min:63, Max:81     PULSE Pulse  Av.9  Min: 64  Max: 103   RR Resp  Av.2  Min: 23  Max: 30   O2 SAT SpO2  Av.8 %  Min: 93 %  Max: 97 %   OXYGEN DELIVERY O2 Flow Rate (L/min)  Av.5 L/min  Min: 50 L/min  Max: 60 L/min        SYSTEMIC EXAMINATION:   General appearance - Ill-appearing, mild to moderate respiratory distress  Mental status - awake & alert, follows commands  Eyes - pupils equal and reactive, sclera anicteric  Mouth - mucous membranes moist, pharynx normal without lesions. Large tongue Mallampati 2  Neck -Short thick neck supple, no significant adenopathy, carotids upstroke normal bilaterally, no bruits  Chest - There is no intercostal recession or use of accessory muscles. Breath sounds bilaterally were dimnished to auscultation at bases. There were mild crackles present at bases. No expiratory wheezing and no rhonchi  Heart - normal rate, regular rhythm, normal S1, S2, no murmurs, rubs, clicks or gallops  Abdomen - soft, nontender, nondistended, no masses or organomegaly  Neurological - non-focal  Extremities - peripheral pulses normal, mild bilateral pedal edema, no clubbing or cyanosis.   No calf tenderness  Skin - normal coloration and turgor, no rashes, no suspicious skin lesions noted     DATA REVIEW     Medications: Current Inpatient  Scheduled Meds:   dexamethasone  10 mg IntraVENous Daily    furosemide  20 mg IntraVENous Daily    insulin glargine  50 Units SubCUTAneous BID    vitamin C  500 mg Oral BID    fluticasone  1 spray Each Nostril Daily    cetirizine  10 mg Oral Daily    insulin lispro  0-18 Units SubCUTAneous TID     insulin lispro  0-9 Units SubCUTAneous Nightly    famotidine  20 mg Oral Daily    aspirin  81 mg Oral Daily    ferrous sulfate  325 mg Oral Daily with breakfast    sodium chloride flush  5-40 mL IntraVENous 2 times per day    enoxaparin  30 mg SubCUTAneous BID    Vitamin D  2,000 Units Oral Daily     Continuous Infusions:   dextrose      sodium chloride 25 mL (09/22/21 1822)       INPUT/OUTPUT:  In: 640 [P.O.:640]  Out: 1900 [Urine:1900]  Date 10/02/21 0000 - 10/02/21 2359   Shift 3335-7543 9461-6035 7064-4282 24 Hour Total   INTAKE   Shift Total(mL/kg)       OUTPUT   Urine(mL/kg/hr)  1100  1100   Shift Total(mL/kg)  1100(9.5)  1100(9.5)   Weight (kg) 115.3 115.3 115.3 115.3        LABS:  ABGs:   No results for input(s): POCPH, POCPCO2, POCPO2, POCHCO3, JGUR3WRG in the last 72 hours. CBC:   Recent Labs     09/30/21  0507 10/02/21  0547   WBC 10.8 11.9*   HGB 15.2* 15.7*   HCT 45.1 45.7   MCV 87.4 85.4    216   LYMPHOPCT 10* 12*   RBC 5.16* 5.35*   MCH 29.5 29.3   MCHC 33.7 34.4   RDW 13.2 13.3     CRP:   Recent Labs     09/30/21  0507   CRP <3.0     LDH:   No results for input(s): LDH in the last 72 hours. BMP:   Recent Labs     09/30/21  0507 10/02/21  0547    138   K 4.0 4.1    97*   CO2 24 28   BUN 30* 26*   CREATININE 0.56 0.75   GLUCOSE 194* 194*     Liver Function Test:   No results for input(s): PROT, LABALBU, ALT, AST, GGT, ALKPHOS, BILITOT in the last 72 hours. Coagulation Profile:   No results for input(s): INR, PROTIME, APTT in the last 72 hours. D-Dimer:  No results for input(s): DDIMER in the last 72 hours. Ferritin:    No results for input(s): FERRITIN in the last 72 hours.   Lactic Acid:  No results for input(s): LACTA in the last 72 hours. Cardiac Enzymes:  No results for input(s): CKTOTAL, CKMB, CKMBINDEX, TROPONINI in the last 72 hours. Invalid input(s): TROPONIN, HSTROP  BNP/ProBNP:   No results for input(s): BNP, PROBNP in the last 72 hours. Triglycerides:  No results for input(s): TRIG in the last 72 hours. Microbiology:  Urine Culture:  No components found for: CURINE  Blood Culture:  No components found for: CBLOOD, CFUNGUSBL  Sputum Culture:  No components found for: CSPUTUM  No results for input(s): SPECDESC, SPECIAL, CULTURE, STATUS, ORG, CDIFFTOXPCR, CAMPYLOBPCR, SALMONELLAPC, SHIGAPCR, SHIGELLAPCR, MPNEUG, MPNEUM, LACTOQL in the last 72 hours. No results for input(s): SPUTUM, SPECDESC, SPECIAL, CULTURE, STATUS, ORG, CDIFFTOXPCR, MPNEUM, MPNEUG in the last 72 hours. Invalid input(s): CURINE, CBLOOD, CFUNGUSBL     Pathology:    Radiology Reports:  XR CHEST PORTABLE   Final Result   Limited exam, findings suggest progression in extensive bilateral coarse   alveolar infiltrates suggesting progression in multifocal pneumonia in COVID   positive patient         XR CHEST (SINGLE VIEW FRONTAL)   Final Result   Bilateral diffuse pulmonary edema and or pneumonia without definite effusion. No cardiomegaly was identified. Slight improvement bilaterally has occurred from 09/21/2021. XR CHEST PORTABLE   Final Result   Multifocal airspace opacities worrisome multifocal atypical viral pneumonia. Low lung volumes              Echocardiogram:   No results found for this or any previous visit.        ASSESSMENT AND PLAN     Assessment:    // Acute hypoxic respiratory failure  // Acute respiratory distress syndrome  // Bilateral multifocal pneumonia due to COVID 19 infection  // Diabetes mellitus.  // Morbid obesity  // Likely obstructive sleep apnea/hypoventilation  // Hypertension  // E. coli in urine    Plan:    -Currently on high flow nasal cannula continued need of high flow nasal cannula and BiPAP saturating above 90% down to 55% and 40 L. Wean as possible  -No BiPAP overnight as she is able to tolerate HFNC  -lasix DC'd  -Monitor urine output renal function and electrolytes  - Daily CRP: 9/21 - 205, 128, 47, <3  - Tested positive:  9/17/21  - Symptom onset:  9/16/21  - Out of Isolation: No  - Vaccinated: No  - Remdesivir: 9/22  - Decadron:  Restarted 9/2 -10 mg daily  - Actemra: 9/22/21  - Co-infection: No. Asymptomatic bacteruria E Coli in urine.  - Fluid Balance: euvolemic  - Glycemic Control: Per primary team.     Discussed with respiratory therapist treatment plan discussed  Discussed with nursing staff. Total critical care time caring for this patient with life threatening, unstable organ failure, including direct patient contact, management of life support systems, review of data including imaging and labs, discussions with other team members and physicians at least 27  Min so far today, excluding procedures. Corrinne Macadamia, MD  Internal Medicine Resident, PGY-3  [place], [place]  10/4/2021, 1:25 PM      This patient was evaluated in the context of the global SARS-CoV-2 (COVID-19) pandemic, which necessitated considerations that the patient either has COVID-19 infection or is at risk of infection with COVID-19. Institutional protocols and algorithms that pertain to the evaluation & management of patients with COVID-19 or those at risk for COVID-19 are in a state of rapid changes based on information released by regulatory bodies including the CDC and federal and state organizations. These policies and algorithms were followed during the patient's care. Please note that this chart was generated using voice recognition Dragon dictation software. Although every effort was made to ensure the accuracy of this automated transcription, some errors in transcription may have occurred.      Attending Physician Statement  I have discussed the care of Mary Rowland, including pertinent

## 2021-10-04 NOTE — PLAN OF CARE
Problem: Airway Clearance - Ineffective  Goal: Achieve or maintain patent airway  10/4/2021 1035 by Angela Damico RN  Outcome: Ongoing  10/3/2021 2303 by Man Colon  Outcome: Met This Shift     Problem: Gas Exchange - Impaired  Goal: Absence of hypoxia  10/4/2021 1035 by Angela Damico RN  Outcome: Ongoing  10/3/2021 2303 by Man Colon  Outcome: Met This Shift  Goal: Promote optimal lung function  10/4/2021 1035 by Angela Damico RN  Outcome: Ongoing  10/3/2021 2303 by Man Colon  Outcome: Met This Shift     Problem: Breathing Pattern - Ineffective  Goal: Ability to achieve and maintain a regular respiratory rate  10/4/2021 1035 by Angela Damico RN  Outcome: Ongoing  10/3/2021 2303 by Man Colon  Outcome: Met This Shift     Problem:  Body Temperature -  Risk of, Imbalanced  Goal: Ability to maintain a body temperature within defined limits  10/4/2021 1035 by Angela Damico RN  Outcome: Ongoing  10/3/2021 2303 by Man Colon  Outcome: Met This Shift  Goal: Will regain or maintain usual level of consciousness  10/4/2021 1035 by Angela Damico RN  Outcome: Ongoing  10/3/2021 2303 by Man Colon  Outcome: Met This Shift  Goal: Complications related to the disease process, condition or treatment will be avoided or minimized  10/4/2021 1035 by Angela Damico RN  Outcome: Ongoing  10/3/2021 2303 by Man Colon  Outcome: Met This Shift     Problem: Isolation Precautions - Risk of Spread of Infection  Goal: Prevent transmission of infection  10/4/2021 1035 by Angela Damico RN  Outcome: Ongoing  10/3/2021 2303 by Man Colon  Outcome: Met This Shift     Problem: Nutrition Deficits  Goal: Optimize nutritional status  10/4/2021 1035 by Angela Damico RN  Outcome: Ongoing  10/3/2021 2303 by Man Colon  Outcome: Met This Shift     Problem: Risk for Fluid Volume Deficit  Goal: Maintain normal heart rhythm  10/4/2021 1035 by Angela Damico RN  Outcome: Ongoing  10/3/2021 2303 by Loistine Fothergill  Outcome: Met This Shift  Goal: Maintain absence of muscle cramping  10/4/2021 1035 by Flor Peralta RN  Outcome: Ongoing  10/3/2021 2303 by Loistine Fothergill  Outcome: Met This Shift  Goal: Maintain normal serum potassium, sodium, calcium, phosphorus, and pH  10/4/2021 1035 by Flor Peralta RN  Outcome: Ongoing  10/3/2021 2303 by Loistine Fothergill  Outcome: Met This Shift     Problem: Loneliness or Risk for Loneliness  Goal: Demonstrate positive use of time alone when socialization is not possible  10/4/2021 1035 by Flor Peralta RN  Outcome: Ongoing  10/3/2021 2303 by Loistine Fothergill  Outcome: Met This Shift     Problem: Fatigue  Goal: Verbalize increase energy and improved vitality  10/4/2021 1035 by Flor Peralta RN  Outcome: Ongoing  10/3/2021 2303 by Loistine Fothergill  Outcome: Met This Shift     Problem: Patient Education: Go to Patient Education Activity  Goal: Patient/Family Education  Outcome: Ongoing     Problem: Falls - Risk of:  Goal: Will remain free from falls  Description: Will remain free from falls  Outcome: Ongoing  Goal: Absence of physical injury  Description: Absence of physical injury  Outcome: Ongoing     Problem: Nutrition  Goal: Optimal nutrition therapy  Outcome: Ongoing     Problem: OXYGENATION/RESPIRATORY FUNCTION  Goal: Patient will achieve/maintain normal respiratory rate/effort  Description: Respiratory rate and effort will be within normal limits for the patient  Outcome: Ongoing     Problem: SKIN INTEGRITY  Goal: Skin integrity is maintained or improved  Outcome: Ongoing

## 2021-10-04 NOTE — PROGRESS NOTES
[facility]  Office: [address], DO, Mel Escotoland, DO, John Alex, DO, Ana Luisa Amarillo Blood, DO, Jonas Barragan MD, Adore Miner MD, Emiliano Brewer MD, Kameron Corbett MD, Marshall Klein MD, Eduard Elena MD, Gilberto Orantes MD, Charles Moore, DO, Axel France, DO, Feliciano Womack MD,  Saintclair Lank, DO, Tami Leon MD, Sidra Guerrero MD, Mali Monterroso MD, Rajesh Lr MD, , Ana Maria Thornton MD, Evelyn Rosas MD, Godwin Ivey MD, Daryle Springer, Sarai Gao, CNP, Josue Gallo, CNP, Cy Guzmán, CNS, Shantel Tinoco, CNP, Xavi Muse, CNP, Awais Lizama, CNP, Dayron Todd, CNP, Mateo Loja, CNP, SHILPA MichelC, Kashmir Bang, [facility], Odalys Horta, CNP, Shane Pride, CNP, Jose G Zaldivar, CNP, Fatuma Arce, CNP, Clem Huffman, CNP, Isatu Pritchard, CNP, Migue Atkins, CNP, Victory Lalit, [address] [place] [address]    Progress Note    10/4/2021    4:06 PM    Name:   Karlie Maya  MRN:     [id number]     Acct:      [de-identified]   Room:   [address] [place] [address] Day:  15  Admit Date:  9/21/2021  8:43 PM    PCP:   Marla Lopez  Code Status:  Full Code    Subjective:     C/C:   Chief Complaint   Patient presents with   Bernestine Power Other     COVID+    Shortness of Breath     Interval History Status: Improved    Pt seen and examined this morning. No acute events overnight. She is having worsening oxygen requirements. Currently on 55% FiO2 on BiPAP therapy. She is quite tachypneic, breathing upwards of 40 breaths/min. She continues to feel well, but the breathing is definitely worsening today. 100% FiO2 required 1 high flow nasal cannula. Discussed possible need for intubation during this hospitalization. Brief History:     Karlie Maya is a 45 y.o.  Non- / non  female who presents with Other (COVID+) and Shortness of Breath   and is admitted to the hospital for the management of Covid 19 Pneumonia      Patient presented to the emergency room for the concern of worsening COVID-19 Symptoms. Patient was originally seen Patient was originally diagnosed with the COVID 4 days prior. Patient states since diagnosis she has been having increased shortness of breath, cough  With water-like diarrhea. Upon arrival to the emergency room patient was noted to be hypoxic with saturations in the 80's  she required 6 lpm nc supplemental oxygen. Was seen by infectious disease, started on high-dose steroids, Actemra was given on 9/22/2021. Patient however continue to require more oxygen. She has been feeling fine for the past couple days but still requires 90% FiO2 to maintain oxygen of 91 to 90%. She still feels very winded with ambulation.      Did not receive the vaccine because she felt it was \"rushed. \"    - Uptrendning oxygen requirements prompting transfer to [facility] ICU overnight on 9/27-9/28.    - Slowly weaning. Likely LTAC. - Worsening FiO2 on 10/3-10/4. Review of Systems:     Constitutional:  negative for chills, fevers, sweats, + fatigue  HEENT: reports intermittent left ear fullness. Respiratory:  +cough, +dyspnea on exertion, + shortness of breath,  No wheezing  Cardiovascular:  negative for chest pain, chest pressure/discomfort, lower extremity edema, palpitations  Gastrointestinal:  negative for abdominal pain, constipation, diarrhea, nausea, vomiting  Neurological:  negative for dizziness, headache  Psych: Reports anxiety    Medications: Allergies:     Allergies   Allergen Reactions    Cephalexin      Gets yeast infection    Codeine        Current Meds:   Scheduled Meds:    furosemide        insulin glargine  40 Units SubCUTAneous BID    dexamethasone  10 mg IntraVENous Daily    vitamin C  500 mg Oral BID    fluticasone  1 spray Each Nostril Daily    cetirizine  10 mg Oral Daily    insulin lispro  0-18 Units SubCUTAneous TID     insulin lispro  0-9 Units SubCUTAneous Nightly  famotidine  20 mg Oral Daily    aspirin  81 mg Oral Daily    ferrous sulfate  325 mg Oral Daily with breakfast    sodium chloride flush  5-40 mL IntraVENous 2 times per day    enoxaparin  30 mg SubCUTAneous BID    Vitamin D  2,000 Units Oral Daily     Continuous Infusions:    dextrose      sodium chloride 25 mL (21)     PRN Meds: albuterol sulfate HFA **AND** ipratropium, dextromethorphan-guaiFENesin, glucose, glucagon (rDNA), dextrose, ALPRAZolam, potassium chloride **OR** potassium alternative oral replacement **OR** potassium chloride, glucose, dextrose, glucagon (rDNA), magnesium sulfate, sodium phosphate IVPB **OR** sodium phosphate IVPB **OR** sodium phosphate IVPB, sodium chloride flush, sodium chloride, ondansetron **OR** ondansetron, magnesium hydroxide, acetaminophen **OR** acetaminophen    Data:     Past Medical History:   has a past medical history of Anemia, Chronic back pain, [facility] H/O LEEP, Hypertension, Obesity, morbid, BMI 40.0-49.9 ([facility] and S/P endometrial ablation. Social History:   reports that she has never smoked. She has never used smokeless tobacco. She reports current alcohol use. She reports that she does not use drugs. Family History:   Family History   Problem Relation Age of Onset    Hypotension Mother     Heart Disease Father     Heart Failure Father        Vitals:  /72   Pulse 70   Temp 98.1 °F (36.7 °C) (Oral)   Resp 24   Ht 5' 3\" (1.6 m)   Wt 250 lb 7.1 oz (113.6 kg)   SpO2 95%   BMI 44.36 kg/m²   Temp (24hrs), Av.9 °F (36.6 °C), Min:97.7 °F (36.5 °C), Max:98.1 °F (36.7 °C)    Recent Labs     10/03/21  1605 10/03/21  1951 10/04/21  0624 10/04/21  1110   POCGLU 247* 249* 78 248*       I/O (24Hr):     Intake/Output Summary (Last 24 hours) at 10/4/2021 1606  Last data filed at 10/4/2021 1400  Gross per 24 hour   Intake 500 ml   Output 1900 ml   Net -1400 ml       Labs:  Hematology:  Recent Labs     10/02/21  0547 10/04/21  0555   WBC 11.9* 10.6   RBC 5.35* 5.20*   HGB 15.7* 15.7*   HCT 45.7 43.9   MCV 85.4 84.4   MCH 29.3 30.2   MCHC 34.4 35.8*   RDW 13.3 13.2    169   MPV 12.0 12.5     Chemistry:  Recent Labs     10/02/21  0547 10/04/21  0555    137   K 4.1 3.6*   CL 97* 96*   CO2 28 30   GLUCOSE 194* 92   BUN 26* 27*   CREATININE 0.75 0.67   ANIONGAP 13 11   LABGLOM >60 >60   GFRAA >60 >60   CALCIUM 8.8 8.7     Recent Labs     10/03/21  0720 10/03/21  1130 10/03/21  1605 10/03/21  1951 10/04/21  0555 10/04/21  0624 10/04/21  1110   PROT  --   --   --   --  5.5*  --   --    LABALBU  --   --   --   --  3.4*  --   --    AST  --   --   --   --  24  --   --    ALT  --   --   --   --  37*  --   --    ALKPHOS  --   --   --   --  84  --   --    BILITOT  --   --   --   --  0.91  --   --    BILIDIR  --   --   --   --  0.20  --   --    POCGLU 101 229* 247* 249*  --  78 248*         Radiology:  XR CHEST PORTABLE    Result Date: 9/21/2021  Multifocal airspace opacities worrisome multifocal atypical viral pneumonia. Low lung volumes       Physical Examination:        General appearance:  alert, cooperative, ill-appearing, obese  female sitting up in bed. Mental Status:  oriented to person, place and time and normal affect  HEENT: BIPAP present, EOMI, sclera are anti-icteric  Lungs:  Tachypneic, diminished posteriorly, low inspiratory effort, deep breathing induces a couh  Heart:  regular rate and rhythm, no murmur  Abdomen: protuberant, soft, nontender, nondistended, normal bowel sounds, no masses, hepatomegaly, splenomegaly  Extremities:  no edema, redness, tenderness in the calves  Skin:  no gross lesions, rashes, induration    Assessment:        Hospital Problems         Last Modified POA    * (Principal) Pneumonia due to COVID-19 virus 9/28/2021 Yes    COVID-19 9/30/2021 Yes    Acute respiratory failure with hypoxia ([facility] 9/28/2021 Yes    Essential hypertension 9/28/2021 Yes    Elevated C-reactive protein (CRP) 9/28/2021 Yes    Type 2 diabetes mellitus with hyperglycemia, with long-term current use of insulin ([facility] 9/28/2021 Yes    Obesity, morbid, BMI 40.0-49.9 ([facility] 9/28/2021 Yes          Plan:        COVID-19 viral pneumonia:   - Infectious disease following   - Oxygen requirements are worsening; alternate high flow nasal cannula and BiPAP therapy. - Received 10 days of Decadron 20 mg/10 mg. Currently on day #8 of 10 mg Decadron therapy. - Actemra received 9/22  - Vitamin C, vitamin D  - DVT ppx - Lovenox    Acute hypoxic respiratory failure:   - On high flow nasal cannula. Weaned down to 100% HFNC / 55% Bipap  - Worsening today  - Pulmonology following  - Continue combivent scheduled  - IS/acapella  - Continue Lasix 20 mg daily  - Discussed that she is worsening, may need intubation during this hospitalization. Patient understands how sick she is.  - Check D-dimer today. - Give extra dose of Lasix today. Increase to 40 mg    Sepsis from covid: Secondary to #1    Left ear fullness - continue flonase    Essential hypertension: Currently stable off of meds. Diabetes mellitus type 2:   - Continue Lantus to 50 units BID with ISS mealtime coverage. Glucoses are currently controlled. - Decrease dose to 40 units BID starting tonight    Morbid obesity (BMI 45.69): Weight loss encouraged    GI/DVT prophylaxis: Pepcid, Lovenox twice daily    Worsening oxygen requirements. Checking D-dimer. Rule out DVT/PE. May need intubation.     [address] Kay Bella DO  10/4/2021  4:06 PM

## 2021-10-04 NOTE — PROGRESS NOTES
Pt. Cont. To be tachypneic 35,  On Bipap at 55%. O2 sat 95%. Pt. Given one time dose 40 IV lasix. Will cont. To monitor. Pure wick placed for comfort and accurate output.

## 2021-10-04 NOTE — PLAN OF CARE
Problem: Airway Clearance - Ineffective  Goal: Achieve or maintain patent airway  10/3/2021 2303 by Lolita Casper  Outcome: Met This Shift  10/3/2021 1020 by Rekha Laura RN  Outcome: Ongoing     Problem: Gas Exchange - Impaired  Goal: Absence of hypoxia  10/3/2021 2303 by Lolita Casper  Outcome: Met This Shift  10/3/2021 1020 by Rekha Laura RN  Outcome: Ongoing  Goal: Promote optimal lung function  10/3/2021 2303 by Lolita Casper  Outcome: Met This Shift  10/3/2021 1020 by Rekha Laura RN  Outcome: Ongoing     Problem: Breathing Pattern - Ineffective  Goal: Ability to achieve and maintain a regular respiratory rate  10/3/2021 2303 by Lolita Casper  Outcome: Met This Shift  10/3/2021 1020 by Rekha Laura RN  Outcome: Ongoing     Problem:  Body Temperature -  Risk of, Imbalanced  Goal: Ability to maintain a body temperature within defined limits  10/3/2021 2303 by Lolita Casper  Outcome: Met This Shift  10/3/2021 1020 by Rekha Laura RN  Outcome: Ongoing  Goal: Will regain or maintain usual level of consciousness  10/3/2021 2303 by Lolita Casper  Outcome: Met This Shift  10/3/2021 1020 by Rekha Laura RN  Outcome: Ongoing  Goal: Complications related to the disease process, condition or treatment will be avoided or minimized  10/3/2021 2303 by Lolita Nanceine  Outcome: Met This Shift  10/3/2021 1020 by Rekha Laura RN  Outcome: Ongoing     Problem: Isolation Precautions - Risk of Spread of Infection  Goal: Prevent transmission of infection  10/3/2021 2303 by Lolita Casper  Outcome: Met This Shift  10/3/2021 1020 by Rekha Laura RN  Outcome: Ongoing     Problem: Nutrition Deficits  Goal: Optimize nutritional status  10/3/2021 2303 by Lolita Casper  Outcome: Met This Shift  10/3/2021 1020 by Rekha Laura RN  Outcome: Ongoing     Problem: Risk for Fluid Volume Deficit  Goal: Maintain normal heart rhythm  10/3/2021 2303 by Lolita Casper  Outcome: Met This Shift  10/3/2021 1020 by Rekha Laura RN  Outcome: Ongoing  Goal: Maintain absence of muscle cramping  10/3/2021 2303 by Brian Samuels  Outcome: Met This Shift  10/3/2021 1020 by Spencer Miller RN  Outcome: Ongoing  Goal: Maintain normal serum potassium, sodium, calcium, phosphorus, and pH  10/3/2021 2303 by Brian Samuels  Outcome: Met This Shift  10/3/2021 1020 by Spencer Miller RN  Outcome: Ongoing     Problem: Loneliness or Risk for Loneliness  Goal: Demonstrate positive use of time alone when socialization is not possible  10/3/2021 2303 by Brian Samuels  Outcome: Met This Shift  10/3/2021 1020 by Spencer Miller RN  Outcome: Ongoing     Problem: Fatigue  Goal: Verbalize increase energy and improved vitality  10/3/2021 2303 by Brian Samuels  Outcome: Met This Shift  10/3/2021 1020 by Spencer Miller RN  Outcome: Ongoing     Problem: Patient Education: Go to Patient Education Activity  Goal: Patient/Family Education  10/3/2021 1020 by Spencer Miller RN  Outcome: Ongoing     Problem: Falls - Risk of:  Goal: Will remain free from falls  Description: Will remain free from falls  10/3/2021 1020 by Spencer Miller RN  Outcome: Ongoing  Goal: Absence of physical injury  Description: Absence of physical injury  10/3/2021 1020 by Spencer Miller RN  Outcome: Ongoing     Problem: Nutrition  Goal: Optimal nutrition therapy  10/3/2021 1020 by Spencer Miller RN  Outcome: Ongoing     Problem: OXYGENATION/RESPIRATORY FUNCTION  Goal: Patient will achieve/maintain normal respiratory rate/effort  Description: Respiratory rate and effort will be within normal limits for the patient  10/3/2021 1020 by Spencer Miller RN  Outcome: Ongoing     Problem: SKIN INTEGRITY  Goal: Skin integrity is maintained or improved  10/3/2021 1020 by Spencer Miller RN  Outcome: Ongoing

## 2021-10-04 NOTE — PROGRESS NOTES
[facility]  Progress Note    Kell Denton  MRN: [id number]  Date: 10/4/2021  LOS: 15     Reason for F/U :   COVID-19 virus pneumonia    Impression :   COVID-19 virus infection tested + 9/17/2021  Acute hypoxic respiratory failure  Elevated inflammatory marker with CRP greater than 200  Responded well to medical treatment  Asymptomatic bacteriuria with E. coli isolated in the urine  Morbid obesity  Diabetes mellitus type 2    Recommendations:   Continue high-dose Decadron and received 20 mg IV daily through 9/26/2021 and now down to 10 mg IV daily extended through 10/7/2021  The patient received a dose of Actemra 9/22/2021  The patient continues to require high flow O2 now at 45 L and 80%. This is an improvement overall. Clinically the patient is improved and we will continue supportive therapy and weaning as tolerated  The patient may require to go to a long-term acute care facility if not able to wean off the high flow  Continue isolation through October 12, 2021 [21 days]    Infection Control Recommendations:   Droplet plus precautions    Discharge Planning:   Patient will need Midline Catheter Insertion/ PICC line Insertion: No  Patient will need: Home IV , [place],  SNF,  LTAC: Undetermined  Patient willneed outpatient wound care: No    Medical Decision making / Summary of Stay:   Kell Denton is a 45y.o.-year-old female who was initially admitted on 9/21/2021. Geovanna Crowell is an unvaccinated 36-year-old woman with a history of diabetes mellitus type 2, hypertension, chronic back pain, anemia and morbid obesity among other medical problems. She reports that on Tuesday, September 13, 2021 she started having symptoms with a cough, headache, generalized malaise. She does not report any subjective fevers, chills or sweats but subsequently started to develop some shortness of breath as well as some diarrhea.   The diarrhea though she could not quite say if it was related to this illness as she does have irritable bowel syndrome and intermittently does have diarrhea. The patient did subsequently have COVID-19 testing done on 2021 and was found to be Covid positive. By Saturday she reports that she was having significant episodes of shortness of breath which prompted her to come into the emergency department for evaluation.     In the ED he was noted to be hypoxic with saturations in the 80s and was started on 6 L of oxygen by nasal cannula and due to worsening hypoxia the patient was placed on high flow O2 by nasal cannula at 40 L and 100% and was also placed on a nonrebreather. The BiPAP was also ordered and the patient was seen by the pulmonology service earlier today. The patient had initially been started on remdesivir and Decadron and the Decadron dose was increased to high-dose Decadron today. The patient was given Actemra 2021    CRP is continued to trend down and the patient has responded well to therapy. Chest x-ray 2021 showed improved airspace opacity      Current evaluation:10/4/2021    /72   Pulse 70   Temp 98.1 °F (36.7 °C) (Oral)   Resp 24   Ht 5' 3\" (1.6 m)   Wt 250 lb 7.1 oz (113.6 kg)   SpO2 95%   BMI 44.36 kg/m²     Temperature Range: Temp: 98.1 °F (36.7 °C) Temp  Av.9 °F (36.6 °C)  Min: 97.7 °F (36.5 °C)  Max: 98.1 °F (36.7 °C)  The patient is seen and evaluated at bedside she is awake and alert in no acute distress. She is doing well clinically still does have some shortness of breath with exertion. She is on high flow at 45 L and 80%. She uses 50% BiPAP overnight. Review of Systems   Constitutional: Negative. Respiratory: Positive for shortness of breath. Cardiovascular: Negative. Gastrointestinal: Negative. Genitourinary: Negative. Musculoskeletal: Negative. Skin: Negative. Neurological: Negative. Psychiatric/Behavioral: Negative.         Physical Examination :     Physical Exam  Constitutional:       Appearance: She is well-developed. She is obese. HENT:      Head: Normocephalic and atraumatic. Cardiovascular:      Rate and Rhythm: Regular rhythm. Heart sounds: Normal heart sounds. Pulmonary:      Breath sounds: Rales present. Abdominal:      General: Bowel sounds are normal.      Palpations: Abdomen is soft. Musculoskeletal:      Cervical back: Neck supple. Skin:     General: Skin is warm and dry. Neurological:      Mental Status: She is alert and oriented to person, place, and time. Laboratory data:   I have independently reviewed the followinglabs:  CBC with Differential:   Recent Labs     10/02/21  0547 10/04/21  0555   WBC 11.9* 10.6   HGB 15.7* 15.7*   HCT 45.7 43.9    169   LYMPHOPCT 12* 17*   MONOPCT 4 5     BMP:   Recent Labs     10/02/21  0547 10/04/21  0555    137   K 4.1 3.6*   CL 97* 96*   CO2 28 30   BUN 26* 27*   CREATININE 0.75 0.67     Hepatic Function Panel:   Recent Labs     10/04/21  0555   PROT 5.5*   LABALBU 3.4*   BILIDIR 0.20   IBILI 0.71   BILITOT 0.91   ALKPHOS 84   ALT 37*   AST 24         Lab Results   Component Value Date    PROCAL 0.09 09/24/2021    PROCAL 0.13 09/21/2021     Lab Results   Component Value Date    CRP <3.0 09/30/2021    CRP 47.2 09/24/2021    .3 09/23/2021     No results found for: SEDRATE      Lab Results   Component Value Date    DDIMER 0.54 09/21/2021     Lab Results   Component Value Date    FERRITIN 706 09/22/2021    FERRITIN 756 09/21/2021     No results found for: LDH  Lab Results   Component Value Date    FIBRINOGEN 583 09/21/2021       Results in Past 30 Days  Result Component Current Result Ref Range Previous Result Ref Range   SARS-CoV-2, Rapid DETECTED (A) (9/21/2021) Not Detected Not in Time Range      Lab Results   Component Value Date    COVID19 DETECTED 09/21/2021       No results for input(s): [facility] in the last 72 hours.     Imaging Studies:   ONE XRAY VIEW OF THE CHEST 9/29/2021 6:25 am  Impression   Limited exam, findings suggest progression in extensive bilateral coarse   alveolar infiltrates suggesting progression in multifocal pneumonia in COVID   positive patient       Cultures:     Culture, Urine [[id number]] (Abnormal) Collected: 09/22/21 1925   Order Status: Completed Specimen: Urine Random Updated: 09/23/21 1436    Specimen Description . Random Urine    Special Requests NOT REPORTED    Culture ESCHERICHIA COLI 50 to 100,000 CFU/MLAbnormal    Escherichia coli (1)    Antibiotic Interpretation DEBBY Status    amikacin  NOT REPORTED Final    ampicillin Sensitive 4 Final    ampicillin-sulbactam  NOT REPORTED Final    aztreonam Sensitive <=1 Final    ceFAZolin Sensitive <=4 Final    ceFAZolin Sensitive Cefazolin sensitivity results can be used to predict the effectiveness of oral cephalosporins (eg. Cephalexin) in uncomplicated Urinary Tract Infections due to E. coli, K. pneumoniae, and P. mirabilis Final    cefepime  NOT REPORTED Final    cefTRIAXone Sensitive <=1 Final    ciprofloxacin Sensitive <=0.25 Final    ertapenem  NOT REPORTED Final    Confirmatory Extended Spectrum Beta-Lactamase Negative NEGATIVE Final    gentamicin Sensitive <=1 Final    meropenem  NOT REPORTED Final    nitrofurantoin Sensitive <=16 Final    tigecycline  NOT REPORTED Final    tobramycin Sensitive <=1 Final    trimethoprim-sulfamethoxazole Sensitive <=20 Final    piperacillin-tazobactam Sensitive <=4 Final        COVID-19, Rapid [[id number]] (Abnormal) Collected: 09/21/21 2123   Order Status: Completed Specimen: Nasopharyngeal Swab Updated: 09/21/21 2154    Specimen Description . NASOPHARYNGEAL SWAB    SARS-CoV-2, Rapid DETECTEDAbnormal     Comment:        Rapid NAAT: The specimen is POSITIVE for SARS-Cov-2, the novel coronavirus associated with   COVID-19.         This test has been authorized by the FDA under an Emergency Use Authorization (EUA) for use   by authorized laboratories.         The ID NOW COVID-19 assay is designed to detect the virus that causes COVID-19 in patients   with signs and symptoms of infection who are suspected of COVID-19. An individual without symptoms of COVID-19 and who is not shedding SARS-CoV-2 virus would   expect to have a negative (not detected) result in this assay. Fact sheet for Healthcare Providers: Ping   Fact sheet for Patients: Ping           Methodology: Isothermal Nucleic Acid Amplification         Results reported to the appropriate Health Department               Medications:      furosemide        insulin glargine  40 Units SubCUTAneous BID    dexamethasone  10 mg IntraVENous Daily    vitamin C  500 mg Oral BID    fluticasone  1 spray Each Nostril Daily    cetirizine  10 mg Oral Daily    insulin lispro  0-18 Units SubCUTAneous TID WC    insulin lispro  0-9 Units SubCUTAneous Nightly    famotidine  20 mg Oral Daily    aspirin  81 mg Oral Daily    ferrous sulfate  325 mg Oral Daily with breakfast    sodium chloride flush  5-40 mL IntraVENous 2 times per day    enoxaparin  30 mg SubCUTAneous BID    Vitamin D  2,000 Units Oral Daily           [facility]  Eron Irving MD  [employer] messaging  OFFICE: [phone]      Electronically signed by Eron Irving MD on 10/4/2021 at 4:44 PM  Thank you for allowing us to participate in the care of this patient. Please call with questions. This note iscreated with the assistance of a speech recognition program.  While intending to generate a document that actually reflects the content of the visit, the document can still have some errors including those of syntax andsound a like substitutions which may escape proof reading. In such instances, actual meaning can be extrapolated by contextual diversion.

## 2021-10-04 NOTE — PROGRESS NOTES
Nutrition Assessment     Type and Reason for Visit: Reassess    Nutrition Recommendations/Plan:   -Continue 5 CHO diabetic diet   -Suggest d/t glucerna supplements BID per pt request     Nutrition Assessment:  Pt remains in droplet plus precaution room r/t COVID-19. Writer called pt to interview. Pt stated that her appetite has been very good this week consuming % of her meals. No wounds noted. Pt stated UBW of 260-270 lbs. Informed pt that she is currently 250 lbs - pt is very happy with her current wt/wt loss and hopes to lose more weight. Pt states that she dislikes the glucerna supplements and wishes to not receive any at this time. Pt deemed to be low-risk. Full nutrition assessment not needed at this time. Will monitor for changes in nutritional status. Malnutrition Assessment:  Malnutrition Status: No malnutrition    Current Nutrition Therapies:    ADULT DIET; Regular; 5 carb choices (75 gm/meal);  Safety Tray; Safety Tray (Disposables)  Adult Oral Nutrition Supplement; Diabetic Oral Supplement     Nutrition Diagnosis:   No nutrition diagnosis at this time    Nutrition Interventions:   Food and/or Nutrient Delivery:  Continue Current Diet, Discontinue Oral Nutrition Supplement  Nutrition Education/Counseling:  Education not indicated   Coordination of Nutrition Care:  Continue to monitor while inpatient    Goals: Achieved   Pt to meet % of est'd needs via PO daily       Nutrition Monitoring and Evaluation:   Food/Nutrient Intake Outcomes:  Food and Nutrient Intake  Physical Signs/Symptoms Outcomes:  Biochemical Data, Nutrition Focused Physical Findings, Skin, Weight, GI Status, Fluid Status or Edema     Discharge Planning:    Continue current diet     Electronically signed by Carmina Esposito RD, LD on 10/4/21 at 3:06 PM EDT    Contact: [phone]

## 2021-10-05 NOTE — PROGRESS NOTES
PULMONARY & CRITICAL CARE MEDICINE PROGRESS NOTE     Patient:  Tawana Pitt  MRN: [id number]  Admit date: 9/21/2021  Primary Care Physician: Jenell Aschoff  Consulting Physician: Jessica Melendez DO  CODE Status: Full Code  LOS: 11     SUBJECTIVE     CHIEF COMPLAINT/REASON FOR INITIAL CONSULT:   Acute respiratory failure/COVID-19 pneumonia    BRIEF HOSPITAL COURSE:   The patient is a 45 y.o. female history of diabetes mellitus, hypertension, morbid obesity  unvaccinated was admitted with shortness of breath and diagnosed with COVID-19 pneumonia. She apparently was diagnosed about 4 days ago. There was associated cough that is mostly dry and also having some diarrhea. Patient required increasing oxygen with initial oxygen saturation being in the 80s. No previous history of lung disease  Patient is a non-smoker    INTERVAL HISTORY:  10/02/21  Pt was seen and examined at bedside. Overnight events noted, chart seen labs seen and medications reviewed. Afebrile overnight. Hemodynamically stable. Currently on BIPAP 40L 65% FiO2 saturating >90%  Intermittent HFNC  Labs reviewed  UO 2200 mL/24hr  Remains on Decadron 10 mg daily    REVIEW OF SYSTEMS:  Review of Systems   Constitutional: Positive for fatigue. Negative for appetite change and fever. HENT: Negative for postnasal drip, rhinorrhea, sore throat, trouble swallowing and voice change. Eyes: Negative for redness and visual disturbance. Respiratory: Positive for cough and shortness of breath. Negative for wheezing. Cardiovascular: Negative for chest pain, palpitations and leg swelling. Gastrointestinal: Negative for abdominal pain, constipation, diarrhea, nausea and vomiting. Endocrine: Negative for polyuria. Genitourinary: Negative for dysuria, frequency, hematuria and urgency. Musculoskeletal: Negative. Allergic/Immunologic: Negative. Neurological: Negative for dizziness, syncope, speech difficulty and headaches. Hematological: Negative for adenopathy. Does not bruise/bleed easily. Psychiatric/Behavioral: Negative. pulm  OBJECTIVE     PaO2/FiO2 RATIO:  No results for input(s): POCPO2 in the last 72 hours. FiO2 : 80 %     VITAL SIGNS:   LAST:  /81   Pulse 88   Temp 97.5 °F (36.4 °C) (Axillary)   Resp 26   Ht 5' 3\" (1.6 m)   Wt 254 lb 3.1 oz (115.3 kg)   SpO2 95%   BMI 45.03 kg/m²   8-24 HR RANGE:  TEMP Temp  Av.8 °F (36.6 °C)  Min: 97.3 °F (36.3 °C)  Max: 98.1 °F (88.3 °C)   BP Systolic (65FTY), DQU:812 , Min:99 , LXC:452      Diastolic (58OPP), XXT:25, Min:63, Max:81     PULSE Pulse  Av.9  Min: 64  Max: 103   RR Resp  Av.2  Min: 23  Max: 30   O2 SAT SpO2  Av.8 %  Min: 93 %  Max: 97 %   OXYGEN DELIVERY O2 Flow Rate (L/min)  Av.5 L/min  Min: 50 L/min  Max: 60 L/min        SYSTEMIC EXAMINATION:   General appearance - Ill-appearing, mild to moderate respiratory distress  Mental status - awake & alert, follows commands  Eyes - pupils equal and reactive, sclera anicteric  Mouth - mucous membranes moist, pharynx normal without lesions. Large tongue Mallampati 2  Neck -Short thick neck supple, no significant adenopathy, carotids upstroke normal bilaterally, no bruits  Chest - There is no intercostal recession or use of accessory muscles. Breath sounds bilaterally were dimnished to auscultation at bases. There were mild crackles present at bases. No expiratory wheezing and no rhonchi  Heart - normal rate, regular rhythm, normal S1, S2, no murmurs, rubs, clicks or gallops  Abdomen - soft, nontender, nondistended, no masses or organomegaly  Neurological - non-focal  Extremities - peripheral pulses normal, mild bilateral pedal edema, no clubbing or cyanosis.   No calf tenderness  Skin - normal coloration and turgor, no rashes, no suspicious skin lesions noted     DATA REVIEW     Medications: Current Inpatient  Scheduled Meds:   dexamethasone  10 mg IntraVENous Daily    furosemide  20 mg IntraVENous Daily    insulin glargine  50 Units SubCUTAneous BID    vitamin C  500 mg Oral BID    fluticasone  1 spray Each Nostril Daily    cetirizine  10 mg Oral Daily    insulin lispro  0-18 Units SubCUTAneous TID     insulin lispro  0-9 Units SubCUTAneous Nightly    famotidine  20 mg Oral Daily    aspirin  81 mg Oral Daily    ferrous sulfate  325 mg Oral Daily with breakfast    sodium chloride flush  5-40 mL IntraVENous 2 times per day    enoxaparin  30 mg SubCUTAneous BID    Vitamin D  2,000 Units Oral Daily     Continuous Infusions:   dextrose      sodium chloride 25 mL (09/22/21 1822)       INPUT/OUTPUT:  In: 640 [P.O.:640]  Out: 1900 [Urine:1900]  Date 10/02/21 0000 - 10/02/21 2359   Shift 4948-4503 1663-4705 4186-3751 24 Hour Total   INTAKE   Shift Total(mL/kg)       OUTPUT   Urine(mL/kg/hr)  1100  1100   Shift Total(mL/kg)  1100(9.5)  1100(9.5)   Weight (kg) 115.3 115.3 115.3 115.3        LABS:  ABGs:   No results for input(s): POCPH, POCPCO2, POCPO2, POCHCO3, [id number] in the last 72 hours. CBC:   Recent Labs     09/30/21  0507 10/02/21  0547   WBC 10.8 11.9*   HGB 15.2* 15.7*   HCT 45.1 45.7   MCV 87.4 85.4    216   LYMPHOPCT 10* 12*   RBC 5.16* 5.35*   MCH 29.5 29.3   MCHC 33.7 34.4   RDW 13.2 13.3     CRP:   Recent Labs     09/30/21  0507   CRP <3.0     LDH:   No results for input(s): LDH in the last 72 hours. BMP:   Recent Labs     09/30/21  0507 10/02/21  0547    138   K 4.0 4.1    97*   CO2 24 28   BUN 30* 26*   CREATININE 0.56 0.75   GLUCOSE 194* 194*     Liver Function Test:   No results for input(s): PROT, LABALBU, ALT, AST, GGT, ALKPHOS, BILITOT in the last 72 hours. Coagulation Profile:   No results for input(s): INR, PROTIME, APTT in the last 72 hours. D-Dimer:  No results for input(s): DDIMER in the last 72 hours. Ferritin:    No results for input(s): FERRITIN in the last 72 hours.   Lactic Acid:  No results for input(s): LACTA in the last 72 hours. Cardiac Enzymes:  No results for input(s): CKTOTAL, CKMB, CKMBINDEX, TROPONINI in the last 72 hours. Invalid input(s): TROPONIN, HSTROP  BNP/ProBNP:   No results for input(s): BNP, PROBNP in the last 72 hours. Triglycerides:  No results for input(s): TRIG in the last 72 hours. Microbiology:  Urine Culture:  No components found for: CURINE  Blood Culture:  No components found for: CBLOOD, CFUNGUSBL  Sputum Culture:  No components found for: CSPUTUM  No results for input(s): SPECDESC, SPECIAL, CULTURE, STATUS, ORG, CDIFFTOXPCR, CAMPYLOBPCR, SALMONELLAPC, SHIGAPCR, SHIGELLAPCR, MPNEUG, MPNEUM, LACTOQL in the last 72 hours. No results for input(s): SPUTUM, SPECDESC, SPECIAL, CULTURE, STATUS, ORG, CDIFFTOXPCR, MPNEUM, MPNEUG in the last 72 hours. Invalid input(s): CURINE, CBLOOD, CFUNGUSBL     Pathology:    Radiology Reports:  XR CHEST PORTABLE   Final Result   Limited exam, findings suggest progression in extensive bilateral coarse   alveolar infiltrates suggesting progression in multifocal pneumonia in COVID   positive patient         XR CHEST (SINGLE VIEW FRONTAL)   Final Result   Bilateral diffuse pulmonary edema and or pneumonia without definite effusion. No cardiomegaly was identified. Slight improvement bilaterally has occurred from 09/21/2021. XR CHEST PORTABLE   Final Result   Multifocal airspace opacities worrisome multifocal atypical viral pneumonia. Low lung volumes              Echocardiogram:   No results found for this or any previous visit.        ASSESSMENT AND PLAN     Assessment:    // Acute hypoxic respiratory failure  // Acute respiratory distress syndrome  // Bilateral multifocal pneumonia due to COVID 19 infection  // Diabetes mellitus.  // Morbid obesity  // Likely obstructive sleep apnea/hypoventilation  // Hypertension  // E. coli in urine      Plan:  - Currently on BIPAP 40L 65% FiO2 saturating >90%  - Intermittent HFNC  - continue decadron 10mg  - lasix DC'd. Good UO 2.2L  -Monitor urine output renal function and electrolytes  - Daily CRP: 9/21 - 205, 128, 47, <3  - Tested positive:  9/17/21  - Symptom onset:  9/16/21  - Out of Isolation: No  - Vaccinated: No  - Remdesivir: 9/22  - Decadron:  Restarted 9/2 -10 mg daily  - Actemra: 9/22/21  - Co-infection: No. Asymptomatic bacteruria E Coli in urine.  - Fluid Balance: euvolemic  - Glycemic Control: Per primary team.     Discussed with respiratory therapist treatment plan discussed  Discussed with nursing staff. Total critical care time caring for this patient with life threatening, unstable organ failure, including direct patient contact, management of life support systems, review of data including imaging and labs, discussions with other team members and physicians at least 27  Min so far today, excluding procedures. Bubba Silva MD  Internal Medicine Resident, PGY-3  [facility]; [place], [place]  10/5/2021, 11:52 AM      This patient was evaluated in the context of the global SARS-CoV-2 (COVID-19) pandemic, which necessitated considerations that the patient either has COVID-19 infection or is at risk of infection with COVID-19. Institutional protocols and algorithms that pertain to the evaluation & management of patients with COVID-19 or those at risk for COVID-19 are in a state of rapid changes based on information released by regulatory bodies including the CDC and federal and state organizations. These policies and algorithms were followed during the patient's care. Please note that this chart was generated using voice recognition Dragon dictation software. Although every effort was made to ensure the accuracy of this automated transcription, some errors in transcription may have occurred. Attending Physician Statement  I have discussed the care of Mikael Menendez, including pertinent history and exam findings with the resident.  I have reviewed the key elements of all parts of the encounter with the resident. I have seen and examined the patient with the resident. I agree with the assessment and plan and status of the problem list as documented. I seen the patient during my round left with the events, chart reviewed, labs and medications reviewed overnight events noted. She is on high flow nasal cannula remains on high flow nasal cannula yesterday during the afternoon she had coughing spells as she was short of breath and she was tachypneic require BiPAP at that time. Overnight patient did use BiPAP 20/10. She is on high flow at 85% and 40 L she is saturating above 90% she does desaturate sometimes when she ambulate she does complain of shortness of breath on ambulation on bed and out of bed. She does not complain of chest pain denies any increased cough does have sputum production denies fever and chills and denies diarrhea. Continue with high flow nasal cannula BiPAP as needed and for short intervals alternating with high flow nasal cannula during the daytime she need to be on BiPAP every night. I would recommend to give Lasix daily 20 mg patient had received Lasix 20 mg daily for last 3 to 5 days and she had extra doses of Lasix yesterday and day before yesterday. Suggest to follow-up electrolytes and BUN/creatinine intake and output while she is on Lasix. Discussed with nursing staff, treatment and plan discussed. Discussed with respiratory therapist.    Total critical care time caring for this patient with life threatening, unstable organ failure, including direct patient contact, management of life support systems, review of data including imaging and labs, discussions with other team members and physicians at least 27  Min so far today, excluding procedures. Please note that this chart was generated using voice recognition Dragon dictation software.  Although every effort was made to ensure the accuracy of this automated transcription, some errors in transcription may have occurred.      Yuki Doherty MD  10/5/2021 7:38 PM

## 2021-10-05 NOTE — PROGRESS NOTES
[facility] -   Infectious diseases evaluation  admission date 9/21/2021    reason for consultation:   COVID    Impression :   Current:    Covid, pneumonia  Increase inflammatory markers    ·   Discussion / summary of stay / plan of care   ·   Recommendations   · Took a dose of Actemra 9/22  · protocol 10 mg Decadron till 10/6  · Anticoagulation  · She responded by CRP, started to respond clinically slowly    Remove isolation 10/5  Infection Control Recommendations   · [place] Precautions      Sick since 9/13  Stop isolation 10/5    Antimicrobial Stewardship Recommendations   · Of antibiotics    Coordination ofOutpatient Care:   · Estimated Length of IV antimicrobials:  · Patient will need Midline / picc Catheter Insertion:   · Patient will need SNF:  · Patient will need outpatient wound care:     History of Present Illness:   Initial history:  Anthony Rivera is a 45y.o.-year-old female     Interval changes  10/5/2021   Patient Vitals for the past 8 hrs:   BP Temp Temp src   10/05/21 0845 117/74 97.9 °F (36.6 °C) Axillary       Poor turnaround overnight and she is now on 80% high flow alternating with 100%. She is eating she has no fever, she had taken Actemra, a little depressed, was encouraged on the bedside    9/29: Labs within range, chest x-ray showing progression, the patient is on higher oxygenation, 90% BiPAP and saturating 90% only. Abdomen soft nontender otherwise. Alert    -128-47    9/30:  CRP < 3 -WBC 10  Patient feels better, is on high flow 80%, saturation 88-92% and depends on if she is at rest or moving evaluate. No new positive cultures, trying to wean off oxygen slowly    10/5/21  On high flow 86% oxygen, patient saturating 92%. Alert appropriate, anxious to get better, improving slowly.   Encourage      Allergies:   Cephalexin and Codeine     Review of Systems:     Review of Systems   Constitutional: Negative for activity change and diaphoresis. HENT: Negative for congestion. Eyes: Negative for pain and redness. Respiratory: Positive for shortness of breath. Negative for apnea and chest tightness. Cardiovascular: Negative for chest pain. Gastrointestinal: Negative for abdominal distention. Endocrine: Negative for heat intolerance. Genitourinary: Negative for dysuria, flank pain and frequency. Musculoskeletal: Negative for arthralgias. Skin: Negative for color change. Allergic/Immunologic: Negative for immunocompromised state. Neurological: Negative for dizziness. Hematological: Negative for adenopathy. Psychiatric/Behavioral: Negative for agitation. Physical Examination :       Physical Exam  Constitutional:       General: She is not in acute distress. Appearance: Normal appearance. She is ill-appearing. She is not toxic-appearing or diaphoretic. HENT:      Head: Normocephalic and atraumatic. Nose: Nose normal.   Eyes:      General: No scleral icterus. Conjunctiva/sclera: Conjunctivae normal.      Pupils: Pupils are equal, round, and reactive to light. Cardiovascular:      Rate and Rhythm: Normal rate and regular rhythm. Heart sounds: No murmur heard. No friction rub. Pulmonary:      Effort: No respiratory distress. Breath sounds: Normal breath sounds. Abdominal:      General: There is no distension. Palpations: Abdomen is soft. Tenderness: There is no abdominal tenderness. Genitourinary:     Comments: No hernandez  Musculoskeletal:         General: No swelling, tenderness or deformity. Cervical back: Neck supple. No rigidity or tenderness. Skin:     General: Skin is dry. Coloration: Skin is not jaundiced or pale. Findings: No erythema. Neurological:      General: No focal deficit present. Mental Status: She is alert and oriented to person, place, and time.    Psychiatric:         Mood and Affect: Mood normal.         Behavior: Behavior normal. Past Medical History:     Past Medical History:   Diagnosis Date    Anemia     Chronic back pain     Diabetes mellitus ([facility]     H/O LEEP     Hypertension     Obesity, morbid, BMI 40.0-49.9 ([facility] 9/28/2021    S/P endometrial ablation        Past Surgical  History:     Past Surgical History:   Procedure Laterality Date    ENDOMETRIAL ABLATION      LEEP         Medications:      insulin glargine  35 Units SubCUTAneous BID    furosemide  20 mg IntraVENous Once    potassium chloride  20 mEq Oral Once    dexamethasone  10 mg IntraVENous Daily    vitamin C  500 mg Oral BID    fluticasone  1 spray Each Nostril Daily    cetirizine  10 mg Oral Daily    insulin lispro  0-18 Units SubCUTAneous TID WC    insulin lispro  0-9 Units SubCUTAneous Nightly    famotidine  20 mg Oral Daily    aspirin  81 mg Oral Daily    ferrous sulfate  325 mg Oral Daily with breakfast    sodium chloride flush  5-40 mL IntraVENous 2 times per day    enoxaparin  30 mg SubCUTAneous BID    Vitamin D  2,000 Units Oral Daily       Social History:     Social History     Socioeconomic History    Marital status: Single     Spouse name: Not on file    Number of children: Not on file    Years of education: Not on file    Highest education level: Not on file   Occupational History    Not on file   Tobacco Use    Smoking status: Never Smoker    Smokeless tobacco: Never Used   Substance and Sexual Activity    Alcohol use: Yes     Comment: occa.  Drug use: No    Sexual activity: Not on file   Other Topics Concern    Not on file   Social History Narrative    Not on file     Social Determinants of Health     Financial Resource Strain:     Difficulty of Paying Living Expenses:    Food Insecurity:     Worried About Running Out of Food in the Last Year:     [address] in the Last Year:    Transportation Needs:     Lack of Transportation (Medical):      Lack of Transportation (Non-Medical):    Physical Activity:     Days of Exercise per Week:     Minutes of Exercise per Session:    Stress:     Feeling of Stress :    Social Connections:     Frequency of Communication with Friends and Family:     Frequency of Social Gatherings with Friends and Family:     Attends Jain Services:     Active Member of Clubs or Organizations:     Attends Club or Organization Meetings:     Marital Status:    Intimate Partner Violence:     Fear of Current or Ex-Partner:     Emotionally Abused:     Physically Abused:     Sexually Abused:        Family History:     Family History   Problem Relation Age of Onset    Hypotension Mother     Heart Disease Father     Heart Failure Father       Medical Decision Making:   I have independently reviewed/ordered the following labs:    CBC with Differential:   Recent Labs     10/04/21  0555   WBC 10.6   HGB 15.7*   HCT 43.9      LYMPHOPCT 17*   MONOPCT 5     BMP:  Recent Labs     10/04/21  0555      K 3.6*   CL 96*   CO2 30   BUN 27*   CREATININE 0.67     Hepatic Function Panel:   Recent Labs     10/04/21  0555   PROT 5.5*   LABALBU 3.4*   BILIDIR 0.20   IBILI 0.71   BILITOT 0.91   ALKPHOS 84   ALT 37*   AST 24     No results for input(s): RPR in the last 72 hours. No results for input(s): HIV in the last 72 hours. No results for input(s): BC in the last 72 hours. Lab Results   Component Value Date    CREATININE 0.67 10/04/2021    GLUCOSE 92 10/04/2021       Detailed results: Thank you for allowing us to participate in the care of this patient. Please call with questions. This note is created with the assistance of a speech recognition program.  While intending to generate adocument that actually reflects the content of the visit, the document can still have some errors including those of syntax and sound a like substitutions which may escape proof reading. It such instances, actual meaningcan be extrapolated by contextual diversion.     Christa Rodriguez MD  Office: [phone]  Perfect serve / office [phone]

## 2021-10-05 NOTE — PLAN OF CARE
Problem: Airway Clearance - Ineffective  Goal: Achieve or maintain patent airway  Outcome: Ongoing     Problem: Gas Exchange - Impaired  Goal: Absence of hypoxia  Outcome: Ongoing  Goal: Promote optimal lung function  Outcome: Ongoing     Problem: Breathing Pattern - Ineffective  Goal: Ability to achieve and maintain a regular respiratory rate  Outcome: Ongoing     Problem:  Body Temperature -  Risk of, Imbalanced  Goal: Ability to maintain a body temperature within defined limits  Outcome: Ongoing  Goal: Will regain or maintain usual level of consciousness  Outcome: Ongoing  Goal: Complications related to the disease process, condition or treatment will be avoided or minimized  Outcome: Ongoing     Problem: Isolation Precautions - Risk of Spread of Infection  Goal: Prevent transmission of infection  Outcome: Ongoing     Problem: Nutrition Deficits  Goal: Optimize nutritional status  Outcome: Ongoing     Problem: Risk for Fluid Volume Deficit  Goal: Maintain normal heart rhythm  Outcome: Ongoing  Goal: Maintain absence of muscle cramping  Outcome: Ongoing  Goal: Maintain normal serum potassium, sodium, calcium, phosphorus, and pH  Outcome: Ongoing     Problem: Loneliness or Risk for Loneliness  Goal: Demonstrate positive use of time alone when socialization is not possible  Outcome: Ongoing     Problem: Fatigue  Goal: Verbalize increase energy and improved vitality  Outcome: Ongoing     Problem: Patient Education: Go to Patient Education Activity  Goal: Patient/Family Education  Outcome: Ongoing     Problem: Falls - Risk of:  Goal: Will remain free from falls  Description: Will remain free from falls  Outcome: Ongoing  Goal: Absence of physical injury  Description: Absence of physical injury  Outcome: Ongoing     Problem: Nutrition  Goal: Optimal nutrition therapy  Outcome: Ongoing     Problem: OXYGENATION/RESPIRATORY FUNCTION  Goal: Patient will achieve/maintain normal respiratory rate/effort  Description: Respiratory rate and effort will be within normal limits for the patient  Outcome: Ongoing     Problem: SKIN INTEGRITY  Goal: Skin integrity is maintained or improved  Outcome: Ongoing     Problem: Skin Integrity:  Goal: Will show no infection signs and symptoms  Description: Will show no infection signs and symptoms  Outcome: Ongoing  Goal: Absence of new skin breakdown  Description: Absence of new skin breakdown  Outcome: Ongoing

## 2021-10-05 NOTE — PROGRESS NOTES
Physical Therapy  Facility/Department: [facility] CAR 3  Daily Treatment Note  NAME: Marianne Warren  : 1980  MRN: [id number]    Date of Service: 10/5/2021    Discharge Recommendations:  Patient would benefit from continued therapy after discharge   PT Equipment Recommendations  Equipment Needed: No    Assessment   Body structures, Functions, Activity limitations: Decreased functional mobility ; Decreased balance;Decreased endurance;Decreased ADL status  Assessment: Pt performed BLE supine exercises this session with increased tolerance. Pt's SPO2 dropped to 86% with activity with increased time to recover. Would benefit from continued PT to address further mobility deficits and return to prior level of independence. Prognosis: Good  PT Education: Goals;PT Role;Plan of Care; Functional Mobility Training;Gait Training;Transfer Training  REQUIRES PT FOLLOW UP: Yes  Activity Tolerance  Activity Tolerance: Patient limited by endurance  Activity Tolerance: SPO2 decreasing with minimal exertion. Patient Diagnosis(es): The encounter diagnosis was COVID-19.     has a past medical history of Anemia, Chronic back pain, Diabetes mellitus ([facility] [address] H/O LEEP, Hypertension, Obesity, morbid, BMI 40.0-49.9 ([facility] [address] and S/P endometrial ablation. has a past surgical history that includes LEEP and Endometrial ablation. Restrictions  Restrictions/Precautions  Restrictions/Precautions: Isolation, Up as Tolerated, Fall Risk  Required Braces or Orthoses?: No  Position Activity Restriction  Other position/activity restrictions: High-flow O2  Subjective   General  Chart Reviewed: Yes  Response To Previous Treatment: Patient with no complaints from previous session. Family / Caregiver Present: No  Subjective  Subjective: Pt and RN agreeable to PT this morning. Pt supine in bed upon arrival with no c/o pain. Pt on high flow O2 agreeable to supine LE exercises.   General Comment  Comments: Pt remained supine in bed throughout session. Pain Screening  Patient Currently in Pain: Denies  Vital Signs  Patient Currently in Pain: Denies       Orientation  Orientation  Overall Orientation Status: Within Normal Limits  Cognition   Cognition  Overall Cognitive Status: WFL  Objective   Bed mobility  Supine to Sit: Unable to assess  Sit to Supine: Unable to assess  Scooting: Unable to assess  Comment: Pt remained in bed throughout session     Ambulation  Ambulation?: No  Stairs/Curb  Stairs?: No     Balance  Comments: VIDA d/t respiratory status  Exercises  Straight Leg Raise: x10 BLE  Heelslides: x10 BLE  Hip Abduction: x10 BLE  Knee Short Arc Quad: x10 BLE  Ankle Pumps: x10 BLE  Comments: Pt performed supine BLE exercises, then requested to be done for the day. SPO2 dropped to ~86% with exercises requiring increased time to recover.         AM-PAC Score  AM-PAC Inpatient Mobility Raw Score : 16 (10/05/21 1245)  AM-PAC Inpatient T-Scale Score : 40.78 (10/05/21 1245)  Mobility Inpatient CMS 0-100% Score: 54.16 (10/05/21 1245)  Mobility Inpatient CMS G-Code Modifier : CK (10/05/21 1245)          Goals  Short term goals  Time Frame for Short term goals: 14 visits  Short term goal 1: Perform bed mobility and functional transfers independently  Short term goal 2: Ambulate 300ft without AD and supervision  Short term goal 3: Participate in 30 minutes of therapy with SPO2 >90% to demo increased endurance  Short term goal 4: Ascend/descend 2 steps with HR and SBA    Plan    Plan  Times per week: 3-5x/wk  Current Treatment Recommendations: Strengthening, ROM, Balance Training, Functional Mobility Training, Transfer Training, Stair training, Gait Training, Endurance Training, Home Exercise Program, Safety Education & Training, Patient/Caregiver Education & Training  Safety Devices  Type of devices: Nurse notified, Call light within reach, Gait belt, Left in bed  Restraints  Initially in place: No     Therapy Time   Individual Concurrent Group Co-treatment Time In 1100         Time Out 1124         Minutes 24         Timed Code Treatment Minutes: 25 Minutes       Scarlett Cavanaugh, PTA

## 2021-10-05 NOTE — PROGRESS NOTES
[facility]  Office: [address], DO, Ellen Jb, DO, Dominicdrake Martinez, DO, Júnior Catching Blood, DO, Dinah Carmen MD, Ismael Montenegro MD, Juve Nathan MD, Jayy Grover MD, Dez Arenas MD, Lesvia Sandoval MD, Iman Montenegro MD, Brian Chance, DO, Bobbi Lin, DO, mDitriy Sandy MD,  Pramod Landis DO, Jasmin Issa MD, Frank Calix MD, Nataliya Kuo MD, Guy Macedo MD, , Volodymyr Hernández MD, Alesia Godfrey MD, Geovani Loja MD, Eboni Santana, CNP, Eleonora Tristan, CNP, Wesley Calix, CNS, Rubens Estrada, CNP, Pat Sharpe, CNP, Nikko Camilo, CNP, Gilbert Perez, CNP, Tc Mondragon, CNP, Catheryn Leventhal, PA-C, Stacy Sandoval, [facility], Bean Nevarez, CNP, Hyun Damon, CNP, Meseret Ruth, CNP, Mesha Reynolds, CNP, Karlo Tate, CNP, Arnoldo William, CNP, Annalisa Burnham, [facility], [facility], [address] [place] [address]    Progress Note    10/5/2021    1:19 PM    Name:   Godwin Soriano  MRN:     [id number]     Acct:      [de-identified]   Room:   [address] [place] [address] Day:  15  Admit Date:  9/21/2021  8:43 PM    PCP:   Mort Boas  Code Status:  Full Code    Subjective:     C/C:   Chief Complaint   Patient presents with   Zannie Cowden Other     COVID+    Shortness of Breath     Interval History Status: Improved    Patient seen and examined. FiO2 requirement still high, we discussed LTAC placement and this was also discussed with case management. Patient not making much improvement in terms of getting off high flow nasal cannula. Brief History:     Godwin Soriano is a 45 y.o. Non- / non  female who presents with Other (COVID+) and Shortness of Breath   and is admitted to the hospital for the management of Covid 19 Pneumonia      Patient presented to the emergency room for the concern of worsening COVID-19 Symptoms.   Patient was originally seen Patient was originally diagnosed with the COVID 4 days prior. Patient states since diagnosis she has been having increased shortness of breath, cough  With water-like diarrhea. Upon arrival to the emergency room patient was noted to be hypoxic with saturations in the 80's  she required 6 lpm nc supplemental oxygen. Was seen by infectious disease, started on high-dose steroids, Actemra was given on 9/22/2021. Patient however continue to require more oxygen. She has been feeling fine for the past couple days but still requires 90% FiO2 to maintain oxygen of 91 to 90%. She still feels very winded with ambulation.      Did not receive the vaccine because she felt it was \"rushed. \"    - Uptrendning oxygen requirements prompting transfer to [facility] ICU overnight on 9/27-9/28.    - Slowly weaning. Likely LTAC. - Worsening FiO2 on 10/3-10/4. Review of Systems:     Constitutional:  negative for chills, fevers, sweats, + fatigue  HEENT: reports intermittent left ear fullness. Respiratory:  +cough, +dyspnea on exertion, + shortness of breath,  No wheezing  Cardiovascular:  negative for chest pain, chest pressure/discomfort, lower extremity edema, palpitations  Gastrointestinal:  negative for abdominal pain, constipation, diarrhea, nausea, vomiting  Neurological:  negative for dizziness, headache  Psych: Reports anxiety    Medications: Allergies:     Allergies   Allergen Reactions    Cephalexin      Gets yeast infection    Codeine        Current Meds:   Scheduled Meds:    insulin glargine  35 Units SubCUTAneous BID    dexamethasone  10 mg IntraVENous Daily    vitamin C  500 mg Oral BID    fluticasone  1 spray Each Nostril Daily    cetirizine  10 mg Oral Daily    insulin lispro  0-18 Units SubCUTAneous TID WC    insulin lispro  0-9 Units SubCUTAneous Nightly    famotidine  20 mg Oral Daily    aspirin  81 mg Oral Daily    ferrous sulfate  325 mg Oral Daily with breakfast    sodium chloride flush  5-40 mL IntraVENous 2 times per day    enoxaparin  30 mg SubCUTAneous BID    Vitamin D  2,000 Units Oral Daily     Continuous Infusions:    dextrose      sodium chloride 25 mL (21 1822)     PRN Meds: albuterol sulfate HFA **AND** ipratropium, dextromethorphan-guaiFENesin, glucose, glucagon (rDNA), dextrose, ALPRAZolam, potassium chloride **OR** potassium alternative oral replacement **OR** potassium chloride, glucose, dextrose, glucagon (rDNA), magnesium sulfate, sodium phosphate IVPB **OR** sodium phosphate IVPB **OR** sodium phosphate IVPB, sodium chloride flush, sodium chloride, ondansetron **OR** ondansetron, magnesium hydroxide, acetaminophen **OR** acetaminophen    Data:     Past Medical History:   has a past medical history of Anemia, Chronic back pain, Diabetes mellitus ([facility] [address] H/O LEEP, Hypertension, Obesity, morbid, BMI 40.0-49.9 ([facility] [address] and S/P endometrial ablation. Social History:   reports that she has never smoked. She has never used smokeless tobacco. She reports current alcohol use. She reports that she does not use drugs. Family History:   Family History   Problem Relation Age of Onset    Hypotension Mother     Heart Disease Father     Heart Failure Father        Vitals:  /74   Pulse 59   Temp 97.9 °F (36.6 °C) (Axillary)   Resp 21   Ht 5' 3\" (1.6 m)   Wt 251 lb 8.7 oz (114.1 kg)   SpO2 97%   BMI 44.56 kg/m²   Temp (24hrs), Av.6 °F (36.4 °C), Min:97.2 °F (36.2 °C), Max:97.9 °F (36.6 °C)    Recent Labs     10/05/21  0640 10/05/21  0831 10/05/21  1052 10/05/21  1120   POCGLU 149* 67 154* 172*       I/O (24Hr):     Intake/Output Summary (Last 24 hours) at 10/5/2021 1319  Last data filed at 10/5/2021 0900  Gross per 24 hour   Intake 710 ml   Output 1400 ml   Net -690 ml       Labs:  Hematology:  Recent Labs     10/04/21  0555 10/04/21  1619   WBC 10.6  --    RBC 5.20*  --    HGB 15.7*  --    HCT 43.9  --    MCV 84.4  --    MCH 30.2  --    MCHC 35.8*  --    RDW 13.2  --      --    MPV 12.5  --    DDIMER  --  0.56 Chemistry:  Recent Labs     10/04/21  0555      K 3.6*   CL 96*   CO2 30   GLUCOSE 92   BUN 27*   CREATININE 0.67   ANIONGAP 11   LABGLOM >60   GFRAA >60   CALCIUM 8.7     Recent Labs     10/04/21  0555 10/04/21  0624 10/04/21  2122 10/05/21  0621 10/05/21  0640 10/05/21  0831 10/05/21  1052 10/05/21  1120   PROT 5.5*  --   --   --   --   --   --   --    LABALBU 3.4*  --   --   --   --   --   --   --    AST 24  --   --   --   --   --   --   --    ALT 37*  --   --   --   --   --   --   --    ALKPHOS 84  --   --   --   --   --   --   --    BILITOT 0.91  --   --   --   --   --   --   --    BILIDIR 0.20  --   --   --   --   --   --   --    POCGLU  --    < > 290* 57* 149* 67 154* 172*    < > = values in this interval not displayed. Radiology:  XR CHEST PORTABLE    Result Date: 9/21/2021  Multifocal airspace opacities worrisome multifocal atypical viral pneumonia. Low lung volumes       Physical Examination:        General appearance:  alert, cooperative, ill-appearing, obese  female sitting up in bed. Mental Status:  oriented to person, place and time and normal affect  HEENT: BIPAP present, EOMI, sclera are anti-icteric  Lungs:  Tachypneic, diminished posteriorly, low inspiratory effort, deep breathing induces a couh  Heart:  regular rate and rhythm, no murmur  Abdomen: protuberant, soft, nontender, nondistended, normal bowel sounds, no masses, hepatomegaly, splenomegaly  Extremities:  no edema, redness, tenderness in the calves  Skin:  no gross lesions, rashes, induration    Assessment:        Hospital Problems         Last Modified POA    * (Principal) Pneumonia due to COVID-19 virus 9/28/2021 Yes    Acute respiratory failure with hypoxia ([facility] [address] 9/28/2021 Yes    Obesity, morbid, BMI 40.0-49.9 ([facility] [address] 9/28/2021 Yes    Essential hypertension 9/28/2021 Yes    Elevated C-reactive protein (CRP) 9/28/2021 Yes    COVID-19 9/30/2021 Yes    Type 2 diabetes mellitus with hyperglycemia, with long-term current use of insulin ([facility] 9/28/2021 Yes          Plan:        COVID-19 viral pneumonia:   - Infectious disease following  - Received 10 days of Decadron 20 mg/10 mg. Currently on day #9 of 10 mg Decadron therapy. - Actemra received 9/22    Acute hypoxic respiratory failure:   - On high flow nasal cannula. - Pulmonology following  - Continue combivent scheduled    Sepsis from covid: Secondary to #1    Left ear fullness - continue flonase    Essential hypertension: Currently stable off of meds. Diabetes mellitus type 2:   -Morning blood glucose was 67, decrease to 35 units twice daily    Morbid obesity (BMI 45.69): Weight loss encouraged    GI/DVT prophylaxis: Pepcid, Lovenox twice daily    Disposition; patient still weaning of high flow nasal cannula slowly, will likely need LTAC. D-dimer was 0.56, low suspicion for pulmonary embolism.     Jocy Wolfe DO  10/5/2021  1:19 PM

## 2021-10-06 NOTE — PROGRESS NOTES
Occupational [address]  Occupational Therapy Not Seen Note    DATE: 10/6/2021    NAME: Rom Barone  MRN: [id number]   : 1980      Patient not seen this date for Occupational Therapy due to:    Per RN, pt not appropriate for therapy this date d/t decreased respiratory status     Next Scheduled Treatment: 10/7/21    Electronically signed by NAZANIN Dior on 10/6/2021 at 2:34 PM

## 2021-10-06 NOTE — PROGRESS NOTES
Physical Therapy        Physical Therapy Cancel Note      DATE: 10/6/2021    NAME: Metro Lehigh  MRN: [id number]   : 1980      Patient not seen this date for Physical Therapy due to: Other: Per RN , Pt demo respiratory distress during  Multiple transfer to and from [facility]. RN advice to check back tomorrow.       Electronically signed by Jose Maria Feliciano PTA on 10/6/2021 at 4:04 PM

## 2021-10-06 NOTE — PROGRESS NOTES
PULMONARY & CRITICAL CARE MEDICINE PROGRESS NOTE     Patient:  Zohaib Gudino  MRN: [id number]  Admit date: 9/21/2021  Primary Care Physician: Flako De Los Santos  Consulting Physician: Liseth Patel DO  CODE Status: Full Code  LOS: 11     SUBJECTIVE     CHIEF COMPLAINT/REASON FOR INITIAL CONSULT:   Acute respiratory failure/COVID-19 pneumonia    BRIEF HOSPITAL COURSE:   The patient is a 45 y.o. female history of diabetes mellitus, hypertension, morbid obesity  unvaccinated was admitted with shortness of breath and diagnosed with COVID-19 pneumonia. She apparently was diagnosed about 4 days ago. There was associated cough that is mostly dry and also having some diarrhea. Patient required increasing oxygen with initial oxygen saturation being in the 80s. No previous history of lung disease  Patient is a non-smoker    INTERVAL HISTORY:  10/02/21  Pt was seen and examined at bedside. Overnight events noted, chart seen labs seen and medications reviewed. Afebrile overnight. Hemodynamically stable. She remains on BiPAP at night 20/10 with 80% FiO2. During the daytime yesterday she did not require BiPAP and she is on high flow nasal cannula at 40 L and 80%. Urine output 900 mL in last 24 hours. Patient is getting Lasix once daily currently he may need intermittent extra dose of Lasix. Today's last day of Decadron    REVIEW OF SYSTEMS:  Review of Systems   Constitutional: Positive for fatigue. Negative for appetite change and fever. HENT: Negative for postnasal drip, rhinorrhea, sore throat, trouble swallowing and voice change. Eyes: Negative for redness and visual disturbance. Respiratory: Positive for cough and shortness of breath. Negative for wheezing. Cardiovascular: Negative for chest pain, palpitations and leg swelling. Gastrointestinal: Negative for abdominal pain, constipation, diarrhea, nausea and vomiting. Endocrine: Negative for polyuria.    Genitourinary: Negative for dysuria, frequency, hematuria and urgency. Musculoskeletal: Negative. Allergic/Immunologic: Negative. Neurological: Negative for dizziness, syncope, speech difficulty and headaches. Hematological: Negative for adenopathy. Does not bruise/bleed easily. Psychiatric/Behavioral: Negative. pulm  OBJECTIVE     PaO2/FiO2 RATIO:  No results for input(s): POCPO2 in the last 72 hours. FiO2 : 80 %     VITAL SIGNS:   LAST:  /81   Pulse 88   Temp 97.5 °F (36.4 °C) (Axillary)   Resp 26   Ht 5' 3\" (1.6 m)   Wt 254 lb 3.1 oz (115.3 kg)   SpO2 95%   BMI 45.03 kg/m²   8-24 HR RANGE:  TEMP Temp  Av.8 °F (36.6 °C)  Min: 97.3 °F (36.3 °C)  Max: 98.1 °F (68.5 °C)   BP Systolic (30EAM), ZWK:164 , Min:99 , FYY:465      Diastolic (70BSM), FGC:69, Min:63, Max:81     PULSE Pulse  Av.9  Min: 64  Max: 103   RR Resp  Av.2  Min: 23  Max: 30   O2 SAT SpO2  Av.8 %  Min: 93 %  Max: 97 %   OXYGEN DELIVERY O2 Flow Rate (L/min)  Av.5 L/min  Min: 50 L/min  Max: 60 L/min        SYSTEMIC EXAMINATION:   General appearance - Ill-appearing, mild to moderate respiratory distress  Mental status - awake & alert, follows commands  Eyes - pupils equal and reactive, sclera anicteric  Mouth - mucous membranes moist, pharynx normal without lesions. Large tongue Mallampati 2  Neck -Short thick neck supple, no significant adenopathy, carotids upstroke normal bilaterally, no bruits  Chest - There is no intercostal recession or use of accessory muscles. Breath sounds bilaterally were dimnished to auscultation at bases. There were mild crackles present at bases. No expiratory wheezing and no rhonchi  Heart - normal rate, regular rhythm, normal S1, S2, no murmurs, rubs, clicks or gallops  Abdomen - soft, nontender, nondistended, no masses or organomegaly  Neurological - non-focal  Extremities - peripheral pulses normal, mild bilateral pedal edema, no clubbing or cyanosis.   No calf tenderness  Skin - normal coloration and turgor, no rashes, no suspicious skin lesions noted     DATA REVIEW     Medications: Current Inpatient  Scheduled Meds:   dexamethasone  10 mg IntraVENous Daily    furosemide  20 mg IntraVENous Daily    insulin glargine  50 Units SubCUTAneous BID    vitamin C  500 mg Oral BID    fluticasone  1 spray Each Nostril Daily    cetirizine  10 mg Oral Daily    insulin lispro  0-18 Units SubCUTAneous TID WC    insulin lispro  0-9 Units SubCUTAneous Nightly    famotidine  20 mg Oral Daily    aspirin  81 mg Oral Daily    ferrous sulfate  325 mg Oral Daily with breakfast    sodium chloride flush  5-40 mL IntraVENous 2 times per day    enoxaparin  30 mg SubCUTAneous BID    Vitamin D  2,000 Units Oral Daily     Continuous Infusions:   dextrose      sodium chloride 25 mL (09/22/21 1822)       INPUT/OUTPUT:  In: 640 [P.O.:640]  Out: 1900 [Urine:1900]  Date 10/02/21 0000 - 10/02/21 2359   Shift 0644-6116 3559-0615 8675-6579 24 Hour Total   INTAKE   Shift Total(mL/kg)       OUTPUT   Urine(mL/kg/hr)  1100  1100   Shift Total(mL/kg)  1100(9.5)  1100(9.5)   Weight (kg) 115.3 115.3 115.3 115.3        LABS:  ABGs:   No results for input(s): POCPH, POCPCO2, POCPO2, POCHCO3, QIHT5NRS in the last 72 hours. CBC:   Recent Labs     09/30/21  0507 10/02/21  0547   WBC 10.8 11.9*   HGB 15.2* 15.7*   HCT 45.1 45.7   MCV 87.4 85.4    216   LYMPHOPCT 10* 12*   RBC 5.16* 5.35*   MCH 29.5 29.3   MCHC 33.7 34.4   RDW 13.2 13.3     CRP:   Recent Labs     09/30/21 0507   CRP <3.0     LDH:   No results for input(s): LDH in the last 72 hours. BMP:   Recent Labs     09/30/21  0507 10/02/21  0547    138   K 4.0 4.1    97*   CO2 24 28   BUN 30* 26*   CREATININE 0.56 0.75   GLUCOSE 194* 194*     Liver Function Test:   No results for input(s): PROT, LABALBU, ALT, AST, GGT, ALKPHOS, BILITOT in the last 72 hours. Coagulation Profile:   No results for input(s): INR, PROTIME, APTT in the last 72 hours.   D-Dimer:  No results for input(s): DDIMER in the last 72 hours. Ferritin:    No results for input(s): FERRITIN in the last 72 hours. Lactic Acid:  No results for input(s): LACTA in the last 72 hours. Cardiac Enzymes:  No results for input(s): CKTOTAL, CKMB, CKMBINDEX, TROPONINI in the last 72 hours. Invalid input(s): TROPONIN, HSTROP  BNP/ProBNP:   No results for input(s): BNP, PROBNP in the last 72 hours. Triglycerides:  No results for input(s): TRIG in the last 72 hours. Microbiology:  Urine Culture:  No components found for: CURINE  Blood Culture:  No components found for: CBLOOD, CFUNGUSBL  Sputum Culture:  No components found for: CSPUTUM  No results for input(s): SPECDESC, SPECIAL, CULTURE, STATUS, ORG, CDIFFTOXPCR, CAMPYLOBPCR, SALMONELLAPC, SHIGAPCR, SHIGELLAPCR, MPNEUG, MPNEUM, LACTOQL in the last 72 hours. No results for input(s): SPUTUM, SPECDESC, SPECIAL, CULTURE, STATUS, ORG, CDIFFTOXPCR, MPNEUM, MPNEUG in the last 72 hours. Invalid input(s): CURINE, CBLOOD, CFUNGUSBL     Pathology:    Radiology Reports:  XR CHEST PORTABLE   Final Result   Limited exam, findings suggest progression in extensive bilateral coarse   alveolar infiltrates suggesting progression in multifocal pneumonia in COVID   positive patient         XR CHEST (SINGLE VIEW FRONTAL)   Final Result   Bilateral diffuse pulmonary edema and or pneumonia without definite effusion. No cardiomegaly was identified. Slight improvement bilaterally has occurred from 09/21/2021. XR CHEST PORTABLE   Final Result   Multifocal airspace opacities worrisome multifocal atypical viral pneumonia. Low lung volumes              Echocardiogram:   No results found for this or any previous visit.        ASSESSMENT AND PLAN     Assessment:    // Acute hypoxic respiratory failure  // Acute respiratory distress syndrome  // Bilateral multifocal pneumonia due to COVID 19 infection  // Diabetes mellitus.  // Morbid obesity  // Likely obstructive sleep apnea/hypoventilation  // Hypertension  // E. coli in urine      Plan:  - Currently on BIPAP 40L 65% FiO2 saturating >90%  -High flow nasal cannula during the daytime  - continue decadron 10mg last day of Decadron  -She received Lasix yesterday we will give Lasix again today 20 mg may need extra dose of Lasix  -Follow electrolytes BUN and creatinine Lasix  -Monitor urine output renal function and electrolytes  - Daily CRP: 9/21 - 205, 128, 47, <3  - Tested positive:  9/17/21  - Symptom onset:  9/16/21  - Out of Isolation: No  - Vaccinated: No  - Remdesivir: 9/22  - Decadron:  Restarted 9/2 -10 mg daily  - Actemra: 9/22/21  - Co-infection: No. Asymptomatic bacteruria E Coli in urine.  - Fluid Balance: euvolemic  - Glycemic Control: Per primary team.     Discussed with respiratory therapist treatment plan discussed  Discussed with nursing staff. Total critical care time caring for this patient with life threatening, unstable organ failure, including direct patient contact, management of life support systems, review of data including imaging and labs, discussions with other team members and physicians at least 27  Min so far today, excluding procedures. Rickey Blackman MD.  Pulmonary/critical care attending  [address] [place] [address]  10/6/2021, 12:17 PM      This patient was evaluated in the context of the global SARS-CoV-2 (COVID-19) pandemic, which necessitated considerations that the patient either has COVID-19 infection or is at risk of infection with COVID-19. Institutional protocols and algorithms that pertain to the evaluation & management of patients with COVID-19 or those at risk for COVID-19 are in a state of rapid changes based on information released by regulatory bodies including the CDC and federal and state organizations. These policies and algorithms were followed during the patient's care.     Please note that this chart was generated using voice recognition Dragon dictation software. Although every effort was made to ensure the accuracy of this automated transcription, some errors in transcription may have occurred.

## 2021-10-06 NOTE — PROGRESS NOTES
[facility]  Office: [address], DO, Tj Bernabe, DO, Cristiana Jacobsen, DO, Niki Desire Blood, DO, Winter Gaffney MD, Isac Gleason MD, Len Ontiveros MD, Kvng De La Rosa MD, Zo Cazares MD, Lalitha Coughlin MD, Serena Iqbal MD, Shabnam Joseph, DO, Edna Herman, DO, Marla Walters MD,  Bebe Duncan, DO, Tamy Issa MD, Loreta Tejada MD, Karen Reilly MD, Arielle Schulte MD, , Noemy Pickering MD, India Gregory MD, Ruma Lopez MD, Jourdan Blank, Harvey Cesar, CNP, Abner Gay, CNP, Daniela Mask, CNS, Zulma [place], CNP, Xochilt Can, CNP, Marianne Kay, CNP, Tyler Wolf, CNP, Hussein Ghosh, CNP, Lendel Nissen, PA-C, Jane Lopez, [facility], Shaji Cuello, CNP, Gabrielle Dooley, CNP, Magali Jackson, CNP, Marco Laguerre, CNP, Ashlyn Sy, CNP, Gael Ferreira, CNP, Nicky Oneal, CNP, [facility], [address] [place] [address]    Progress Note    10/6/2021    9:58 AM    Name:   Ervin Umaña  MRN:     [id number]     Acct:      [de-identified]   Room:   [address] [place] [address] Day:  13  Admit Date:  9/21/2021  8:43 PM    PCP:   Saria Iron  Code Status:  Full Code    Subjective:     C/C:   Chief Complaint   Patient presents with   Yamel Me Other     COVID+    Shortness of Breath     Interval History Status: Improved    Still requiring high FiO2 requirements. Otherwise has been stable last 24 hours. Blood glucose was 57 this morning, still on high flow nasal cannula. Isolation was removed yesterday    Brief History:     Ervin Umaña is a 45 y.o. Non- / non  female who presents with Other (COVID+) and Shortness of Breath   and is admitted to the hospital for the management of Covid 19 Pneumonia      Patient presented to the emergency room for the concern of worsening COVID-19 Symptoms. Patient was originally seen Patient was originally diagnosed with the COVID 4 days prior.   Patient states since 337* 105*   BUN 27* 34* 31*   CREATININE 0.67 0.71 0.54   ANIONGAP 11 14 11   LABGLOM >60 >60 >60   GFRAA >60 >60 >60   CALCIUM 8.7 9.1 8.8     Recent Labs     10/04/21  0555 10/04/21  0624 10/05/21  0831 10/05/21  1052 10/05/21  1120 10/05/21  1545 10/05/21  1848 10/06/21  0328 10/06/21  0747   PROT 5.5*  --   --   --   --   --   --  5.5*  --    LABALBU 3.4*  --   --   --   --   --   --  3.3*  --    AST 24  --   --   --   --   --   --  25  --    ALT 37*  --   --   --   --   --   --  46*  --    ALKPHOS 84  --   --   --   --   --   --  82  --    BILITOT 0.91  --   --   --   --   --   --  0.92  --    BILIDIR 0.20  --   --   --   --   --   --  0.22  --    POCGLU  --    < > 67 154* 172* 354* 339*  --  57*    < > = values in this interval not displayed. Radiology:  XR CHEST PORTABLE    Result Date: 9/21/2021  Multifocal airspace opacities worrisome multifocal atypical viral pneumonia. Low lung volumes       Physical Examination:        General appearance:  alert, cooperative, ill-appearing, obese  female sitting up in bed. Mental Status:  oriented to person, place and time and normal affect  HEENT: BIPAP present, EOMI, sclera are anti-icteric  Lungs:  Tachypneic, diminished posteriorly, low inspiratory effort, deep breathing induces a couh  Heart:  regular rate and rhythm, no murmur  Abdomen: protuberant, soft, nontender, nondistended, normal bowel sounds, no masses, hepatomegaly, splenomegaly  Extremities:  no edema, redness, tenderness in the calves  Skin:  no gross lesions, rashes, induration    Assessment:        Hospital Problems         Last Modified POA    * (Principal) Pneumonia due to COVID-19 virus 9/28/2021 Yes    Acute respiratory failure with hypoxia ([address] 9/28/2021 Yes    Obesity, morbid, BMI 40.0-49.9 ([address] 9/28/2021 Yes    Essential hypertension 9/28/2021 Yes    Elevated C-reactive protein (CRP) 9/28/2021 Yes    COVID-19 9/30/2021 Yes    Type 2 diabetes mellitus with hyperglycemia, with long-term current use of insulin ([facility] 9/28/2021 Yes          Plan:        COVID-19 viral pneumonia:   - Infectious disease following  -Completed Decadron therapy high-dose  - Actemra received 9/22  -Out of isolation 10-5-2021    Acute hypoxic respiratory failure:   - On high flow nasal cannula. - Pulmonology following  - Continue combivent scheduled    Sepsis from covid: Secondary to #1    Left ear fullness - continue flonase    Essential hypertension: Currently stable off of meds. Diabetes mellitus type 2:   -  decrease lantus to 25 units BID    Morbid obesity (BMI 45.69): Weight loss encouraged    GI/DVT prophylaxis: Pepcid, Lovenox twice daily    Disposition; transfer out of ICU, transfer out of Covid isolation. Patient can transfer to stepdown non-Covid when bed is available. Decrease Lantus to 25 units twice daily. Completed Decadron.   LTAC whenever available    Eva Webb DO  10/6/2021  9:58 AM

## 2021-10-06 NOTE — PLAN OF CARE
Problem: Airway Clearance - Ineffective  Goal: Achieve or maintain patent airway  10/6/2021 1205 by Pio Dockery RN  Outcome: Ongoing  10/6/2021 1203 by Pio Dockery RN  Outcome: Ongoing  10/6/2021 1107 by Pio Dcokery RN  Outcome: Ongoing     Problem: Gas Exchange - Impaired  Goal: Absence of hypoxia  10/6/2021 1205 by Pio Dockery, RN  Outcome: Ongoing  10/6/2021 1203 by Pio Dockery RN  Outcome: Ongoing  10/6/2021 1107 by Pio Dockery RN  Outcome: Ongoing  Goal: Promote optimal lung function  10/6/2021 1205 by Pio Dockery RN  Outcome: Ongoing  10/6/2021 1203 by Pio Dockery RN  Outcome: Ongoing  10/6/2021 1107 by Pio Dockery RN  Outcome: Ongoing     Problem: Breathing Pattern - Ineffective  Goal: Ability to achieve and maintain a regular respiratory rate  10/6/2021 1205 by Pio Dockery RN  Outcome: Ongoing  10/6/2021 1203 by Pio Dockery RN  Outcome: Ongoing  10/6/2021 1107 by Pio Dockery RN  Outcome: Ongoing     Problem:  Body Temperature -  Risk of, Imbalanced  Goal: Ability to maintain a body temperature within defined limits  10/6/2021 1205 by Pio Dockery, RN  Outcome: Ongoing  10/6/2021 1203 by Pio Dockery RN  Outcome: Ongoing  10/6/2021 1107 by Pio Dockery RN  Outcome: Ongoing  Goal: Will regain or maintain usual level of consciousness  10/6/2021 1205 by Pio Dockery, RN  Outcome: Ongoing  10/6/2021 1203 by Pio Dockery RN  Outcome: Ongoing  10/6/2021 1107 by Pio Dockery RN  Outcome: Ongoing  Goal: Complications related to the disease process, condition or treatment will be avoided or minimized  10/6/2021 1205 by Pio Dockery RN  Outcome: Ongoing  10/6/2021 1203 by Pio Dockery, RN  Outcome: Ongoing  10/6/2021 1107 by Pio Dockery RN  Outcome: Ongoing     Problem: Isolation Precautions - Risk of Spread of Infection  Goal: Prevent transmission of infection  10/6/2021 1205 by Pio Dockery RN  Outcome: Ongoing  10/6/2021 1203 by Pio Dockery RN  Outcome: Ongoing  10/6/2021 1107 by Idalmis Mayorga RN  Outcome: Ongoing     Problem: Nutrition Deficits  Goal: Optimize nutritional status  10/6/2021 1205 by Idalmis Mayorga RN  Outcome: Ongoing  10/6/2021 1203 by Idalmis Mayorga RN  Outcome: Ongoing  10/6/2021 1107 by Idalmis Mayorga RN  Outcome: Ongoing     Problem: Risk for Fluid Volume Deficit  Goal: Maintain normal heart rhythm  10/6/2021 1205 by Idalmis Mayorga RN  Outcome: Ongoing  10/6/2021 1203 by Idalmis Mayorga RN  Outcome: Ongoing  10/6/2021 1107 by Idalmis Mayorga RN  Outcome: Ongoing  Goal: Maintain absence of muscle cramping  10/6/2021 1205 by Idalmis Mayorga RN  Outcome: Ongoing  10/6/2021 1203 by Idalmis Mayorga RN  Outcome: Ongoing  10/6/2021 1107 by Idalmis Mayorga RN  Outcome: Ongoing  Goal: Maintain normal serum potassium, sodium, calcium, phosphorus, and pH  10/6/2021 1205 by Idalmis Mayorga RN  Outcome: Ongoing  10/6/2021 1203 by Idalmis Mayorga RN  Outcome: Ongoing  10/6/2021 1107 by Idalmis Mayorga RN  Outcome: Ongoing     Problem: Loneliness or Risk for Loneliness  Goal: Demonstrate positive use of time alone when socialization is not possible  10/6/2021 1205 by Idalmis Mayorga RN  Outcome: Ongoing  10/6/2021 1203 by Idalmis Mayorga RN  Outcome: Ongoing  10/6/2021 1107 by Idalmis Mayorga RN  Outcome: Ongoing     Problem: Fatigue  Goal: Verbalize increase energy and improved vitality  10/6/2021 1205 by Idalmis Mayorga RN  Outcome: Ongoing  10/6/2021 1203 by Idalmis Mayorga RN  Outcome: Ongoing  10/6/2021 1107 by Idalmis Mayorga RN  Outcome: Ongoing     Problem: Patient Education: Go to Patient Education Activity  Goal: Patient/Family Education  10/6/2021 1205 by Idalmis Mayorga RN  Outcome: Ongoing  10/6/2021 1203 by Idalmis Mayorga RN  Outcome: Ongoing  10/6/2021 1107 by Idalmis Janus, RN  Outcome: Ongoing     Problem: Falls - Risk of:  Goal: Will remain free from falls  Description: Will remain free from falls  10/6/2021 1205 by Idalmis Mayorga RN  Outcome: Ongoing  10/6/2021 1203 by Idalmis Mayorga RN  Outcome: Ongoing  10/6/2021 1107 by Nick Frank RN  Outcome: Ongoing  Goal: Absence of physical injury  Description: Absence of physical injury  10/6/2021 1205 by Nick Frank RN  Outcome: Ongoing  10/6/2021 1203 by Nick Frank RN  Outcome: Ongoing  10/6/2021 1107 by Nick Frank RN  Outcome: Ongoing     Problem: Nutrition  Goal: Optimal nutrition therapy  10/6/2021 1205 by Nick Frank RN  Outcome: Ongoing  10/6/2021 1203 by Nick Frank RN  Outcome: Ongoing  10/6/2021 1107 by Nick Frank RN  Outcome: Ongoing     Problem: OXYGENATION/RESPIRATORY FUNCTION  Goal: Patient will achieve/maintain normal respiratory rate/effort  Description: Respiratory rate and effort will be within normal limits for the patient  10/6/2021 1205 by Nick Frank RN  Outcome: Ongoing  10/6/2021 1203 by Nick Frank RN  Outcome: Ongoing  10/6/2021 1107 by Nick Frank RN  Outcome: Ongoing     Problem: SKIN INTEGRITY  Goal: Skin integrity is maintained or improved  10/6/2021 1205 by Nick Frank RN  Outcome: Ongoing  10/6/2021 1203 by Nick Frank RN  Outcome: Ongoing  10/6/2021 1107 by Nick Frank RN  Outcome: Ongoing     Problem: Skin Integrity:  Goal: Will show no infection signs and symptoms  Description: Will show no infection signs and symptoms  10/6/2021 1205 by Nick Frank RN  Outcome: Ongoing  10/6/2021 1203 by Nick Frank RN  Outcome: Ongoing  10/6/2021 1107 by Nick Frank RN  Outcome: Ongoing  Goal: Absence of new skin breakdown  Description: Absence of new skin breakdown  10/6/2021 1205 by Nick Frank RN  Outcome: Ongoing  10/6/2021 1203 by Nick Frank RN  Outcome: Ongoing  10/6/2021 1107 by Nick Frank RN  Outcome: Ongoing

## 2021-10-06 NOTE — PROGRESS NOTES
[facility] -   Infectious diseases evaluation  admission date 9/21/2021    reason for consultation:   COVID    Impression :   Current:    Covid, pneumonia  Increase inflammatory markers    ·   Discussion / summary of stay / plan of care   ·   Recommendations   · Took a dose of Actemra 9/22  · protocol 10 mg Decadron till 10/6  · Anticoagulation  · She responded by CRP, started to respond clinically slowly   · Repeat CRP chest x-ray in the morning due to slow clinical response    Remove isolation 10/5  Infection Control Recommendations   · [place] Precautions      Sick since 9/13  Stop isolation 10/5    Antimicrobial Stewardship Recommendations   · Of antibiotics    Coordination ofOutpatient Care:   · Estimated Length of IV antimicrobials:  · Patient will need Midline / picc Catheter Insertion:   · Patient will need SNF:  · Patient will need outpatient wound care:     History of Present Illness:   Initial history:  Abelardo Dhaliwal is a 45y.o.-year-old female     Interval changes  10/6/2021   Patient Vitals for the past 8 hrs:   BP Temp Temp src Pulse Resp SpO2 Weight   10/06/21 0915  97.9 °F (36.6 °C) Axillary 68 22     10/06/21 0841     26 92 %    10/06/21 0600       253 lb 12 oz (115.1 kg)   10/06/21 0405     24     10/06/21 0400 113/77 97.8 °F (36.6 °C) Axillary 65 28 98 %        Poor turnaround overnight and she is now on 80% high flow alternating with 100%. She is eating she has no fever, she had taken Actemra, a little depressed, was encouraged on the bedside    9/29: Labs within range, chest x-ray showing progression, the patient is on higher oxygenation, 90% BiPAP and saturating 90% only. Abdomen soft nontender otherwise. Alert    -128-47    9/30:  CRP < 3 -WBC 10  Patient feels better, is on high flow 80%, saturation 88-92% and depends on if she is at rest or moving evaluate.   No new positive cultures, trying to wean off oxygen slowly    10/5  On high flow 86% oxygen, patient saturating 92%. Alert appropriate, anxious to get better, improving slowly. Encourage    10/6  Isolation removed today, patient is on high flow FiO2 82%, similar to before. She looks good feels better though. Eating better, limited mobility due to desaturation. Allergies:   Cephalexin and Codeine     Review of Systems:     Review of Systems   Constitutional: Negative for activity change and diaphoresis. HENT: Negative for congestion. Eyes: Negative for pain and redness. Respiratory: Positive for shortness of breath. Negative for apnea and chest tightness. Cardiovascular: Negative for chest pain. Gastrointestinal: Negative for abdominal distention. Endocrine: Negative for heat intolerance. Genitourinary: Negative for dysuria, flank pain and frequency. Musculoskeletal: Negative for arthralgias. Skin: Negative for color change. Allergic/Immunologic: Negative for immunocompromised state. Neurological: Negative for dizziness. Hematological: Negative for adenopathy. Psychiatric/Behavioral: Negative for agitation. Physical Examination :       Physical Exam  Constitutional:       General: She is not in acute distress. Appearance: Normal appearance. She is ill-appearing. She is not toxic-appearing or diaphoretic. HENT:      Head: Normocephalic and atraumatic. Nose: Nose normal.   Eyes:      General: No scleral icterus. Conjunctiva/sclera: Conjunctivae normal.      Pupils: Pupils are equal, round, and reactive to light. Cardiovascular:      Rate and Rhythm: Normal rate and regular rhythm. Heart sounds: No murmur heard. No friction rub. Pulmonary:      Effort: No respiratory distress. Breath sounds: Normal breath sounds. Abdominal:      General: There is no distension. Palpations: Abdomen is soft. Tenderness: There is no abdominal tenderness.    Genitourinary:     Comments: No hernandez  Musculoskeletal:         General: No swelling, tenderness or deformity. Cervical back: Neck supple. No rigidity or tenderness. Skin:     General: Skin is dry. Coloration: Skin is not jaundiced or pale. Findings: No erythema. Neurological:      General: No focal deficit present. Mental Status: She is alert and oriented to person, place, and time. Psychiatric:         Mood and Affect: Mood normal.         Behavior: Behavior normal.         Past Medical History:     Past Medical History:   Diagnosis Date    Anemia     Chronic back pain     Diabetes mellitus ([facility]     H/O LEEP     Hypertension     Obesity, morbid, BMI 40.0-49.9 ([facility] 9/28/2021    S/P endometrial ablation        Past Surgical  History:     Past Surgical History:   Procedure Laterality Date    ENDOMETRIAL ABLATION      LEEP         Medications:      insulin glargine  25 Units SubCUTAneous BID    enoxaparin  40 mg SubCUTAneous Daily    potassium chloride  20 mEq Oral Once    furosemide  20 mg IntraVENous Once    cetirizine  10 mg Oral Daily    insulin lispro  0-18 Units SubCUTAneous TID WC    insulin lispro  0-9 Units SubCUTAneous Nightly    famotidine  20 mg Oral Daily    aspirin  81 mg Oral Daily    ferrous sulfate  325 mg Oral Daily with breakfast    sodium chloride flush  5-40 mL IntraVENous 2 times per day       Social History:     Social History     Socioeconomic History    Marital status: Single     Spouse name: Not on file    Number of children: Not on file    Years of education: Not on file    Highest education level: Not on file   Occupational History    Not on file   Tobacco Use    Smoking status: Never Smoker    Smokeless tobacco: Never Used   Substance and Sexual Activity    Alcohol use: Yes     Comment: renetta.     Drug use: No    Sexual activity: Not on file   Other Topics Concern    Not on file   Social History Narrative    Not on file     Social Determinants of Health Financial Resource Strain:     Difficulty of Paying Living Expenses:    Food Insecurity:     Worried About Running Out of Food in the Last Year:     [address] in the Last Year:    Transportation Needs:     Lack of Transportation (Medical):  Lack of Transportation (Non-Medical):    Physical Activity:     Days of Exercise per Week:     Minutes of Exercise per Session:    Stress:     Feeling of Stress :    Social Connections:     Frequency of Communication with Friends and Family:     Frequency of Social Gatherings with Friends and Family:     Attends Sabianist Services:     Active Member of Clubs or Organizations:     Attends Club or Organization Meetings:     Marital Status:    Intimate Partner Violence:     Fear of Current or Ex-Partner:     Emotionally Abused:     Physically Abused:     Sexually Abused:        Family History:     Family History   Problem Relation Age of Onset    Hypotension Mother     Heart Disease Father     Heart Failure Father       Medical Decision Making:   I have independently reviewed/ordered the following labs:    CBC with Differential:   Recent Labs     10/04/21  0555 10/06/21  0328   WBC 10.6 11.8*   HGB 15.7* 15.2*   HCT 43.9 44.9    194   LYMPHOPCT 17* 16*   MONOPCT 5 6     BMP:  Recent Labs     10/05/21  1623 10/06/21  0328   * 137   K 4.0 3.8   CL 94* 98   CO2 26 28   BUN 34* 31*   CREATININE 0.71 0.54     Hepatic Function Panel:   Recent Labs     10/04/21  0555 10/06/21  0328   PROT 5.5* 5.5*   LABALBU 3.4* 3.3*   BILIDIR 0.20 0.22   IBILI 0.71 0.70   BILITOT 0.91 0.92   ALKPHOS 84 82   ALT 37* 46*   AST 24 25     No results for input(s): RPR in the last 72 hours. No results for input(s): HIV in the last 72 hours. No results for input(s): BC in the last 72 hours. Lab Results   Component Value Date    CREATININE 0.54 10/06/2021    GLUCOSE 105 10/06/2021       Detailed results:         Thank you for allowing us to participate in the care of this patient. Please call with questions. This note is created with the assistance of a speech recognition program.  While intending to generate adocument that actually reflects the content of the visit, the document can still have some errors including those of syntax and sound a like substitutions which may escape proof reading. It such instances, actual meaningcan be extrapolated by contextual diversion.     Vince Gary MD  Office: [phone]  Perfect serve / office [phone]

## 2021-10-07 NOTE — CONSULTS
CONSULTING SERVICE: Otolaryngology-Head and Neck Surgery    REASON FOR CONSULT:  Godwin Soriano is a 45 y.o. female seen today for   Chief Complaint   Patient presents with    Other     COVID+    Shortness of Breath       HISTORY OF PRESENT ILLNESS:   45year old with COVID on HFNC O2 complaining of new onset left ear pain  Complains of hearing loss and pressure  No vertigo  No history of ear infections or ear issues  24-48 hours of ear pain    REVIEW OF SYSTEMS:   General ROS: SOB,   Psychological ROS: negative  Ophthalmic ROS: negative  ENT ROS: left ear pain  Allergy and Immunology ROS: negative  Hematological and Lymphatic ROS: negative  Endocrine ROS: Type 2 diabetes  Respiratory ROS: negative  Cardiovascular ROS: negative  Gastrointestinal ROS: negative  Musculoskeletal ROS: lower extremity pain    PAST HISTORY:   Past Medical History:   Diagnosis Date    Anemia     Chronic back pain     Diabetes mellitus ([facility] [address]     H/O LEEP     Hypertension     Obesity, morbid, BMI 40.0-49.9 ([facility] [address] 9/28/2021    S/P endometrial ablation      Past Surgical History:   Procedure Laterality Date    ENDOMETRIAL ABLATION      LEEP       Family History   Problem Relation Age of Onset    Hypotension Mother     Heart Disease Father     Heart Failure Father          Social Connections:     Frequency of Communication with Friends and Family:     Frequency of Social Gatherings with Friends and Family:     Attends Sikh Services:     Active Member of Clubs or Organizations:     Attends Club or Organization Meetings:     Marital Status:         MEDICATIONS:   Current Facility-Administered Medications   Medication Dose Route Frequency Provider Last Rate Last Admin    traMADol (ULTRAM) tablet 50 mg  50 mg Oral Q4H PRN Juve Nathan MD   50 mg at 10/07/21 1055    celecoxib (CELEBREX) capsule 50 mg  50 mg Oral BID Bobbi Lin DO   50 mg at 10/07/21 1354    insulin glargine (LANTUS) injection vial 25 Units  25 Units SubCUTAneous BID Chandrakant Edmonton, DO   25 Units at 10/07/21 0759    enoxaparin (LOVENOX) injection 40 mg  40 mg SubCUTAneous Daily Chandrakant [place], DO   40 mg at 10/07/21 0751    dextromethorphan-guaiFENesin (ROBITUSSIN-DM)  MG/5ML liquid 10 mL  10 mL Oral Q4H PRN Myladari Monterroso APRN - CNP   10 mL at 10/06/21 0759    albuterol sulfate  (90 Base) MCG/ACT inhaler 2 puff  2 puff Inhalation Q4H PRN Pedro Tobar MD        And    ipratropium (ATROVENT HFA) 17 MCG/ACT inhaler 2 puff  2 puff Inhalation Q4H PRN Pedro Tobar MD        cetirizine (ZYRTEC) tablet 10 mg  10 mg Oral Daily Columbia Jest, APRN - NP   10 mg at 10/07/21 0753    insulin lispro (HUMALOG) injection vial 0-18 Units  0-18 Units SubCUTAneous TID WC Christian Owenst, APRN - NP   15 Units at 10/07/21 1230    insulin lispro (HUMALOG) injection vial 0-9 Units  0-9 Units SubCUTAneous Nightly Columbia Jest, APRN - NP   6 Units at 10/06/21 2105    glucose (GLUTOSE) 40 % oral gel 15 g  15 g Oral PRN Christian Jest, APRN - NP        glucagon (rDNA) injection 1 mg  1 mg IntraMUSCular PRN Christian Jest, APRN - NP        dextrose 5 % solution  100 mL/hr IntraVENous PRN Columbia Jest, APRN - NP        ALPRAZolam [place]) tablet 0.25 mg  0.25 mg Oral TID PRN Christian Jest, APRN - NP   0.25 mg at 10/06/21 2101    potassium chloride (KLOR-CON M) extended release tablet 40 mEq  40 mEq Oral PRN Sebastian Alee, APRN - CNP   40 mEq at 09/22/21 0150    Or    potassium bicarb-citric acid (EFFER-K) effervescent tablet 40 mEq  40 mEq Oral PRN Sebastian Alee, APRN - CNP        Or    potassium chloride 10 mEq/100 mL IVPB (Peripheral Line)  10 mEq IntraVENous PRN Sebastian Alee, APRN - CNP        glucose (GLUTOSE) 40 % oral gel 15 g  15 g Oral PRN Christian Weaverst, APRN - NP        dextrose 50 % IV solution  12.5 g IntraVENous PRN Christian Weaverst, APRN - NP   12.5 g at 10/05/21 0624    glucagon (rDNA) injection 1 mg  1 mg IntraMUSCular PRN Christian Mendoza, APRN - NP        famotidine (PEPCID) tablet 20 mg  20 mg Oral Daily Edwyna Blood, APRN - NP   20 mg at 10/07/21 0754    magnesium sulfate 1000 mg in dextrose 5% 100 mL IVPB  1,000 mg IntraVENous PRN Edwyna Blood, APRN - NP        sodium phosphate 10 mmol in dextrose 5 % 250 mL IVPB  10 mmol IntraVENous PRN Edwyna Blood, APRN - NP        Or    sodium phosphate 15 mmol in dextrose 5 % 250 mL IVPB  15 mmol IntraVENous PRN Edwyna Blood, APRN - NP   Stopped at 09/23/21 0515    Or    sodium phosphate 20 mmol in dextrose 5 % 500 mL IVPB  20 mmol IntraVENous PRN Edwyna Blood, APRN - NP        aspirin EC tablet 81 mg  81 mg Oral Daily Dallis Postin, APRN - CNP   81 mg at 10/07/21 0754    ferrous sulfate (FE TABS 325) EC tablet 325 mg  325 mg Oral Daily with breakfast Dallis Postin, APRN - CNP   325 mg at 10/07/21 0753    sodium chloride flush 0.9 % injection 5-40 mL  5-40 mL IntraVENous 2 times per day Dallis Postin, APRN - CNP   10 mL at 10/07/21 0754    sodium chloride flush 0.9 % injection 10 mL  10 mL IntraVENous PRN Dallis Postin, APRN - CNP        0.9 % sodium chloride infusion  25 mL IntraVENous PRN Dallis Postin, APRN -  mL/hr at 09/22/21 1822 25 mL at 09/22/21 1822    ondansetron (ZOFRAN-ODT) disintegrating tablet 4 mg  4 mg Oral Q8H PRN Dallis Postin, APRN - CNP        Or    ondansetron [facility]) injection 4 mg  4 mg IntraVENous Q6H PRN Dallis Postin, APRN - CNP        magnesium hydroxide (MILK OF MAGNESIA) 400 MG/5ML suspension 30 mL  30 mL Oral Daily PRN Dallis Postin, APRN - CNP            ALLERGIES:   Allergies   Allergen Reactions    Cephalexin      Gets yeast infection    Codeine         PHYSICAL EXAM:  Vitals:    10/07/21 0404 10/07/21 0500 10/07/21 0600 10/07/21 1130   BP:   104/71    Pulse:  71 82    Resp: 27 (!) 33 23 28   Temp:   98.2 °F (36.8 °C)    TempSrc:   Oral    SpO2:  98% 98% 93%   Weight:   251 lb 5.2 oz (114 kg)    Height:          GENERAL: well developed and well nourished and on HFNC  HEAD: normocephalic and atraumatic  EYES: deferred  EXTERNAL EARS: normal  EAR EXAM: right TM normal landmarks and mobility and left TM diminished mobility and jeremy in color  NOSE: nares patent, normal mucosa  MOUTH/THROAT: deferred  NECK: non-tender, full range of motion, no mass, no focal lymphadenopathy  RESPIRATORY: deferred  NEUROLOGICAL:  cranial nerves II-XII are grossly intact        IMPRESSION:  Left serous otitis media/ acute otitis media  Likely secondary to recent COVID with Eustachian tube dysfunction from being on long term HFNC    RECOMMENDATIONS/PLAN:  Augmentin 875 po BID x 10 days  Follow-up with PCP  Serous OM may take weeks to resolve  If worsens, consider myringotomy    May also follow up with :    OPTION 1:  Ear, Nose, and Throat surgeons at [facility], [address] [place] [address].     [facility]:  Dr. Tracey Miller, Dr. Rubin Weber, Dr. Krystal HSU. [address] [facility]  [phone]    OPTION 2:  [facility]  [address]  [place], [address] [facility]  Appointment scheduling:  [phone]    Other Useful Numbers:   [facility] ENT Nurse triage line     [phone]  (ENT-related questions or concerns, 8am-4pm, Monday through Friday)      --------------------------------------------------  MD Amrita Franco [facility]  Office  ph# [phone]    Also available in [facility]

## 2021-10-07 NOTE — CARE COORDINATION
Spoke to Dara at [facility]. They have insurance approval until tomorrow.  There is not a bed available today but hopefully tomorrow they can accept

## 2021-10-07 NOTE — PROGRESS NOTES
PULMONARY & CRITICAL CARE MEDICINE PROGRESS NOTE     Patient:  Manjinder Gaemz  MRN: [id number]  Admit date: 9/21/2021  Primary Care Physician: Avery Barnett  Consulting Physician: Lauro Molina DO  CODE Status: Full Code  LOS: 11     SUBJECTIVE     CHIEF COMPLAINT/REASON FOR INITIAL CONSULT:   Acute respiratory failure/COVID-19 pneumonia    BRIEF HOSPITAL COURSE:   The patient is a 45 y.o. female history of diabetes mellitus, hypertension, morbid obesity  unvaccinated was admitted with shortness of breath and diagnosed with COVID-19 pneumonia. She apparently was diagnosed about 4 days ago. There was associated cough that is mostly dry and also having some diarrhea. Patient required increasing oxygen with initial oxygen saturation being in the 80s. No previous history of lung disease  Patient is a non-smoker    INTERVAL HISTORY:  10/7/2021   Seen by ent for serious otitis media   Seen by ortho for rt knee swelling   afebrile   High flow 40 l with 90 %   REVIEW OF SYSTEMS:  Review of Systems   Constitutional: Positive for fatigue. Negative for appetite change and fever. HENT: Negative for postnasal drip, rhinorrhea, sore throat, trouble swallowing and voice change. Eyes: Negative for redness and visual disturbance. Respiratory: Positive for cough and shortness of breath. Negative for wheezing. Cardiovascular: Negative for chest pain, palpitations and leg swelling. Gastrointestinal: Negative for abdominal pain, constipation, diarrhea, nausea and vomiting. Endocrine: Negative for polyuria. Genitourinary: Negative for dysuria, frequency, hematuria and urgency. Musculoskeletal: Negative. Allergic/Immunologic: Negative. Neurological: Negative for dizziness, syncope, speech difficulty and headaches. Hematological: Negative for adenopathy. Does not bruise/bleed easily. Psychiatric/Behavioral: Negative.     pulm  OBJECTIVE     PaO2/FiO2 RATIO:  No results for input(s): POCPO2 in the last 72 hours. FiO2 : 80 %     VITAL SIGNS:   LAST:  /81   Pulse 88   Temp 97.5 °F (36.4 °C) (Axillary)   Resp 26   Ht 5' 3\" (1.6 m)   Wt 254 lb 3.1 oz (115.3 kg)   SpO2 95%   BMI 45.03 kg/m²   8-24 HR RANGE:  TEMP Temp  Av.8 °F (36.6 °C)  Min: 97.3 °F (36.3 °C)  Max: 98.1 °F (33.8 °C)   BP Systolic (05QHE), CIL:675 , Min:99 , IPF:146      Diastolic (39RYI), RJI:98, Min:63, Max:81     PULSE Pulse  Av.9  Min: 64  Max: 103   RR Resp  Av.2  Min: 23  Max: 30   O2 SAT SpO2  Av.8 %  Min: 93 %  Max: 97 %   OXYGEN DELIVERY O2 Flow Rate (L/min)  Av.5 L/min  Min: 50 L/min  Max: 60 L/min        SYSTEMIC EXAMINATION:   General appearance - Ill-appearing, mild to moderate respiratory distress  Mental status - awake & alert, follows commands  Eyes - pupils equal and reactive, sclera anicteric  Mouth - mucous membranes moist, pharynx normal without lesions. Large tongue Mallampati 2  Neck -Short thick neck supple, no significant adenopathy, carotids upstroke normal bilaterally, no bruits  Chest - There is no intercostal recession or use of accessory muscles. Breath sounds bilaterally were dimnished to auscultation at bases. There were mild crackles present at bases. No expiratory wheezing and no rhonchi  Heart - normal rate, regular rhythm, normal S1, S2, no murmurs, rubs, clicks or gallops  Abdomen - soft, nontender, nondistended, no masses or organomegaly  Neurological - non-focal  Extremities - peripheral pulses normal, mild bilateral pedal edema, no clubbing or cyanosis.   No calf tenderness  Skin - normal coloration and turgor, no rashes, no suspicious skin lesions noted     DATA REVIEW     Medications: Current Inpatient  Scheduled Meds:   dexamethasone  10 mg IntraVENous Daily    furosemide  20 mg IntraVENous Daily    insulin glargine  50 Units SubCUTAneous BID    vitamin C  500 mg Oral BID    fluticasone  1 spray Each Nostril Daily    cetirizine  10 mg Oral Daily    insulin lispro  0-18 Units SubCUTAneous TID     insulin lispro  0-9 Units SubCUTAneous Nightly    famotidine  20 mg Oral Daily    aspirin  81 mg Oral Daily    ferrous sulfate  325 mg Oral Daily with breakfast    sodium chloride flush  5-40 mL IntraVENous 2 times per day    enoxaparin  30 mg SubCUTAneous BID    Vitamin D  2,000 Units Oral Daily     Continuous Infusions:   dextrose      sodium chloride 25 mL (09/22/21 1822)       INPUT/OUTPUT:  In: 640 [P.O.:640]  Out: 1900 [Urine:1900]  Date 10/02/21 0000 - 10/02/21 2359   Shift 6654-3398 1648-8040 6064-3689 24 Hour Total   INTAKE   Shift Total(mL/kg)       OUTPUT   Urine(mL/kg/hr)  1100  1100   Shift Total(mL/kg)  1100(9.5)  1100(9.5)   Weight (kg) 115.3 115.3 115.3 115.3        LABS:  ABGs:   No results for input(s): POCPH, POCPCO2, POCPO2, POCHCO3, EPXM5QOF in the last 72 hours. CBC:   Recent Labs     09/30/21  0507 10/02/21  0547   WBC 10.8 11.9*   HGB 15.2* 15.7*   HCT 45.1 45.7   MCV 87.4 85.4    216   LYMPHOPCT 10* 12*   RBC 5.16* 5.35*   MCH 29.5 29.3   MCHC 33.7 34.4   RDW 13.2 13.3     CRP:   Recent Labs     09/30/21  0507   CRP <3.0     LDH:   No results for input(s): LDH in the last 72 hours. BMP:   Recent Labs     09/30/21  0507 10/02/21  0547    138   K 4.0 4.1    97*   CO2 24 28   BUN 30* 26*   CREATININE 0.56 0.75   GLUCOSE 194* 194*     Liver Function Test:   No results for input(s): PROT, LABALBU, ALT, AST, GGT, ALKPHOS, BILITOT in the last 72 hours. Coagulation Profile:   No results for input(s): INR, PROTIME, APTT in the last 72 hours. D-Dimer:  No results for input(s): DDIMER in the last 72 hours. Ferritin:    No results for input(s): FERRITIN in the last 72 hours. Lactic Acid:  No results for input(s): LACTA in the last 72 hours. Cardiac Enzymes:  No results for input(s): CKTOTAL, CKMB, CKMBINDEX, TROPONINI in the last 72 hours.     Invalid input(s): TROPONIN, HSTROP  BNP/ProBNP:   No results for input(s): BNP, PROBNP in the last 72 hours. Triglycerides:  No results for input(s): TRIG in the last 72 hours. Microbiology:  Urine Culture:  No components found for: CURINE  Blood Culture:  No components found for: CBLOOD, CFUNGUSBL  Sputum Culture:  No components found for: CSPUTUM  No results for input(s): SPECDESC, SPECIAL, CULTURE, STATUS, ORG, CDIFFTOXPCR, CAMPYLOBPCR, SALMONELLAPC, SHIGAPCR, SHIGELLAPCR, MPNEUG, MPNEUM, LACTOQL in the last 72 hours. No results for input(s): SPUTUM, SPECDESC, SPECIAL, CULTURE, STATUS, ORG, CDIFFTOXPCR, MPNEUM, MPNEUG in the last 72 hours. Invalid input(s): CURINE, CBLOOD, CFUNGUSBL     Pathology:    Radiology Reports:  XR CHEST PORTABLE   Final Result   Limited exam, findings suggest progression in extensive bilateral coarse   alveolar infiltrates suggesting progression in multifocal pneumonia in COVID   positive patient         XR CHEST (SINGLE VIEW FRONTAL)   Final Result   Bilateral diffuse pulmonary edema and or pneumonia without definite effusion. No cardiomegaly was identified. Slight improvement bilaterally has occurred from 09/21/2021. XR CHEST PORTABLE   Final Result   Multifocal airspace opacities worrisome multifocal atypical viral pneumonia. Low lung volumes              Echocardiogram:   No results found for this or any previous visit.        ASSESSMENT AND PLAN     Assessment:    // Acute hypoxic respiratory failure  // Acute respiratory distress syndrome  // Bilateral multifocal pneumonia due to COVID 19 infection  // Diabetes mellitus.  // Morbid obesity  // Likely obstructive sleep apnea/hypoventilation  // Hypertension  // E. coli in urine      Plan:    -High flow nasal cannula during the daytime, bipap at night and prn     -Monitor urine output renal function and electrolytes  - Tested positive:  9/17/21  - Symptom onset:  9/16/21  - Out of Isolation: No  - Vaccinated: No  - Remdesivir: 9/22  - Decadron:  Restarted 9/2 -10 mg daily  - Actemra: 9/22/21  - Co-infection: No. Asymptomatic bacteruria E Coli in urine.  - Fluid Balance: euvolemic  - Glycemic Control: Per primary team.     Discussed with respiratory therapist treatment plan discussed  Discussed with nursing staff. Total critical care time caring for this patient with life threatening, unstable organ failure, including direct patient contact, management of life support systems, review of data including imaging and labs, discussions with other team members and physicians at least 27  Min so far today, excluding procedures. Polly Britt MD.  Pulmonary/critical care attending  [facility]; [place], [place]  10/7/2021, 6:15 PM      This patient was evaluated in the context of the global SARS-CoV-2 (COVID-19) pandemic, which necessitated considerations that the patient either has COVID-19 infection or is at risk of infection with COVID-19. Institutional protocols and algorithms that pertain to the evaluation & management of patients with COVID-19 or those at risk for COVID-19 are in a state of rapid changes based on information released by regulatory bodies including the CDC and federal and state organizations. These policies and algorithms were followed during the patient's care. Please note that this chart was generated using voice recognition Dragon dictation software. Although every effort was made to ensure the accuracy of this automated transcription, some errors in transcription may have occurred.

## 2021-10-07 NOTE — PROGRESS NOTES
[facility] -   Infectious diseases evaluation  admission date 9/21/2021    reason for consultation:   COVID    Impression :   Current:    Covid, pneumonia  Increase inflammatory markers    ·   Discussion / summary of stay / plan of care   ·   Recommendations   · Took a dose of Actemra 9/22  · protocol 10 mg Decadron till 10/6  · Anticoagulation  · She responded by CRP, started to respond clinically slowly       Remove isolation 10/5  Infection Control Recommendations   · [place] Precautions      Sick since 9/13  Stop isolation 10/5    Antimicrobial Stewardship Recommendations   · Of antibiotics    Coordination ofOutpatient Care:   · Estimated Length of IV antimicrobials:  · Patient will need Midline / picc Catheter Insertion:   · Patient will need SNF:  · Patient will need outpatient wound care:     History of Present Illness:   Initial history:  Kailey Stephens is a 45y.o.-year-old female     Interval changes  10/7/2021   Patient Vitals for the past 8 hrs:   BP Temp Temp src Pulse Resp SpO2 Weight   10/07/21 1130     28 93 %    10/07/21 0600 104/71 98.2 °F (36.8 °C) Oral 82 23 98 % 251 lb 5.2 oz (114 kg)   10/07/21 0500    71 (!) 33 98 %    10/07/21 0404     27     10/07/21 0400 100/66 98.1 °F (36.7 °C) Oral           Poor turnaround overnight and she is now on 80% high flow alternating with 100%. She is eating she has no fever, she had taken Actemra, a little depressed, was encouraged on the bedside    9/29: Labs within range, chest x-ray showing progression, the patient is on higher oxygenation, 90% BiPAP and saturating 90% only. Abdomen soft nontender otherwise. Alert    -128-47    9/30:  CRP < 3 -WBC 10  Patient feels better, is on high flow 80%, saturation 88-92% and depends on if she is at rest or moving evaluate.   No new positive cultures, trying to wean off oxygen slowly    10/5  On high flow 86% oxygen, patient saturating 92%.  Alert appropriate, anxious to get better, improving slowly. Encourage    10/6  Isolation removed today, patient is on high flow FiO2 82%, similar to before. She looks good feels better though. Eating better, limited mobility due to desaturation. 10/7  Remains without fever- CRP pending today  Chest x-ray shows same infiltrates    -128-47 - 10  W11  Isolation removed  75% FiO2 high flow, chest x-ray same infiltrates        Allergies:   Cephalexin and Codeine     Review of Systems:     Review of Systems   Constitutional: Negative for activity change and diaphoresis. HENT: Negative for congestion. Eyes: Negative for pain and redness. Respiratory: Positive for shortness of breath. Negative for apnea and chest tightness. Cardiovascular: Negative for chest pain. Gastrointestinal: Negative for abdominal distention. Endocrine: Negative for heat intolerance. Genitourinary: Negative for dysuria, flank pain and frequency. Musculoskeletal: Negative for arthralgias. Skin: Negative for color change. Allergic/Immunologic: Negative for immunocompromised state. Neurological: Negative for dizziness, light-headedness and numbness. Hematological: Negative for adenopathy. Psychiatric/Behavioral: Negative for agitation. Physical Examination :       Physical Exam  Constitutional:       General: She is not in acute distress. Appearance: Normal appearance. She is ill-appearing. She is not toxic-appearing or diaphoretic. HENT:      Head: Normocephalic and atraumatic. Nose: Nose normal.   Eyes:      General: No scleral icterus. Conjunctiva/sclera: Conjunctivae normal.      Pupils: Pupils are equal, round, and reactive to light. Cardiovascular:      Rate and Rhythm: Normal rate and regular rhythm. Heart sounds: No murmur heard. No friction rub. Pulmonary:      Effort: No respiratory distress. Breath sounds: Normal breath sounds.    Abdominal:      General: There is no distension. Palpations: Abdomen is soft. Tenderness: There is no abdominal tenderness. Genitourinary:     Comments: No hernandez  Musculoskeletal:         General: No swelling, tenderness, deformity or signs of injury. Cervical back: Neck supple. No rigidity or tenderness. Right lower leg: No edema. Skin:     General: Skin is dry. Coloration: Skin is not jaundiced or pale. Findings: No erythema. Neurological:      General: No focal deficit present. Mental Status: She is alert and oriented to person, place, and time. Psychiatric:         Mood and Affect: Mood normal.         Behavior: Behavior normal.         Past Medical History:     Past Medical History:   Diagnosis Date    Anemia     Chronic back pain     [facility]     H/O LEEP     Hypertension     Obesity, morbid, BMI 40.0-49.9 ([facility] 9/28/2021    S/P endometrial ablation        Past Surgical  History:     Past Surgical History:   Procedure Laterality Date    ENDOMETRIAL ABLATION      LEEP         Medications:      celecoxib  50 mg Oral BID    insulin glargine  25 Units SubCUTAneous BID    enoxaparin  40 mg SubCUTAneous Daily    cetirizine  10 mg Oral Daily    insulin lispro  0-18 Units SubCUTAneous TID WC    insulin lispro  0-9 Units SubCUTAneous Nightly    famotidine  20 mg Oral Daily    aspirin  81 mg Oral Daily    ferrous sulfate  325 mg Oral Daily with breakfast    sodium chloride flush  5-40 mL IntraVENous 2 times per day       Social History:     Social History     Socioeconomic History    Marital status: Single     Spouse name: Not on file    Number of children: Not on file    Years of education: Not on file    Highest education level: Not on file   Occupational History    Not on file   Tobacco Use    Smoking status: Never Smoker    Smokeless tobacco: Never Used   Substance and Sexual Activity    Alcohol use: Yes     Comment: occa.     Drug use: No    Sexual activity: Not on file   Other Topics Concern    Not on file   Social History Narrative    Not on file     Social Determinants of Health     Financial Resource Strain:     Difficulty of Paying Living Expenses:    Food Insecurity:     Worried About Running Out of Food in the Last Year:     [address] in the Last Year:    Transportation Needs:     Lack of Transportation (Medical):  Lack of Transportation (Non-Medical):    Physical Activity:     Days of Exercise per Week:     Minutes of Exercise per Session:    Stress:     Feeling of Stress :    Social Connections:     Frequency of Communication with Friends and Family:     Frequency of Social Gatherings with Friends and Family:     Attends [employer] Services:     Active Member of Clubs or Organizations:     Attends Club or Organization Meetings:     Marital Status:    Intimate Partner Violence:     Fear of Current or Ex-Partner:     Emotionally Abused:     Physically Abused:     Sexually Abused:        Family History:     Family History   Problem Relation Age of Onset    Hypotension Mother     Heart Disease Father     Heart Failure Father       Medical Decision Making:   I have independently reviewed/ordered the following labs:    CBC with Differential:   Recent Labs     10/06/21  0328   WBC 11.8*   HGB 15.2*   HCT 44.9      LYMPHOPCT 16*   MONOPCT 6     BMP:  Recent Labs     10/05/21  1623 10/06/21  0328   * 137   K 4.0 3.8   CL 94* 98   CO2 26 28   BUN 34* 31*   CREATININE 0.71 0.54     Hepatic Function Panel:   Recent Labs     10/06/21  0328   PROT 5.5*   LABALBU 3.3*   BILIDIR 0.22   IBILI 0.70   BILITOT 0.92   ALKPHOS 82   ALT 46*   AST 25     No results for input(s): RPR in the last 72 hours. No results for input(s): HIV in the last 72 hours. No results for input(s): BC in the last 72 hours. Lab Results   Component Value Date    CREATININE 0.54 10/06/2021    GLUCOSE 105 10/06/2021       Detailed results:         Thank you for

## 2021-10-07 NOTE — PROGRESS NOTES
Physical Therapy        Physical Therapy Cancel Note      DATE: 10/7/2021    NAME: Silvia Correa  MRN: [id number]   : 1980      Patient not seen this date for Physical Therapy due to:    Patient is not appropriate for PT evaluation/treatment at this time d/t pt having severe pain in RLE, unable to [facility], RN aware.       Electronically signed by Kenny Hernandez PTA on 10/7/2021 at 2:17 PM

## 2021-10-07 NOTE — PROGRESS NOTES
[employer]up[employer]  Occupational Therapy Not Seen Note    DATE: 10/7/2021    NAME: Tawana Pitt  MRN: [id number]   : 1980      Patient not seen this date for Occupational Therapy due to: Pt having severe pain in RLE, unable to [facility], RN aware.        Next Scheduled Treatment: 10/8/21      Electronically signed by NAZANIN Melendez on 10/7/2021 at 2:14 PM

## 2021-10-07 NOTE — CONSULTS
Orthopedic Surgery Consult  (Dr. Alison Boston)    CC/Reason for consult:  Right leg pain    HPI:      The patient is a 45 y.o. female with the above complaint being consulted for further evaluation. Patient reports knee pain that began several days ago without traumatic onset. She has been admitted since 9/22 for COVID pna which is still being managed currently. Patient has been unable to ambulate due to severe desats whenever she gets up. Denies fevers. Denies previous trauma to knee. Denies previous joint infections. Denies pain when at rest. Does report circumferential tenderness about the knee down to the foot. Vitals were reviewed. Patient does not complain of any other orthopedic issues. Past Medical History:    Past Medical History:   Diagnosis Date    Anemia     Chronic back pain     Diabetes mellitus ([facility]     H/O LEEP     Hypertension     [facility] morbid, [facility] 40.0[facility]49.[facility] 9/28/[facility]    S/P endometrial ablation        Past Surgical History:    Past Surgical History:   Procedure Laterality Date    ENDOMETRIAL ABLATION      LEEP         Medications Prior to Admission:   Prior to Admission medications    Medication Sig Start Date End Date Taking?  Authorizing Provider   clobetasol (TEMOVATE) 0.05 % external solution Apply topically 3 times weekly to rash on scalp 6/12/20   Maritza Sarmiento MD   Salicylic Acid 3 % SHAM Use 3-4 times weekly leave on for 5 minutes prior to washing off scalp 6/12/20   Maritza Sarmiento MD   aspirin 81 MG tablet Take 81 mg by mouth daily    Historical Provider, MD   Probiotic Product (PROBIOTIC PO) Take by mouth    Historical Provider, MD   metFORMIN (GLUCOPHAGE) 500 MG tablet Take 500 mg by mouth 7/24/19   Historical Provider, MD   lisinopril (PRINIVIL;ZESTRIL) 2.5 MG tablet Take 2.5 mg by mouth 7/24/19   Historical Provider, MD   ferrous sulfate 325 (65 Fe) MG tablet Take 325 mg by mouth daily (with breakfast)    Historical Provider, MD   Multiple Vitamins-Minerals (WOMENS MULTIVITAMIN PO) Take by mouth    Historical Provider, MD   loperamide (IMODIUM) 2 MG capsule Take 2 mg by mouth 4 times daily as needed for Diarrhea    Historical Provider, MD       Allergies:    Cephalexin and Codeine    Social History:   Social History     Socioeconomic History    Marital status: Single     Spouse name: None    Number of children: None    Years of education: None    Highest education level: None   Occupational History    None   Tobacco Use    Smoking status: Never Smoker    Smokeless tobacco: Never Used   Substance and Sexual Activity    Alcohol use: Yes     Comment: occa.  Drug use: No    Sexual activity: None   Other Topics Concern    None   Social History Narrative    None     Social Determinants of Health     Financial Resource Strain:     Difficulty of Paying Living Expenses:    Food Insecurity:     Worried About Running Out of Food in the Last Year:     [address] in the Last Year:    Transportation Needs:     Lack of Transportation (Medical):  Lack of Transportation (Non-Medical):    Physical Activity:     Days of Exercise per Week:     Minutes of Exercise per Session:    Stress:     Feeling of Stress :    Social Connections:     Frequency of Communication with Friends and Family:     Frequency of Social Gatherings with Friends and Family:     Attends [employer] Services:     Active Member of Clubs or Organizations:     Attends Club or Organization Meetings:     Marital Status:    Intimate Partner Violence:     Fear of Current or Ex-Partner:     Emotionally Abused:     Physically Abused:     Sexually Abused:        Family History:  Family History   Problem Relation Age of Onset    Hypotension Mother     Heart Disease Father     Heart Failure Father        REVIEW OF SYSTEMS:   Constitutional: Negative for fever and chills. Cardiovascular: Negative for chest pain and palpitations.    Musculoskeletal: As described in the HPI.  Skin: Negative for itching and rash. PHYSICAL EXAM:  Blood pressure 104/71, pulse 82, temperature 98.2 °F (36.8 °C), temperature source Oral, resp. rate 28, height 5' 3\" (1.6 m), weight 251 lb 5.2 oz (114 kg), SpO2 93 %, not currently breastfeeding. Gen: -Alert, cooperative      Chest: Non labored breathing. Cardiovascular: Regular rate, no dependent edema, distal pulses 2+    Respiratory: Chest symmetric, no accessory muscle use, normal respirations    RLE: No ecchymoses, abrasions, deformity, or lacerations. Skin intact. Tenderness to palpation about the knee and distal leg/foot diffusely. Compartments soft. EHL/FHL/TA/GS complex motor intact. Sural, saphenous, superificial/deep peroneal, and plantar nerve distribution SILT. Dorsalis pedis/posterior tibial pulses 2+ with BCR. LABS:  Recent Labs     10/06/21  0328 10/07/21  0708   WBC 11.8*  --    HGB 15.2*  --    HCT 44.9  --      --      --    K 3.8  --    BUN 31*  --    CREATININE 0.54  --    GLUCOSE 105*  --    CRP  --  5.9*        Radiology:    XR of knee negative    Impression 45 y.o. female being seen after consultation for the following problems:  1) Right leg swelling/pain    Plan  - CRP 6. No suspicion for septic arthritis. - If there is concern for DVT, recommend ultrasound doppler per primary.   - Weight bearing status: weight bearing as tolerated right lower extremity   - Ice (20 minutes on and off 1 hour) and elevate above the level of the heart  to reduce swelling and throbbing pain.   - F/u Dr. Lorri Jones prn.   - Please page Ortho with any questions or concerns    Chelita Becerra DO  Orthopedic Surgery Resident, PGY-3  [place] [id number], [facility]

## 2021-10-07 NOTE — PROGRESS NOTES
[facility]  Office: [address], DO, Jeremy Bailey, DO, Telly Liz, DO, Taran Lopez, DO, Carlos Dumont MD, Guzman Winston MD, Ginger Reaves MD, Joseline Cooley MD, Joelle Colunga MD, Wes Blackman MD, Lori Davidson MD, Jacqui Kirby DO, Wilfrido Castro DO, Marco Akhtar MD,  Austin Kearns DO, Julianne Jenkins MD, Mercy Enciso MD, Levi Ortiz MD, Jaswant Ceron MD, , Jovi Thornton MD, Krystal Razo MD, Denise Aguilera MD, Juma Celeste, [facility], [facility], CNP, Osvaldo Queen, CNP, Blade Dave, CNS, Ancelmo High, CNP, Claude Leep, CNP, Myra Strong, CNP, Loree Hall, CNP, Ese Mcclendon, CNP, SHILPA ZepedaC, Louise Martinez, [facility], Rosibel Montelongo, CNP, Floresita Delacruz, CNP, Fozia Brock, CNP, Christian Vera, CNP, Georgina Briscoe, CNP, Seven Mohan, CNP, Kameron Rogel, [facility], Librado Perrin, [address] [place] [address]    Progress Note    10/7/2021    10:11 AM    Name:   Zohaib Gudino  MRN:     [id number]     Acct:      [de-identified]   Room:   [address] [place] [address] Day:  12  Admit Date:  9/21/2021  8:43 PM    PCP:   Flako De Los Santos  Code Status:  Full Code    Subjective:     C/C:   Chief Complaint   Patient presents with   Marquita Vivar Other     COVID+    Shortness of Breath     Interval History Status: Improved    Patient seen and examined today, complained of right knee pain and left ear pain today. Right knee was swollen started last night, no fevers, no other joint pain. Left ear has been persistently worsening last few days, having difficulty with hearing now. Discussed having orthopedic surgery and ENT see her today. Brief History:     Zohaib Gudino is a 45 y.o.  Non- / non  female who presents with Other (COVID+) and Shortness of Breath   and is admitted to the hospital for the management of Covid 19 Pneumonia      Patient presented to the emergency room for the concern of worsening COVID-19 Symptoms. Patient was originally seen Patient was originally diagnosed with the COVID 4 days prior. Patient states since diagnosis she has been having increased shortness of breath, cough  With water-like diarrhea. Upon arrival to the emergency room patient was noted to be hypoxic with saturations in the 80's  she required 6 lpm nc supplemental oxygen. Was seen by infectious disease, started on high-dose steroids, Actemra was given on 9/22/2021. Patient however continue to require more oxygen. She has been feeling fine for the past couple days but still requires 90% FiO2 to maintain oxygen of 91 to 90%. She still feels very winded with ambulation.      Did not receive the vaccine because she felt it was \"rushed. \"    - Uptrendning oxygen requirements prompting transfer to [facility] ICU overnight on 9/27-9/28.    - Slowly weaning. Likely LTAC. - Worsening FiO2 on 10/3-10/4. Review of Systems:     Constitutional:  negative for chills, fevers, sweats, + fatigue  HEENT: reports intermittent left ear fullness. Respiratory:  +cough, +dyspnea on exertion, + shortness of breath,  No wheezing  Cardiovascular:  negative for chest pain, chest pressure/discomfort, lower extremity edema, palpitations  Gastrointestinal:  negative for abdominal pain, constipation, diarrhea, nausea, vomiting  Neurological:  negative for dizziness, headache  Psych: Reports anxiety    Medications: Allergies:     Allergies   Allergen Reactions    Cephalexin      Gets yeast infection    Codeine        Current Meds:   Scheduled Meds:    insulin glargine  25 Units SubCUTAneous BID    enoxaparin  40 mg SubCUTAneous Daily    cetirizine  10 mg Oral Daily    insulin lispro  0-18 Units SubCUTAneous TID WC    insulin lispro  0-9 Units SubCUTAneous Nightly    famotidine  20 mg Oral Daily    aspirin  81 mg Oral Daily    ferrous sulfate  325 mg Oral Daily with breakfast    sodium chloride flush  5-40 mL IntraVENous 2 times per day Continuous Infusions:    dextrose      sodium chloride 25 mL (21 1822)     PRN Meds: traMADol, dextromethorphan-guaiFENesin, albuterol sulfate HFA **AND** ipratropium, glucose, glucagon (rDNA), dextrose, ALPRAZolam, potassium chloride **OR** potassium alternative oral replacement **OR** potassium chloride, glucose, dextrose, glucagon (rDNA), magnesium sulfate, sodium phosphate IVPB **OR** sodium phosphate IVPB **OR** sodium phosphate IVPB, sodium chloride flush, sodium chloride, ondansetron **OR** ondansetron, magnesium hydroxide    Data:     Past Medical History:   has a past medical history of Anemia, Chronic back pain, Diabetes mellitus ([facility] H/O LEEP, Hypertension, Obesity, morbid, [facility] 40.0-49.9 [facility] and S/P endometrial ablation. Social History:   reports that she has never smoked. She has never used smokeless tobacco. She reports current alcohol use. She reports that she does not use drugs. Family History:   Family History   Problem Relation Age of Onset    Hypotension Mother     Heart Disease Father     Heart Failure Father        Vitals:  /71   Pulse 82   Temp 98.2 °F (36.8 °C) (Oral)   Resp 23   Ht 5' 3\" (1.6 m)   Wt 251 lb 5.2 oz (114 kg)   SpO2 98%   BMI 44.52 kg/m²   Temp (24hrs), Av.1 °F (36.7 °C), Min:97.5 °F (36.4 °C), Max:98.3 °F (36.8 °C)    Recent Labs     10/06/21  1112 10/06/21  1559 10/06/21  2101 10/07/21  0631   POCGLU 222* 351* 314* 133*       I/O (24Hr):     Intake/Output Summary (Last 24 hours) at 10/7/2021 1011  Last data filed at 10/7/2021 0617  Gross per 24 hour   Intake 1635 ml   Output 4250 ml   Net -2615 ml       Labs:  Hematology:  Recent Labs     10/04/21  1619 10/06/21  0328   WBC  --  11.8*   RBC  --  5.11   HGB  --  15.2*   HCT  --  44.9   MCV  --  87.9   MCH  --  29.7   MCHC  --  33.9   RDW  --  13.4   PLT  --  194   MPV  --  11.7   DDIMER 0.56  --      Chemistry:  Recent Labs     10/05/21  1623 10/06/21  0328   * 137   K 4.0 3.8 CL 94* 98   CO2 26 28   GLUCOSE 337* 105*   BUN 34* 31*   CREATININE 0.71 0.54   ANIONGAP 14 11   LABGLOM >60 >60   GFRAA >60 >60   CALCIUM 9.1 8.8     Recent Labs     10/05/21  1848 10/06/21  0328 10/06/21  0747 10/06/21  1112 10/06/21  1559 10/06/21  2101 10/07/21  0631   PROT  --  5.5*  --   --   --   --   --    LABALBU  --  3.3*  --   --   --   --   --    AST  --  25  --   --   --   --   --    ALT  --  46*  --   --   --   --   --    ALKPHOS  --  82  --   --   --   --   --    BILITOT  --  0.92  --   --   --   --   --    BILIDIR  --  0.22  --   --   --   --   --    POCGLU 339*  --  57* 222* 351* 314* 133*         Radiology:  XR CHEST PORTABLE    Result Date: 9/21/2021  Multifocal airspace opacities worrisome multifocal atypical viral pneumonia. Low lung volumes       Physical Examination:        General appearance:  alert, cooperative, ill-appearing, obese  female sitting up in bed. Mental Status:  oriented to person, place and time and normal affect  HEENT: BIPAP present, EOMI, sclera are anti-icteric  Lungs:  Tachypneic, diminished posteriorly, low inspiratory effort, deep breathing induces a couh  Heart:  regular rate and rhythm, no murmur  Abdomen: protuberant, soft, nontender, nondistended, normal bowel sounds, no masses, hepatomegaly, splenomegaly  Extremities:  no edema, redness, tenderness in the calves  Skin:  no gross lesions, rashes, induration    Assessment:        Hospital Problems         Last Modified POA    * (Principal) Pneumonia due to COVID-19 virus 9/28/2021 Yes    [facility] 9/28/2021 Yes    Obesity, morbid, BMI 40.0-49.9 ([facility] 9/28/2021 Yes    Essential hypertension 9/28/2021 Yes    Elevated C-reactive protein (CRP) 9/28/2021 Yes    COVID-19 9/30/2021 Yes    Type 2 diabetes mellitus with hyperglycemia, with long-term current use of insulin ([facility] 9/28/2021 Yes          Plan:        COVID-19 viral pneumonia:   - Infectious disease following  -Completed Decadron therapy high-dose  - Actemra received 9/22  -Out of isolation 10-5-2021    Acute hypoxic respiratory failure:   - On high flow nasal cannula. - Pulmonology following  - Continue combivent scheduled    Sepsis from covid: Secondary to #1    Left ear painevaluated at bedside, noted some swelling and discoloration. Still complaining of hearing loss, will consult ENT for evaluation    Right Knee pain - ortho eval    Left ear fullness - continue flonase    Essential hypertension: Currently stable off of meds. Diabetes mellitus type 2:   -  decrease lantus to 25 units BID    Morbid obesity (BMI 45.69): Weight loss encouraged    GI/DVT prophylaxis: Pepcid, Lovenox twice daily    Disposition: Continue Lantus at 25 units today, patient was hyperglycemic yesterday but patient now is off Decadron. Suspect her insulin requirements will slowly decrease next few days.   Will consult orthopedic surgery and ENT as above    Bobbi Lin DO  10/7/2021  10:11 AM

## 2021-10-07 NOTE — PLAN OF CARE
Problem: Airway Clearance - Ineffective  Goal: Achieve or maintain patent airway  10/7/2021 0854 by Elina Moran RCP  Outcome: Ongoing     Problem: Gas Exchange - Impaired  Goal: Absence of hypoxia  10/7/2021 0854 by Elina Moran RCP  Outcome: Ongoing     Problem: Gas Exchange - Impaired  Goal: Promote optimal lung function  10/7/2021 0854 by Elina Moran RCP  Outcome: Ongoing     Problem: Breathing Pattern - Ineffective  Goal: Ability to achieve and maintain a regular respiratory rate  10/7/2021 0854 by Elina Moran RCP  Outcome: Ongoing     Problem: OXYGENATION/RESPIRATORY FUNCTION  Goal: Patient will achieve/maintain normal respiratory rate/effort  Description: Respiratory rate and effort will be within normal limits for the patient  10/7/2021 0854 by Elina Moran RCP  Outcome: Ongoing    PROVIDE ADEQUATE OXYGENATION WITH ACCEPTABLE SP02/ABG'S    [x]  IDENTIFY APPROPRIATE OXYGEN THERAPY  [x]   MONITOR SP02/ABG'S AS NEEDED   [x]   PATIENT EDUCATION AS NEEDED

## 2021-10-08 NOTE — PROGRESS NOTES
Orthopedic Progress Note    Patient:  Mikael Menendez  YOB: 1980     45 y.o. female    Subjective:  Patient seen and examined. Pain slightly worsened compared to yesterday, per patient. Asked to reevaluate by IM team due to worsening pain and increased CRP. Denies fever / chills. Endorses entire leg pain. Vitals reviewed, afebrile    Objective:   Vitals:    10/08/21 1300   BP: 97/74   Pulse: 116   Resp: (!) 40   Temp:    SpO2: 99%     General: NAD, cooperative   Cardiovascular: Regular rate   Respiratory: On BiPaP  Musculoskeletal  RLE: None to minimal effusion compared to contralateral knee. No erythema. No obvious cellulitis. From knee to midcalf, the leg is slightly warmer compared to contralateral extremity. Patient tolerates 0-140 degree ROM with minimal pain. EHL/FHL/TA/GS complex motor intact. Sural, saphenous, superificial/deep peroneal, and plantar nerve distribution SILT. Dorsalis pedis pulse 2+ with BCR. Recent Labs     10/08/21  1004   WBC 9.6   HGB 14.2   HCT 41.6         K 3.7   BUN 18   CREATININE 0.60   GLUCOSE 196*      Meds: ceftaroline.   See rec for complete list    Impression 45 y.o. female who is being seen for    -Right leg swelling/pain    Plan  - Very low concern for septic knee arthritis from clinical picture  - If patient is unable to tolerate >10 degree ROM or if knee becomes erythematous/septic appearing, please contact ortho  - ID believes rising CRP related to re-development of pneumonia  - CRP 6 --> 148  - WB status: weight bear as tolerated right lower extremity   - Encouraged ROM exercises in bed, pain may be from positioning  - Ice for 20 minutes an hour and keep elevated to reduce swelling and throbbing pain  - Encourage PT/OT participation  - F/u with Dr. Suyapa Sanford PRN  - Please page ortho with any questions    Electronically signed by Melba Price MD at 2:14 PM 10/08/21

## 2021-10-08 NOTE — FLOWSHEET NOTE
Advance Care Planning     Advance Care Planning Inpatient Note  Spiritual Care Department    Today's Date: 10/8/2021  Unit: [facility]    Received request from [employer]. Upon review of chart and communication with care team, patient's decision making abilities are not in question. . Patient was/were present in the room during visit. Goals of ACP Conversation:  Discuss advance care planning documents    Health Care Decision Makers:       Primary Decision Maker: Enmanuel Bailey - Brother/Sister - [phone]    Secondary Decision Maker: Sierra Tang - Parent - [phone]    Summary:  Completed New Documents    Advance Care Planning Documents (Patient Wishes):  Healthcare Power of /Advance Directive Appointment of Health Care Agent     Assessment:  Patient appeared nervous as she engaged in conversation. She said that she wanted to complete POA-H paperwork with her local sister as primary agent and her mother as alternate. Interventions:  Provided education on documents for clarity and greater understanding  Assisted in the completion of documents according to patient's wishes at this time  Encouraged ongoing ACP conversation with future decision makers and loved ones   Emotional support    Care Preferences Communicated:   No    Outcomes/Plan:  New advance directive completed. Returned original document(s) to patient, as well as copies for distribution to appointed agents  Copy of advance directive given to staff to scan into medical record.     Electronically signed by Desean [facility] on 10/8/2021 at 3:19 PM             10/08/21 1500   Encounter Summary   Services provided to: Patient   Referral/Consult From: Nurse   Continue Visiting   (10/8/2021)   Complexity of Encounter Moderate   Length of Encounter 45 minutes   Spiritual Assessment Completed Yes   Advance Care Planning Yes   Routine   Type Follow up   Assessment Approachable   Intervention Sustaining presence/ Ministry of presence; Explored feelings, thoughts, concerns   Outcome Coping

## 2021-10-08 NOTE — PROGRESS NOTES
[facility]  Office: [address], DO, Iraj Shook, DO, Fabien Blankenship, DO, Mariya Jocelyne Blood, DO, Yu Bojorquez MD, Rochelle Grant MD, Jw Coppola MD, Milka Gomez MD, Nelli Rai MD, Rebekah Howard MD, Gio Chowdhury MD, Isaac Vieira, DO, Media Sensor, DO, Renata Cummings MD,  Amarilis Salas, DO, Shania Lopez MD, Fabiola Lang MD, Robi Miller MD, Ruthie Molina MD, , Miah Pedro MD, Calin Bangura MD, Dorie Jennings MD, Nikita To, CNP, [facility], CNP, Renata Rao, CNP, Patricia Sanchez, CNS, Tyra Auguste, CNP, Laura Villegas, CNP, Aidan Pineda, CNP, Merlinda Goring, CNP, Vicky Perez, CNP, Wilbert Pham PA-C, Patricia Flynn, [facility], Les Ken, CNP, Naheed Huggins, CNP, Ramo Smiley, CNP, Elaine Joseph, CNP, Clemente Pollard, CNP, Quincy Huitron, CNP, Gema Fan, CNP, Amilcar Shah, [address] [place] [address]    Progress Note    10/8/2021    12:41 PM    Name:   Kemal Walker  MRN:     [id number]     Acct:      [de-identified]   Room:   [address] [place] [address] Day:  16  Admit Date:  9/21/2021  8:43 PM    PCP:   Hu Lim  Code Status:  Full Code    Subjective:     C/C:   Chief Complaint   Patient presents with   Ardyth Moritz Other     COVID+    Shortness of Breath     Interval History Status: Improved    Seen and examined today, extensive conversation with infectious disease and orthopedic surgery nursing. Patient has worsening knee pain, has had new spot on the left knee as well that is raised, painful and warm. Discussed ordering a CT PE protocol for rule out pulmonary embolism, will also order blood cultures and repeat labs to work-up suspected infectious etiology. Patient is status post Actemra which was discussed with orthopedic surgery. Brief History:     Thurnell Fitch is a 45 y.o.  Non- / non  female who presents with Other (COVID+) and Shortness of Breath   and is admitted to the hospital for the management of Covid 19 Pneumonia      Patient presented to the emergency room for the concern of worsening COVID-19 Symptoms. Patient was originally seen Patient was originally diagnosed with the COVID 4 days prior. Patient states since diagnosis she has been having increased shortness of breath, cough  With water-like diarrhea. Upon arrival to the emergency room patient was noted to be hypoxic with saturations in the 80's  she required 6 lpm nc supplemental oxygen. Was seen by infectious disease, started on high-dose steroids, Actemra was given on 9/22/2021. Patient however continue to require more oxygen. She has been feeling fine for the past couple days but still requires 90% FiO2 to maintain oxygen of 91 to 90%. She still feels very winded with ambulation.      Did not receive the vaccine because she felt it was \"rushed. \"    - Uptrendning oxygen requirements prompting transfer to [facility] ICU overnight on 9/27-9/28.    - Slowly weaning. Likely LTAC. - Worsening FiO2 on 10/3-10/4. Review of Systems:     Constitutional:  negative for chills, fevers, sweats, + fatigue  HEENT: reports intermittent left ear fullness. Respiratory:  +cough, +dyspnea on exertion, + shortness of breath,  No wheezing  Cardiovascular:  negative for chest pain, chest pressure/discomfort, lower extremity edema, palpitations  Gastrointestinal:  negative for abdominal pain, constipation, diarrhea, nausea, vomiting  Neurological:  negative for dizziness, headache  Psych: Reports anxiety    Medications: Allergies:     Allergies   Allergen Reactions    Cephalexin      Gets yeast infection    Codeine Hives       Current Meds:   Scheduled Meds:    busPIRone  10 mg Oral TID    furosemide  20 mg IntraVENous Daily    potassium chloride  20 mEq Oral Once    fentanNYL  50 mcg IntraVENous Once    celecoxib  50 mg Oral BID    amoxicillin-clavulanate  1 tablet Oral 2 times per day    insulin glargine  25 Units SubCUTAneous BID    enoxaparin  40 mg SubCUTAneous Daily    cetirizine  10 mg Oral Daily    insulin lispro  0-18 Units SubCUTAneous TID WC    insulin lispro  0-9 Units SubCUTAneous Nightly    famotidine  20 mg Oral Daily    aspirin  81 mg Oral Daily    ferrous sulfate  325 mg Oral Daily with breakfast    sodium chloride flush  5-40 mL IntraVENous 2 times per day     Continuous Infusions:    dextrose      sodium chloride 25 mL (21 1822)     PRN Meds: traMADol, dextromethorphan-guaiFENesin, albuterol sulfate HFA **AND** ipratropium, glucose, glucagon (rDNA), dextrose, ALPRAZolam, potassium chloride **OR** potassium alternative oral replacement **OR** potassium chloride, glucose, dextrose, glucagon (rDNA), magnesium sulfate, sodium phosphate IVPB **OR** sodium phosphate IVPB **OR** sodium phosphate IVPB, sodium chloride flush, sodium chloride, ondansetron **OR** ondansetron, magnesium hydroxide    Data:     Past Medical History:   has a past medical history of Anemia, Chronic back pain, Diabetes mellitus ([facility] H/O LEEP, Hypertension, Obesity, morbid, BMI 40.0-49.9 [facility] and S/P endometrial ablation. Social History:   reports that she has never smoked. She has never used smokeless tobacco. She reports current alcohol use. She reports that she does not use drugs. Family History:   Family History   Problem Relation Age of Onset    Hypotension Mother     Heart Disease Father     Heart Failure Father        Vitals:  /81   Pulse 117   Temp 98.2 °F (36.8 °C) (Oral)   Resp (!) 37   Ht 5' 3\" (1.6 m)   Wt 251 lb 5.2 oz (114 kg)   SpO2 90%   BMI 44.52 kg/m²   Temp (24hrs), Av.1 °F (36.7 °C), Min:97.5 °F (36.4 °C), Max:98.7 °F (37.1 °C)    Recent Labs     10/07/21  1609 10/07/21  1957 10/08/21  0657 10/08/21  1125   POCGLU 111* 156* 80 117*       I/O (24Hr):     Intake/Output Summary (Last 24 hours) at 10/8/2021 1241  Last data filed at 10/8/2021 0435  Gross per 24 hour   Intake 450 ml Output 900 ml   Net -450 ml       Labs:  Hematology:  Recent Labs     10/06/21  0328 10/07/21  0708 10/08/21  1004   WBC 11.8*  --  9.6   RBC 5.11  --  4.70   HGB 15.2*  --  14.2   HCT 44.9  --  41.6   MCV 87.9  --  88.5   MCH 29.7  --  30.2   MCHC 33.9  --  34.1   RDW 13.4  --  13.9     --  152   MPV 11.7  --  11.5   CRP  --  5.9* 143.9*     Chemistry:  Recent Labs     10/05/21  1623 10/06/21  0328 10/08/21  1004   * 137 138   K 4.0 3.8 3.7   CL 94* 98 100   CO2 26 28 24   GLUCOSE 337* 105* 196*   BUN 34* 31* 18   CREATININE 0.71 0.54 0.60   MG  --   --  1.8   ANIONGAP 14 11 14   LABGLOM >60 >60 >60   GFRAA >60 >60 >60   CALCIUM 9.1 8.8 8.8   PHOS  --   --  4.0     Recent Labs     10/06/21  0328 10/06/21  0747 10/07/21  0631 10/07/21  1034 10/07/21  1609 10/07/21  1957 10/08/21  0657 10/08/21  1004 10/08/21  1125   PROT 5.5*  --   --   --   --   --   --  5.6*  --    LABALBU 3.3*  --   --   --   --   --   --  3.0*  3.0*  --    AST 25  --   --   --   --   --   --  38*  --    ALT 46*  --   --   --   --   --   --  53*  --    ALKPHOS 82  --   --   --   --   --   --  108*  --    BILITOT 0.92  --   --   --   --   --   --  1.18  --    BILIDIR 0.22  --   --   --   --   --   --  0.30  --    POCGLU  --    < > 133* 142* 111* 156* 80  --  117*    < > = values in this interval not displayed. Radiology:  XR CHEST PORTABLE    Result Date: 9/21/2021  Multifocal airspace opacities worrisome multifocal atypical viral pneumonia. Low lung volumes       Physical Examination:        General appearance:  alert, cooperative, ill-appearing, obese  female sitting up in bed. Mental Status:  oriented to person, place and time and normal affect  HEENT: BIPAP present, EOMI, sclera are anti-icteric  Lungs:  Tachypneic, diminished posteriorly, low inspiratory effort, deep breathing induces a couh  Heart:  regular rate and rhythm, no murmur  Abdomen: protuberant, soft, nontender, nondistended, normal bowel sounds, no masses, hepatomegaly, splenomegaly  Extremities:  no edema, redness, tenderness in the calves  Skin:  no gross lesions, rashes, induration    Assessment:        Hospital Problems         Last Modified POA    * (Principal) Pneumonia due to COVID-19 virus 9/28/2021 Yes    Acute respiratory failure with hypoxia ([facility] [address] 9/28/2021 Yes    Obesity, morbid, BMI 40.0-49.9 ([facility] 9/28/2021 Yes    Essential hypertension 9/28/2021 Yes    Elevated C-reactive protein (CRP) 9/28/2021 Yes    COVID-19 9/30/2021 Yes    Type 2 diabetes mellitus with hyperglycemia, with long-term current use of insulin ([facility] 9/28/2021 Yes          Plan:        COVID-19 viral pneumonia:   - Infectious disease following  -Completed Decadron therapy high-dose  - Actemra received 9/22  -Out of isolation 10-5-2021    Acute hypoxic respiratory failure:   - On high flow nasal cannula. - Pulmonology following  - Continue combivent scheduled    Sepsis from covid: Secondary to #1    serous otitis media evaluated at bedside, noted some swelling and discoloration. Still complaining of hearing loss, Augmentin ordered for 10 days after ENT evaluation, suspect to be serous otitis media    Right Knee pain -unclear etiology, multiple spots are now present on her lower extremities, questionable if this could be septic emboli. Will discuss with infectious disease and orthopedic surgery. Will obtain blood cultures, CT PE protocol. CRP is very elevated this morning    Essential hypertension: Currently stable off of meds. Diabetes mellitus type 2:   -  decrease lantus to 25 units BID    Morbid obesity (BMI 45.69): Weight loss encouraged    GI/DVT prophylaxis: Pepcid, Lovenox twice daily    Disposition: No changes to diabetes medications, check blood cultures and repeat labs. CRP very elevated this morning at 143. Discussed with infectious disease and orthopedic surgery, encouraged him to obtain fluid studies to rule out septic joint.   Currently on Augmentin for serous otitis media.     Raji Martinez DO  10/8/2021  12:41 PM

## 2021-10-08 NOTE — CARE COORDINATION
Discharge order removed as pt is not stable to transfer. Camila Bloom at [facility] AT Count includes the [facility] updated.  He will continue to follow

## 2021-10-08 NOTE — PROGRESS NOTES
PULMONARY & CRITICAL CARE MEDICINE PROGRESS NOTE     Patient:  Ervin Umaña  MRN: [id number]  Admit date: 9/21/2021  Primary Care Physician: Sarai Barrett  Consulting Physician: Karen Cortes DO  CODE Status: Full Code  LOS: 11     SUBJECTIVE     CHIEF COMPLAINT/REASON FOR INITIAL CONSULT:   Acute respiratory failure/COVID-19 pneumonia    BRIEF HOSPITAL COURSE:   The patient is a 45 y.o. female history of diabetes mellitus, hypertension, morbid obesity  unvaccinated was admitted with shortness of breath and diagnosed with COVID-19 pneumonia. She apparently was diagnosed about 4 days ago. There was associated cough that is mostly dry and also having some diarrhea. Patient required increasing oxygen with initial oxygen saturation being in the 80s. No previous history of lung disease  Patient is a non-smoker    INTERVAL HISTORY:  10/8/2021   Seen and examined at bedside. Afebrile, hemodynamically stable  Saturating 95% on HFNC 50L at 90%  BIPAP used overnight  Labs reviewed. Seen by ent for serious otitis media   Seen by ortho for rt knee swelling        REVIEW OF SYSTEMS:  Review of Systems   Constitutional: Positive for fatigue. Negative for appetite change and fever. HENT: Negative for postnasal drip, rhinorrhea, sore throat, trouble swallowing and voice change. Eyes: Negative for redness and visual disturbance. Respiratory: Positive for cough and shortness of breath. Negative for wheezing. Cardiovascular: Negative for chest pain, palpitations and leg swelling. Gastrointestinal: Negative for abdominal pain, constipation, diarrhea, nausea and vomiting. Endocrine: Negative for polyuria. Genitourinary: Negative for dysuria, frequency, hematuria and urgency. Musculoskeletal: Negative. Allergic/Immunologic: Negative. Neurological: Negative for dizziness, syncope, speech difficulty and headaches. Hematological: Negative for adenopathy.  Does not bruise/bleed easily. Psychiatric/Behavioral: Negative. pulm  OBJECTIVE     PaO2/FiO2 RATIO:  No results for input(s): POCPO2 in the last 72 hours. FiO2 : 80 %     VITAL SIGNS:   LAST:  /81   Pulse 88   Temp 97.5 °F (36.4 °C) (Axillary)   Resp 26   Ht 5' 3\" (1.6 m)   Wt 254 lb 3.1 oz (115.3 kg)   SpO2 95%   BMI 45.03 kg/m²   8-24 HR RANGE:  TEMP Temp  Av.8 °F (36.6 °C)  Min: 97.3 °F (36.3 °C)  Max: 98.1 °F (56.6 °C)   BP Systolic (20JFE), KNC:549 , Min:99 , TGA:681      Diastolic (80AKP), FPW:17, Min:63, Max:81     PULSE Pulse  Av.9  Min: 64  Max: 103   RR Resp  Av.2  Min: 23  Max: 30   O2 SAT SpO2  Av.8 %  Min: 93 %  Max: 97 %   OXYGEN DELIVERY O2 Flow Rate (L/min)  Av.5 L/min  Min: 50 L/min  Max: 60 L/min        SYSTEMIC EXAMINATION:   General appearance - Ill-appearing, mild to moderate respiratory distress  Mental status - awake & alert, follows commands  Eyes - pupils equal and reactive, sclera anicteric  Mouth - mucous membranes moist, pharynx normal without lesions. Large tongue Mallampati 2  Neck -Short thick neck supple, no significant adenopathy, carotids upstroke normal bilaterally, no bruits  Chest - There is no intercostal recession or use of accessory muscles. Breath sounds bilaterally were dimnished to auscultation at bases. There were mild crackles present at bases. No expiratory wheezing and no rhonchi  Heart - normal rate, regular rhythm, normal S1, S2, no murmurs, rubs, clicks or gallops  Abdomen - soft, nontender, nondistended, no masses or organomegaly  Neurological - non-focal  Extremities - peripheral pulses normal, mild bilateral pedal edema, no clubbing or cyanosis.   No calf tenderness  Skin - normal coloration and turgor, no rashes, no suspicious skin lesions noted     DATA REVIEW     Medications: Current Inpatient  Scheduled Meds:   dexamethasone  10 mg IntraVENous Daily    furosemide  20 mg IntraVENous Daily    insulin glargine  50 Units SubCUTAneous BID    vitamin C  500 mg Oral BID    fluticasone  1 spray Each Nostril Daily    cetirizine  10 mg Oral Daily    insulin lispro  0-18 Units SubCUTAneous TID WC    insulin lispro  0-9 Units SubCUTAneous Nightly    famotidine  20 mg Oral Daily    aspirin  81 mg Oral Daily    ferrous sulfate  325 mg Oral Daily with breakfast    sodium chloride flush  5-40 mL IntraVENous 2 times per day    enoxaparin  30 mg SubCUTAneous BID    Vitamin D  2,000 Units Oral Daily     Continuous Infusions:   dextrose      sodium chloride 25 mL (09/22/21 1822)       INPUT/OUTPUT:  In: 640 [P.O.:640]  Out: 1900 [Urine:1900]  Date 10/02/21 0000 - 10/02/21 2359   Shift 1606-1931 6196-4528 9113-1546 24 Hour Total   INTAKE   Shift Total(mL/kg)       OUTPUT   Urine(mL/kg/hr)  1100  1100   Shift Total(mL/kg)  1100(9.5)  1100(9.5)   Weight (kg) 115.3 115.3 115.3 115.3        LABS:  ABGs:   No results for input(s): POCPH, POCPCO2, POCPO2, POCHCO3, [id number] in the last 72 hours. CBC:   Recent Labs     09/30/21  0507 10/02/21  0547   WBC 10.8 11.9*   HGB 15.2* 15.7*   HCT 45.1 45.7   MCV 87.4 85.4    216   LYMPHOPCT 10* 12*   RBC 5.16* 5.35*   MCH 29.5 29.3   MCHC 33.7 34.4   RDW 13.2 13.3     CRP:   Recent Labs     09/30/21  0507   CRP <3.0     LDH:   No results for input(s): LDH in the last 72 hours. BMP:   Recent Labs     09/30/21  0507 10/02/21  0547    138   K 4.0 4.1    97*   CO2 24 28   BUN 30* 26*   CREATININE 0.56 0.75   GLUCOSE 194* 194*     Liver Function Test:   No results for input(s): PROT, LABALBU, ALT, AST, GGT, ALKPHOS, BILITOT in the last 72 hours. Coagulation Profile:   No results for input(s): INR, PROTIME, APTT in the last 72 hours. D-Dimer:  No results for input(s): DDIMER in the last 72 hours. Ferritin:    No results for input(s): FERRITIN in the last 72 hours. Lactic Acid:  No results for input(s): LACTA in the last 72 hours.   Cardiac Enzymes:  No results for input(s): CKTOTAL, CKMB, Juan Bergman in the last 72 hours. Invalid input(s): TROPONIN, HSTROP  BNP/ProBNP:   No results for input(s): BNP, PROBNP in the last 72 hours. Triglycerides:  No results for input(s): TRIG in the last 72 hours. Microbiology:  Urine Culture:  No components found for: CURINE  Blood Culture:  No components found for: CBLOOD, CFUNGUSBL  Sputum Culture:  No components found for: CSPUTUM  No results for input(s): SPECDESC, SPECIAL, CULTURE, STATUS, ORG, CDIFFTOXPCR, CAMPYLOBPCR, SALMONELLAPC, SHIGAPCR, SHIGELLAPCR, MPNEUG, MPNEUM, LACTOQL in the last 72 hours. No results for input(s): SPUTUM, SPECDESC, SPECIAL, CULTURE, STATUS, ORG, CDIFFTOXPCR, MPNEUM, MPNEUG in the last 72 hours. Invalid input(s): CURINE, CBLOOD, CFUNGUSBL     Pathology:    Radiology Reports:  XR CHEST PORTABLE   Final Result   Limited exam, findings suggest progression in extensive bilateral coarse   alveolar infiltrates suggesting progression in multifocal pneumonia in COVID   positive patient         XR CHEST (SINGLE VIEW FRONTAL)   Final Result   Bilateral diffuse pulmonary edema and or pneumonia without definite effusion. No cardiomegaly was identified. Slight improvement bilaterally has occurred from 09/21/2021. XR CHEST PORTABLE   Final Result   Multifocal airspace opacities worrisome multifocal atypical viral pneumonia. Low lung volumes              Echocardiogram:   No results found for this or any previous visit. ASSESSMENT AND PLAN     Assessment:    // Acute hypoxic respiratory failure  // Acute respiratory distress syndrome  // Bilateral multifocal pneumonia due to COVID 19 infection  // Diabetes mellitus.  // Morbid obesity  // Likely obstructive sleep apnea/hypoventilation  // Hypertension  // E. coli in urine    BIPAP used overnight  Labs reviewed.     Plan:    -High flow nasal cannula during the daytime, bipap at night and prn   -Saturating 95% on HFNC 50L at 90%  -Monitor urine output renal function and electrolytes  - continue augmentin  - Tested positive:  9/17/21  - Symptom onset:  9/16/21  - Out of Isolation: No  - Vaccinated: No  - Remdesivir: 9/22  - Decadron:  Restarted 9/2 -10 mg daily  - Actemra: 9/22/21  - Fluid Balance: euvolemic  - Glycemic Control: Per primary team.     Discussed with respiratory therapist treatment plan discussed  Discussed with nursing staff. Total critical care time caring for this patient with life threatening, unstable organ failure, including direct patient contact, management of life support systems, review of data including imaging and labs, discussions with other team members and physicians at least 27  Min so far today, excluding procedures. Gayle Gonzalez MD  Internal Medicine Resident, PGY-3  [address], [place]  10/8/2021, 11:21 AM      This patient was evaluated in the context of the global SARS-CoV-2 (COVID-19) pandemic, which necessitated considerations that the patient either has COVID-19 infection or is at risk of infection with COVID-19. Institutional protocols and algorithms that pertain to the evaluation & management of patients with COVID-19 or those at risk for COVID-19 are in a state of rapid changes based on information released by regulatory bodies including the CDC and federal and state organizations. These policies and algorithms were followed during the patient's care. Please note that this chart was generated using voice recognition Dragon dictation software. Although every effort was made to ensure the accuracy of this automated transcription, some errors in transcription may have occurred. Attending Physician Statement  I have discussed the care of Godwin Soriano, including pertinent history and exam findings with the resident. I have reviewed the key elements of all parts of the encounter with the resident. I have seen and examined the patient with the resident.   I agree with the assessment and plan and status of the problem list as documented. I seen the patient during my round this morning, chart reviewed, labs and medications reviewed overnight events noted. She is afebrile overnight she is hemodynamically stable her heart rate is 100 to 110. When I examined her she was alert and awake follows command she has bilateral crackles on exam he has mild to trace pedal edema. Overnight she required increase high flow nasal cannula with 50 L from 40 L and 90% FiO2. She was on BiPAP 20/10 with 75% FiO2 was desaturating on minimal movement she is complaining of shortness of breath at rest she is tachypneic with mild to moderate distress but alert arousable able to talk. We will continue with high flow nasal cannula during the daytime she will need BiPAP intermittently during the daytime. She does not complain of chest pain no hemoptysis and she was afebrile. She had finished extended dose of Decadron also. We will recommend to use Lasix daily as she has worsening PO2/FiO2 ratio and worsening hypoxia may need extra dose of Lasix later in the afternoon monitor electrolytes potassium BMP while she is on Lasix. CT chest was ordered by Dr. Case Sin I reviewed the CT chest shows extensive bilateral pulmonary infiltrate and groundglass changes in all the lobes large central pulmonary arteries did not show evidence of pulmonary embolism visualized portion of subsegmental segmental arteries did not show evidence of embolism but limited because of extensive infiltrate. Continue to observe closely in ICU as she remains at high risk for intubation and requirement of ventilatory support. We will continue with BiPAP and intermittently high flow nasal cannula. Follow-up with infectious disease. Last CRP was 5.9 yesterday. Discussed with primary service  Discussed with nursing staff, treatment and plan discussed.   Discussed with respiratory therapist.    Total critical care time caring for this patient with life threatening, unstable organ failure, including direct patient contact, management of life support systems, review of data including imaging and labs, discussions with other team members and physicians at least 35 min so far today, excluding procedures. Please note that this chart was generated using voice recognition Dragon dictation software. Although every effort was made to ensure the accuracy of this automated transcription, some errors in transcription may have occurred.      Suellen Fuentes MD  10/8/2021 11:22 AM

## 2021-10-08 NOTE — PLAN OF CARE
Problem: Airway Clearance - Ineffective  Goal: Achieve or maintain patent airway  10/8/2021 0634 by Ac Johnston RN  Outcome: Ongoing  10/8/2021 0042 by Tio Vera RN  Outcome: Ongoing     Problem: Gas Exchange - Impaired  Goal: Absence of hypoxia  10/8/2021 0634 by Ac Johnston RN  Outcome: Ongoing  10/8/2021 0042 by Tio eVra RN  Outcome: Ongoing  10/7/2021 2345 by Breezy Mcintosh RCP  Outcome: Ongoing  Goal: Promote optimal lung function  10/8/2021 0634 by Ac Johnston RN  Outcome: Ongoing  10/8/2021 0042 by Tio Vera RN  Outcome: Ongoing  10/7/2021 2345 by Breezy Mcintosh RCP  Outcome: Ongoing     Problem: Breathing Pattern - Ineffective  Goal: Ability to achieve and maintain a regular respiratory rate  10/8/2021 0634 by Ac Johnston RN  Outcome: Ongoing  10/8/2021 0042 by Tio Vera RN  Outcome: Ongoing  10/7/2021 2345 by Breezy Mcintosh RCP  Outcome: Ongoing     Problem:  Body Temperature -  Risk of, Imbalanced  Goal: Ability to maintain a body temperature within defined limits  10/8/2021 0634 by Ac Johnston RN  Outcome: Ongoing  10/8/2021 0042 by Tio Vera RN  Outcome: Ongoing  Goal: Will regain or maintain usual level of consciousness  10/8/2021 0634 by Ac Johnston RN  Outcome: Ongoing  10/8/2021 0042 by Tio Vera RN  Outcome: Ongoing  Goal: Complications related to the disease process, condition or treatment will be avoided or minimized  10/8/2021 0634 by Ac Johnston RN  Outcome: Ongoing  10/8/2021 0042 by Tio Vera RN  Outcome: Ongoing     Problem: Isolation Precautions - Risk of Spread of Infection  Goal: Prevent transmission of infection  10/8/2021 0634 by Ac Jonhston RN  Outcome: Ongoing  10/8/2021 0042 by Tio Vera RN  Outcome: Ongoing     Problem: Nutrition Deficits  Goal: Optimize nutritional status  10/8/2021 0634 by Ac Johnston RN  Outcome: Ongoing  10/8/2021 0042 by France Francisco RN  Outcome: Ongoing     Problem: Risk for Fluid Volume Deficit  Goal: Maintain normal heart rhythm  10/8/2021 0634 by Meli Oscar RN  Outcome: Ongoing  10/8/2021 0042 by France Francisco RN  Outcome: Ongoing  Goal: Maintain absence of muscle cramping  10/8/2021 0634 by Meli Oscar RN  Outcome: Ongoing  10/8/2021 0042 by France Francisco RN  Outcome: Ongoing  Goal: Maintain normal serum potassium, sodium, calcium, phosphorus, and pH  10/8/2021 0634 by Meli Oscar RN  Outcome: Ongoing  10/8/2021 0042 by France Francisco RN  Outcome: Ongoing     Problem: Loneliness or Risk for Loneliness  Goal: Demonstrate positive use of time alone when socialization is not possible  10/8/2021 0634 by Meli Oscar RN  Outcome: Ongoing  10/8/2021 0042 by France Francisco RN  Outcome: Ongoing     Problem: Fatigue  Goal: Verbalize increase energy and improved vitality  10/8/2021 0634 by Meli Oscar RN  Outcome: Ongoing  10/8/2021 0042 by France Francisco RN  Outcome: Ongoing     Problem: Patient Education: Go to Patient Education Activity  Goal: Patient/Family Education  10/8/2021 1066 by Meli Oscar RN  Outcome: Ongoing  10/8/2021 0042 by France Francisco RN  Outcome: Ongoing     Problem: Falls - Risk of:  Goal: Will remain free from falls  Description: Will remain free from falls  10/8/2021 0634 by Meli Oscar RN  Outcome: Ongoing  10/8/2021 0042 by France Francisco RN  Outcome: Ongoing  Goal: Absence of physical injury  Description: Absence of physical injury  10/8/2021 0634 by Meli Oscar RN  Outcome: Ongoing  10/8/2021 0042 by France Francisco RN  Outcome: Ongoing     Problem: Nutrition  Goal: Optimal nutrition therapy  10/8/2021 0634 by Meli Oscar RN  Outcome: Ongoing  10/8/2021 0042 by France Francisco RN  Outcome: Ongoing     Problem: OXYGENATION/RESPIRATORY FUNCTION  Goal: Patient will achieve/maintain normal respiratory rate/effort  Description: Respiratory rate and effort will be within normal limits for the patient  10/8/2021 [id number] by Alesia Kruse RN  Outcome: Ongoing  10/8/2021 0042 by Jennifer Kelly RN  Outcome: Ongoing     Problem: SKIN INTEGRITY  Goal: Skin integrity is maintained or improved  10/8/2021 0634 by Alesia Kruse RN  Outcome: Ongoing  10/8/2021 0042 by Jennifer Kelly RN  Outcome: Ongoing     Problem: Skin Integrity:  Goal: Will show no infection signs and symptoms  Description: Will show no infection signs and symptoms  10/8/2021 0634 by Alesia Kruse RN  Outcome: Ongoing  10/8/2021 0042 by Jennifer Kelly RN  Outcome: Ongoing  Goal: Absence of new skin breakdown  Description: Absence of new skin breakdown  10/8/2021 0634 by Alesia Kruse RN  Outcome: Ongoing  10/8/2021 0042 by Jennifer Kelly RN  Outcome: Ongoing

## 2021-10-08 NOTE — PROGRESS NOTES
Physical Therapy        Physical Therapy Cancel Note      DATE: 10/8/2021    NAME: Sanjuana Costa  MRN: [id number]   : 1980      Patient not seen this date for Physical Therapy due to:    Testing: at CT.  Ck back as able      Electronically signed by Judy Haskins PT on 10/8/2021 at 10:58 AM

## 2021-10-08 NOTE — PROGRESS NOTES
The transport originated from [facility]. Pt. was transported to CT. Assisting with the transport was RT, RN and floor aid. Appropriate devices were applied to monitor the patient's condition during transport. Patient transported  via 100% O2 via BIPAP. Patient tolerated well.         Jenni Diego RCP  11:02 AM

## 2021-10-08 NOTE — PLAN OF CARE
Problem: Airway Clearance - Ineffective  Goal: Achieve or maintain patent airway  10/8/2021 1623 by Levon Parry RN  Outcome: Ongoing  10/8/2021 0634 by Evy Paula RN  Outcome: Ongoing     Problem: Gas Exchange - Impaired  Goal: Absence of hypoxia  10/8/2021 1623 by Levon Parry RN  Outcome: Ongoing  10/8/2021 0634 by Evy Paula RN  Outcome: Ongoing  Goal: Promote optimal lung function  10/8/2021 1623 by Levon Parry RN  Outcome: Ongoing  10/8/2021 0634 by Evy Paula RN  Outcome: Ongoing     Problem: Breathing Pattern - Ineffective  Goal: Ability to achieve and maintain a regular respiratory rate  10/8/2021 1623 by Levon Parry RN  Outcome: Ongoing  10/8/2021 0634 by Evy Paula RN  Outcome: Ongoing     Problem:  Body Temperature -  Risk of, Imbalanced  Goal: Ability to maintain a body temperature within defined limits  10/8/2021 1623 by Levon Parry RN  Outcome: Ongoing  10/8/2021 0634 by Evy Paula RN  Outcome: Ongoing  Goal: Will regain or maintain usual level of consciousness  10/8/2021 1623 by Levon Parry RN  Outcome: Ongoing  10/8/2021 0634 by Evy Paula RN  Outcome: Ongoing  Goal: Complications related to the disease process, condition or treatment will be avoided or minimized  10/8/2021 1623 by Levon Parry RN  Outcome: Ongoing  10/8/2021 0634 by Evy Paula RN  Outcome: Ongoing     Problem: Isolation Precautions - Risk of Spread of Infection  Goal: Prevent transmission of infection  10/8/2021 1623 by Levon Parry RN  Outcome: Ongoing  10/8/2021 0634 by Evy Paula RN  Outcome: Ongoing     Problem: Nutrition Deficits  Goal: Optimize nutritional status  10/8/2021 1623 by Levon Parry RN  Outcome: Ongoing  10/8/2021 0634 by Evy Paula RN  Outcome: Ongoing     Problem: Risk for Fluid Volume Deficit  Goal: Maintain normal heart rhythm  10/8/2021 1623 by Levon Parry RN  Outcome: Ongoing  10/8/2021 0634 by Evy Paula RN  Outcome: Ongoing  Goal: Maintain absence of muscle cramping  10/8/2021 1623 by Elinor Croft RN  Outcome: Ongoing  10/8/2021 0634 by Huyen Landers RN  Outcome: Ongoing  Goal: Maintain normal serum potassium, sodium, calcium, phosphorus, and pH  10/8/2021 1623 by Elinor Croft RN  Outcome: Ongoing  10/8/2021 0634 by Huyen Landers RN  Outcome: Ongoing     Problem: Loneliness or Risk for Loneliness  Goal: Demonstrate positive use of time alone when socialization is not possible  10/8/2021 1623 by Elinor Croft RN  Outcome: Ongoing  10/8/2021 0634 by Huyen Landers RN  Outcome: Ongoing     Problem: Fatigue  Goal: Verbalize increase energy and improved vitality  10/8/2021 1623 by Elinor Croft RN  Outcome: Ongoing  10/8/2021 0634 by Huyen Landers RN  Outcome: Ongoing     Problem: Fatigue  Goal: Verbalize increase energy and improved vitality  10/8/2021 1623 by Elinor Croft RN  Outcome: Ongoing  10/8/2021 0634 by Huyen Landers RN  Outcome: Ongoing     Problem: Falls - Risk of:  Goal: Will remain free from falls  Description: Will remain free from falls  10/8/2021 1623 by Elinor Croft RN  Outcome: Ongoing  10/8/2021 0634 by Huyen Landers RN  Outcome: Ongoing  Goal: Absence of physical injury  Description: Absence of physical injury  10/8/2021 1623 by Elinor Croft RN  Outcome: Ongoing  10/8/2021 0634 by Huyen Landers RN  Outcome: Ongoing     Problem: Nutrition  Goal: Optimal nutrition therapy  10/8/2021 1623 by Elinor Croft RN  Outcome: Ongoing  10/8/2021 0634 by Huyen Landers RN  Outcome: Ongoing     Problem: OXYGENATION/RESPIRATORY FUNCTION  Goal: Patient will achieve/maintain normal respiratory rate/effort  Description: Respiratory rate and effort will be within normal limits for the patient  10/8/2021 1623 by Elinor Croft RN  Outcome: Ongoing  10/8/2021 0634 by Huyen Landers RN  Outcome: Ongoing     Problem: SKIN INTEGRITY  Goal: Skin integrity is maintained or improved  10/8/2021 1623 by Charu Huizar RN  Outcome: Ongoing  10/8/2021 0634 by Otis Douglass RN  Outcome: Ongoing     Problem: Skin Integrity:  Goal: Will show no infection signs and symptoms  Description: Will show no infection signs and symptoms  10/8/2021 1623 by Charu Huizar RN  Outcome: Ongoing  10/8/2021 0634 by Otis Douglass RN  Outcome: Ongoing  Goal: Absence of new skin breakdown  Description: Absence of new skin breakdown  10/8/2021 1623 by Charu Huizar RN  Outcome: Ongoing  10/8/2021 0634 by Otis Douglass RN  Outcome: Ongoing     Problem: Pain:  Goal: Pain level will decrease  Description: Pain level will decrease  Outcome: Ongoing  Goal: Control of acute pain  Description: Control of acute pain  Outcome: Ongoing  Goal: Control of chronic pain  Description: Control of chronic pain  Outcome: Ongoing   Questions and concerns addressed with pt at bedside as needed.

## 2021-10-08 NOTE — PLAN OF CARE
Problem: Gas Exchange - Impaired  Goal: Absence of hypoxia  Outcome: Ongoing     Problem: Gas Exchange - Impaired  Goal: Promote optimal lung function  Outcome: Ongoing     Problem: Breathing Pattern - Ineffective  Goal: Ability to achieve and maintain a regular respiratory rate  Outcome: Ongoing   PROVIDE ADEQUATE OXYGENATION WITH ACCEPTABLE SP02/ABG'S    [x]  IDENTIFY APPROPRIATE OXYGEN THERAPY  [x]   MONITOR SP02/ABG'S AS NEEDED   [x]   PATIENT EDUCATION AS NEEDED   NON INVASIVE VENTILATION  PROVIDE OPTIMAL VENTILATION/ACCEPTABLE SP02  IMPLEMENT NON INVASIVE VENTILATION PROTOCOL  ASSESSMENT SKIN INTEGRITY  PATIENT EDUCATION AS NEEDED  BIPAP AS NEEDED

## 2021-10-08 NOTE — PROGRESS NOTES
[facility] -   Infectious diseases evaluation  admission date 9/21/2021    reason for consultation:   COVID    Impression :   Current:    Covid, pneumonia  Increase inflammatory markers    ·   Discussion / summary of stay / plan of care   ·   Recommendations   · Took a dose of Actemra 9/22  · protocol 10 mg Decadron till 10/6  · Anticoagulation  · She responded by CRP, started to respond clinically slowly   · 10/8 - worse Oxygenation - CRP back up -concerns for pneumonia   · 2 BC and start ceftaroline  · FU CRP and o2 requirement      Remove isolation 10/5  Infection Control Recommendations   · [place] Precautions      Sick since 9/13  Stop isolation 10/5    Antimicrobial Stewardship Recommendations   · Of antibiotics    Coordination ofOutpatient Care:   · Estimated Length of IV antimicrobials:  · Patient will need Midline / picc Catheter Insertion:   · Patient will need SNF:  · Patient will need outpatient wound care:     History of Present Illness:   Initial history:  Mikael Menendez is a 45y.o.-year-old female     Interval changes  10/8/2021   Patient Vitals for the past 8 hrs:   BP Temp Temp src Pulse Resp SpO2   10/08/21 1300 97/74   116 (!) 40 99 %   10/08/21 0910 128/81 98.2 °F (36.8 °C) Oral 117 (!) 37 90 %   10/08/21 0822     25 91 %   10/08/21 0730     25 95 %       Poor turnaround overnight and she is now on 80% high flow alternating with 100%. She is eating she has no fever, she had taken Actemra, a little depressed, was encouraged on the bedside    9/29: Labs within range, chest x-ray showing progression, the patient is on higher oxygenation, 90% BiPAP and saturating 90% only. Abdomen soft nontender otherwise. Alert    -128-47    9/30:  CRP < 3 -WBC 10  Patient feels better, is on high flow 80%, saturation 88-92% and depends on if she is at rest or moving evaluate.   No new positive cultures, trying to wean off oxygen slowly    10/5  On high flow 86% oxygen, patient saturating 92%. Alert appropriate, anxious to get better, improving slowly. Encourage    10/6  Isolation removed today, patient is on high flow FiO2 82%, similar to before. She looks good feels better though. Eating better, limited mobility due to desaturation. 10/7  Remains without fever- CRP pending today  Chest x-ray shows same infiltrates    -128-47 - 10  W11  Isolation removed  75% FiO2 high flow, chest x-ray same infiltrates    10/8  CXR worse and CRP higher 143- on 100 bypap- very anxious and seems worse  Skin blotching over the knees does not seem of a concern - right lower leg and knee pain but no joint swelling-  Suspect the crp elevation is related to a bact superinfection possibly pulm, start ceftaroline and get 2 BCV        Allergies:   Cephalexin and Codeine     Review of Systems:     Review of Systems   Constitutional: Negative for activity change and diaphoresis. HENT: Negative for congestion. Eyes: Negative for pain, discharge, redness and itching. Respiratory: Positive for shortness of breath. Negative for apnea and chest tightness. Cardiovascular: Negative for chest pain. Gastrointestinal: Negative for abdominal distention. Endocrine: Negative for heat intolerance. Genitourinary: Negative for dysuria, flank pain and frequency. Musculoskeletal: Negative for arthralgias. Skin: Negative for color change. Allergic/Immunologic: Negative for immunocompromised state. Neurological: Negative for dizziness, light-headedness and numbness. Hematological: Negative for adenopathy. Psychiatric/Behavioral: Negative for agitation. Physical Examination :       Physical Exam  Constitutional:       General: She is not in acute distress. Appearance: Normal appearance. She is ill-appearing. She is not toxic-appearing or diaphoretic. HENT:      Head: Normocephalic and atraumatic.       Nose: Nose normal.   Eyes: General: No scleral icterus. Conjunctiva/sclera: Conjunctivae normal.      Pupils: Pupils are equal, round, and reactive to light. Cardiovascular:      Rate and Rhythm: Normal rate and regular rhythm. Heart sounds: No murmur heard. No friction rub. Pulmonary:      Effort: No respiratory distress. Breath sounds: Normal breath sounds. Abdominal:      General: There is no distension. Palpations: Abdomen is soft. Tenderness: There is no abdominal tenderness. Genitourinary:     Comments: No hernandez  Musculoskeletal:         General: No swelling, tenderness, deformity or signs of injury. Cervical back: Neck supple. No rigidity or tenderness. Right lower leg: No edema. Skin:     General: Skin is dry. Coloration: Skin is not jaundiced or pale. Findings: No bruising, erythema or lesion. Neurological:      General: No focal deficit present. Mental Status: She is alert and oriented to person, place, and time.    Psychiatric:         Mood and Affect: Mood normal.         Behavior: Behavior normal.         Past Medical History:     Past Medical History:   Diagnosis Date    Anemia     Chronic back pain     Diabetes mellitus ([facility] [address]     H/O LEEP     Hypertension     Obesity, morbid, BMI 40.0-49.9 ([facility] [address] 9/28/2021    S/P endometrial ablation        Past Surgical  History:     Past Surgical History:   Procedure Laterality Date    ENDOMETRIAL ABLATION      LEEP         Medications:      busPIRone  10 mg Oral TID    furosemide  20 mg IntraVENous Daily    ceftaroline fosamil (TEFLARO) IVPB  600 mg IntraVENous Q12H    celecoxib  50 mg Oral BID    insulin glargine  25 Units SubCUTAneous BID    enoxaparin  40 mg SubCUTAneous Daily    cetirizine  10 mg Oral Daily    insulin lispro  0-18 Units SubCUTAneous TID WC    insulin lispro  0-9 Units SubCUTAneous Nightly    famotidine  20 mg Oral Daily    aspirin  81 mg Oral Daily    ferrous sulfate  325 mg Oral Daily with breakfast    sodium chloride flush  5-40 mL IntraVENous 2 times per day       Social History:     Social History     Socioeconomic History    Marital status: Single     Spouse name: Not on file    Number of children: Not on file    Years of education: Not on file    Highest education level: Not on file   Occupational History    Not on file   Tobacco Use    Smoking status: Never Smoker    Smokeless tobacco: Never Used   Substance and Sexual Activity    Alcohol use: Yes     Comment: occa.  Drug use: No    Sexual activity: Not on file   Other Topics Concern    Not on file   Social History Narrative    Not on file     Social Determinants of Health     Financial Resource Strain:     Difficulty of Paying Living Expenses:    Food Insecurity:     Worried About Running Out of Food in the Last Year:     [address] in the Last Year:    Transportation Needs:     Lack of Transportation (Medical):      Lack of Transportation (Non-Medical):    Physical Activity:     Days of Exercise per Week:     Minutes of Exercise per Session:    Stress:     Feeling of Stress :    Social Connections:     Frequency of Communication with Friends and Family:     Frequency of Social Gatherings with Friends and Family:     Attends [employer] Services:     Active Member of Clubs or Organizations:     Attends Club or Organization Meetings:     Marital Status:    Intimate Partner Violence:     Fear of Current or Ex-Partner:     Emotionally Abused:     Physically Abused:     Sexually Abused:        Family History:     Family History   Problem Relation Age of Onset    Hypotension Mother     Heart Disease Father     Heart Failure Father       Medical Decision Making:   I have independently reviewed/ordered the following labs:    CBC with Differential:   Recent Labs     10/06/21  0328 10/08/21  1004   WBC 11.8* 9.6   HGB 15.2* 14.2   HCT 44.9 41.6    152   LYMPHOPCT 16*  --    MONOPCT 6  --      BMP:  Recent Labs     10/06/21  0328 10/08/21  1004    138   K 3.8 3.7   CL 98 100   CO2 28 24   BUN 31* 18   CREATININE 0.54 0.60   MG  --  1.8     Hepatic Function Panel:   Recent Labs     10/06/21  0328 10/08/21  1004   PROT 5.5* 5.6*   LABALBU 3.3* 3.0*  3.0*   BILIDIR 0.22 0.30   IBILI 0.70 0.88   BILITOT 0.92 1.18   ALKPHOS 82 108*   ALT 46* 53*   AST 25 38*     No results for input(s): RPR in the last 72 hours. No results for input(s): HIV in the last 72 hours. No results for input(s): BC in the last 72 hours. Lab Results   Component Value Date    CREATININE 0.60 10/08/2021    GLUCOSE 196 10/08/2021       Detailed results: Thank you for allowing us to participate in the care of this patient. Please call with questions. This note is created with the assistance of a speech recognition program.  While intending to generate adocument that actually reflects the content of the visit, the document can still have some errors including those of syntax and sound a like substitutions which may escape proof reading. It such instances, actual meaningcan be extrapolated by contextual diversion.     William Rowe MD  Office: [phone]  Perfect serve / office [phone]

## 2021-10-08 NOTE — PLAN OF CARE
Problem: Airway Clearance - Ineffective  Goal: Achieve or maintain patent airway  Outcome: Ongoing     Problem: Gas Exchange - Impaired  Goal: Absence of hypoxia  10/8/2021 0042 by Daren Cunha RN  Outcome: Ongoing    Goal: Promote optimal lung function  10/8/2021 0042 by Daren Cunha RN  Outcome: Ongoing       Problem: Breathing Pattern - Ineffective  Goal: Ability to achieve and maintain a regular respiratory rate  10/8/2021 0042 by Daren Cunha RN  Outcome: Ongoing       Problem:  Body Temperature -  Risk of, Imbalanced  Goal: Ability to maintain a body temperature within defined limits  Outcome: Ongoing  Goal: Will regain or maintain usual level of consciousness  Outcome: Ongoing  Goal: Complications related to the disease process, condition or treatment will be avoided or minimized  Outcome: Ongoing     Problem: Isolation Precautions - Risk of Spread of Infection  Goal: Prevent transmission of infection  Outcome: Ongoing     Problem: Nutrition Deficits  Goal: Optimize nutritional status  Outcome: Ongoing     Problem: Risk for Fluid Volume Deficit  Goal: Maintain normal heart rhythm  Outcome: Ongoing  Goal: Maintain absence of muscle cramping  Outcome: Ongoing  Goal: Maintain normal serum potassium, sodium, calcium, phosphorus, and pH  Outcome: Ongoing     Problem: Loneliness or Risk for Loneliness  Goal: Demonstrate positive use of time alone when socialization is not possible  Outcome: Ongoing     Problem: Fatigue  Goal: Verbalize increase energy and improved vitality  Outcome: Ongoing     Problem: Patient Education: Go to Patient Education Activity  Goal: Patient/Family Education  Outcome: Ongoing     Problem: Falls - Risk of:  Goal: Will remain free from falls  Description: Will remain free from falls  Outcome: Ongoing  Goal: Absence of physical injury  Description: Absence of physical injury  Outcome: Ongoing     Problem: Nutrition  Goal: Optimal nutrition therapy  Outcome: Ongoing     Problem: OXYGENATION/RESPIRATORY FUNCTION  Goal: Patient will achieve/maintain normal respiratory rate/effort  Description: Respiratory rate and effort will be within normal limits for the patient  Outcome: Ongoing     Problem: SKIN INTEGRITY  Goal: Skin integrity is maintained or improved  Outcome: Ongoing     Problem: Skin Integrity:  Goal: Will show no infection signs and symptoms  Description: Will show no infection signs and symptoms  Outcome: Ongoing  Goal: Absence of new skin breakdown  Description: Absence of new skin breakdown  Outcome: Ongoing

## 2021-10-09 NOTE — PROGRESS NOTES
Paged primary to make aware pt tired and ready to be intubated. Paged [facility] team since CRNA is not avail.

## 2021-10-09 NOTE — ANESTHESIA PROCEDURE NOTES
Airway  Urgency: urgent    Airway not difficult    General Information and Staff    Patient location during procedure: ICU  Anesthesiologist: Reymundo Zimmerman MD  Resident/CRNA: SONJA Rodarte CRNA  Performed: resident/CRNA     Consent for Airway (if performed for an anesthetic, see related documentation for consents)  Patient identity confirmed: per hospital policy  Consent: The procedure was performed in an emergent situation. Verbal consent not obtained. Written consent not obtained. Risks and benefits: risks, benefits and alternatives were not discussed  Consent given by: patient      Code status verified:yes  Indications and Patient Condition  Indications for airway management: respiratory distress and respiratory failure  Spontaneous ventilation: present  Sedation level: deep  Preoxygenated: yes  Patient position: sniffing  MILS not maintained throughout  Mask difficulty assessment: not attempted    Final Airway Details  Final airway type: endotracheal airway      Successful airway: ETT  Cuffed: yes   Successful intubation technique: video laryngoscopy  Facilitating devices/methods: intubating stylet  Endotracheal tube insertion site: oral  Blade: Azael (glidescope)  Blade size: #3  ETT size (mm): 7.5  Cormack-Lehane Classification: grade I - full view of glottis  Placement verified by: chest auscultation, capnometry and radiography   Measured from: lips  ETT to lips (cm): 23  Number of attempts at approach: 1  Ventilation between attempts: none  Number of other approaches attempted: 0    Additional Comments  Induced with 200mg propofol and 180 mg succinylcholine. ETT placed successfully with positive EtC02 and color change.     Non-anticipated difficult airway: yes

## 2021-10-09 NOTE — ANESTHESIA PROCEDURE NOTES
Central Venous Line:    A central venous line was placed using ultrasound guidance, in the ICU for the following indication(s): central venous access. Sterility preparation included the following: hand hygiene performed prior to procedure, maximum sterile barriers used and sterile technique used to drape from head to toe. The patient was placed in Trendelenburg position. The right internal jugular vein was prepped. The site was prepped with Chloraprep. A 9 Fr (size), triple lumen was placed. During the procedure, the following specific steps were taken: target vein identified, needle advanced into vein and blood aspirated and guidewire advanced into vein. Intravenous verification was obtained by ultrasound, venous blood return and x-ray. Post insertion care included: all ports aspirated, all ports flushed easily, guidewire removed intact, Biopatch applied, line sutured in place and dressing applied. During the procedure the patient experienced: patient tolerated procedure well with no complications.       Insertion site scrubbed per usage guidelines?: Yes  Skin prep agent dried for 3 minutes prior to procedure?:yes  Anesthesia type: general..No  Staffing  Performed: Anesthesiologist   Anesthesiologist: Kacey Sarmiento MD  Preanesthetic Checklist  Completed: patient identified, IV checked, risks and benefits discussed, monitors and equipment checked, pre-op evaluation, timeout performed, anesthesia consent given, oxygen available and patient being monitored

## 2021-10-09 NOTE — DISCHARGE SUMMARY
[facility]  Office: [address], DO, Ailin Isaacs, DO, Mikaela Yadiel, DO, Eleazar Angel Blood, DO, Rigo Fink MD, Kvng Maya MD, Hermann Pisano MD, Yaw Kuo MD, Myriam Mcclendon MD, Wilma Tello MD, Avery Garrido MD, Mario Ortiz, DO, Mikayla Fitzpatrick, DO, Sol Fitch MD,  Enrique Joshi, DO, Norris Holter, MD, Sean Delgado MD, Anita Sanchez MD, Griselda Jesus MD, Baylee Ross MD, Claudia Powell MD, Betty Zhang CNP, [facility], CNP, Tanesha Richard, CNP, Teddy Bailey, CNS, Peterson Casey, CNP, Jose Angel Serrano, CNP, Beth Naqvi, CNP, Marisa Fish, CNP, Vanessa Weinstein, CNP, Ralph Monsalve PA-C, Tc Snyder, [facility], Megan Kearney, CNP, Ayanna Campbell, CNP, Yu Davidson, CNP, Valentina Humphries, CNP, Angie Justin, CNP, Mary Alice Meier, CNP, [facility], [facility]    Discharge Summary     Patient ID: Sanjuana Costa  :  1980   MRN: [id number]     ACCOUNT:  [de-identified]   Patient's PCP: Viktoria Rinne  Admit Date: 2021   Discharge Date: 10/9/2021    Length of Stay: 18  Code Status:  DNR-CCA  Admitting Physician: Mikayla Fitzpatrick DO  Discharge Physician: Mikayla Fitzpatrick DO     Active Discharge Diagnoses:     Hospital Problem Lists:  Principal Problem:    Pneumonia due to COVID-19 virus  Active Problems:    Acute respiratory failure with hypoxia (HCC)    Obesity, morbid, BMI 40.0-49.9 ([facility] [address]    Essential hypertension    Elevated C-reactive protein (CRP)    COVID-19    Type 2 diabetes mellitus with hyperglycemia, with long-term current use of insulin ([facility] [address]  Resolved Problems:    * No resolved hospital problems.  *      Admission Condition:  critical     Discharged Condition:     Hospital Stay:     Hospital Course:  Sanjuana Costa is a 45 y.o. female who was admitted for the management of   Pneumonia due to COVID-19 virus , presented to ER with Other (COVID+) and Shortness of Breath  Patient presented to the emergency department for shortness of breath, found to be Covid positive and received Actemra on 9/22/2021 and received 10 mg of Decadron until 10/6/2021. Unfortunately lung function recovered poorly, she remained on high flow nasal cannula and BiPAP dependent until 10/8/2021. On on 10/7/2021 she developed significant knee pain which was evaluated by orthopedic surgery, CRP on that day was 5.9 which was not suspicious for septic joint however the following day her CRP increased to 143. Case was discussed with orthopedic surgery and infectious disease, patient was started on Teflaro, CT pulmonary embolism study was ordered for rule out PE and evaluation for pneumonia. That evening patient continued decompensate, eventually required intubation by CRNA. After intubation patient's oxygen remained decreased, CO2 was found to be extremely elevated and attempts were made to sedate her and paralyzed her. Unfortunately patient's condition continued decompensate. She underwent cardiac arrest overnight and underwent multiple rounds of CPR, all attempts were made to resuscitate her which were unsuccessful. Family eventually presented at bedside and change CODE STATUS to DNR CC. Patient passed this morning at 421. [place] members were at bedside.     Significant therapeutic interventions: see above    Significant Diagnostic Studies:   Labs / Micro:  CBC:   Lab Results   Component Value Date    WBC 22.9 10/09/2021    RBC 4.46 10/09/2021    RBC 4.42 11/10/2011    HGB 13.4 10/09/2021    HCT 43.4 10/09/2021    MCV 97.3 10/09/2021    MCH 30.0 10/09/2021    MCHC 30.9 10/09/2021    RDW 14.1 10/09/2021     10/09/2021     11/10/2011     BMP:    Lab Results   Component Value Date    GLUCOSE 345 10/09/2021     10/09/2021    K 7.0 10/09/2021    CL 95 10/09/2021    CO2 17 10/09/2021    ANIONGAP 26 10/09/2021    BUN 19 10/09/2021    CREATININE 1.42 10/09/2021    BUNCRER NOT REPORTED 10/09/2021    CALCIUM 10.4 10/09/2021    LABGLOM 41 10/09/2021    GFRAA 50 10/09/2021    GFR      10/09/2021    GFR NOT REPORTED 10/09/2021     HFP:    Lab Results   Component Value Date    PROT 4.0 10/09/2021     CMP:    Lab Results   Component Value Date    GLUCOSE 345 10/09/2021     10/09/2021    K 7.0 10/09/2021    CL 95 10/09/2021    CO2 17 10/09/2021    BUN 19 10/09/2021    CREATININE 1.42 10/09/2021    ANIONGAP 26 10/09/2021    ALKPHOS 177 10/09/2021    ALT 3,849 10/09/2021    AST 4,216 10/09/2021    BILITOT 2.52 10/09/2021    LABALBU 2.3 10/09/2021    ALBUMIN 1.4 10/09/2021    LABGLOM 41 10/09/2021    GFRAA 50 10/09/2021    GFR      10/09/2021    GFR NOT REPORTED 10/09/2021    PROT 4.0 10/09/2021    CALCIUM 10.4 10/09/2021     PT/INR:  No results found for: PTINR, PROTIME, INR  PTT: No results found for: APTT   Radiology:  XR KNEE RIGHT (3 VIEWS)    Result Date: 10/7/2021  No acute osseous abnormality evident. Follow-up or additional imaging should be considered if pain persists or worsens. XR CHEST PORTABLE    Result Date: 10/9/2021  Endotracheal tube appears to be in satisfactory position but evaluation is limited due to kyphotic projection. Right IJ line tip appears to be in the right atrium. Severe diffuse bilateral airspace opacity. XR CHEST PORTABLE    Result Date: 10/7/2021  Persistent pulmonary findings but interval improvement as compared to 09/29/2021. CT CHEST PULMONARY EMBOLISM W CONTRAST    Result Date: 10/8/2021  1. Assessment of segmental and subsegmental pulmonary arteries is limited by significant respiratory motion. No evidence of central pulmonary embolus. 2. Extensive diffuse ground-glass attenuation in bilateral lungs. Commonly reported imaging features of COVID-19 pneumonia are present. Other processes such as influenza pneumonia and organizing pneumonia, as can be seen with drug toxicity and connective tissue disease, can cause a similar imaging pattern. PneTyp 3. 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 XR ABDOMEN FOR NG/OG/NE TUBE PLACEMENT    Result Date: 10/9/2021  OG tube in satisfactory position. Consultations:    Consults:     Final Specialist Recommendations/Findings:   IP CONSULT TO INFECTIOUS DISEASES  IP CONSULT TO PULMONOLOGY  IP CONSULT TO IV TEAM  IP CONSULT TO OTOLARYNGOLOGY  IP CONSULT TO ORTHOPEDIC SURGERY  IP CONSULT TO CARDIOLOGY  IP CONSULT TO NEPHROLOGY      The patient was seen and examined on day of discharge and this discharge summary is in conjunction with any daily progress note from day of discharge. Discharge plan:     Disposition:     Physician Follow Up:   No follow-up provider specified. Requiring Further Evaluation/Follow Up POST HOSPITALIZATION/Incidental Findings: see above    Diet: regular diet    Activity: As tolerated    Instructions to Patient: none    Discharge Medications:      Medication List      CONTINUE taking these medications    aspirin 81 MG tablet     clobetasol 0.05 % external solution  Commonly known as: TEMOVATE  Apply topically 3 times weekly to rash on scalp     ferrous sulfate 325 (65 Fe) MG tablet  Commonly known as: IRON 325     lisinopril 2.5 MG tablet  Commonly known as: PRINIVIL;ZESTRIL     loperamide 2 MG capsule  Commonly known as: IMODIUM     metFORMIN 500 MG tablet  Commonly known as: GLUCOPHAGE     PROBIOTIC PO     Salicylic Acid 3 % Sham  Use 3-4 times weekly leave on for 5 minutes prior to washing off scalp     WOMENS MULTIVITAMIN PO            No discharge procedures on file. Time Spent on discharge is  15 mins in patient examination, evaluation, counseling as well as medication reconciliation, prescriptions for required medications, discharge plan and follow up. Electronically signed by   Media DO Karen  10/9/2021  7:31 AM      Thank you Dr. Hu Lim for the opportunity to be involved in this patient's care.

## 2021-10-09 NOTE — FLOWSHEET NOTE
[facility]   Patient Death Note  DEATH   Shift date: 10/08/2021  Shift day: Friday  Shift #: 3                 Room # 3003/3003-01   Name: Sanjuana Costa            Age: 45 y.o. Gender: female          Scientologist: None      Place of Baptist: Unknown  Admit Date & Time: 2021  8:43 PM     Referral: Nurse  Actual date of death: 10/09/2021   TOD: 56       SITUATION AT DEATH:  Patient's family members were present at bedside and given medical update. Patient's family changed code status to [facility]. \" Soon after, patient's code status was changed to [facility]. \" Patient was \"terminally extubated. \" Patient's time of death was declared by Megan Kearney NP at 7534. IS THIS A 'S CASE? No    SPIRITUAL/EMOTIONAL INTERVENTION:   had been present with family prior to patient's death.  laid prayer blanket on patient's lap, when code status was changed to [facility]. \" [address] provided comfort, support and hospitality to patient's sister and mother.  set aside private time for family to be with patient after time of death.  shared list of possible  homes and cremation organizations with family, for their reference.  assisted patient's sister in completing Release of Body form.  will prepare grief packet and sympathy card for family. Family Received Grief Packet? Yes, [facility] [address] will mail grief packet, along with sympathy card to family. NAME AND PHONE NUMBER OF DOCTOR SIGNING DEATH CERTIFICATE: Dr. Mikayla Fitzpatrick, ([phone])     are now contacting the  home after a patient death.  spoke to/left message for N/A ( home) indicating family's choice for their services.  called  home on N/A (date) at N/A (time). Copy of COMPLETED Release of Body Form Received? Yes    Patient's belongings: Patient's belongings were taken by family.  HOME:  Not yet determined.     NEXT OF KIN:  Name: Rodger Ceballos  Relationship:   Street Address: [address]: [place]  Zip code: [address]  Phone Number: [phone]    ANY FOLLOW-UP NEEDED? Yes    IF SO, WHAT? Patient's family members received [facility] phone number to contact upon confirmation of final arrangements. 10/09/21 0323   Encounter Summary   Services provided to: Family   Referral/Consult From: Nurse   Support System Family members   Continue Visiting   (10/08/2021)   Complexity of Encounter High   Length of Encounter 1 hour;30 minutes   Spiritual/Voodoo   Type Spiritual support   Grief and Life Adjustment   Type End of life;Grief and loss;Death   Assessment Approachable;Tearful;Grieving;Helplessness; Despair   Intervention Active listening;Explored feelings, thoughts, concerns;Explored coping resources;Provided reading materials/devotional materials;Sustaining presence/ Ministry of presence; Discussed illness/injury and it's impact   Outcome Expressed gratitude; Tearful;Grieving;Venting emotion     Electronically signed by Skylar Joshua on 10/9/2021 at 5:24 AM.  [facility]  [phone]

## 2021-10-09 NOTE — FLOWSHEET NOTE
SPIRITUAL CARE DEPARTMENT - [facility] [address]  PROGRESS NOTE    Shift date: 10/08/2021  Shift day: Friday   Shift # 3    Room # 3003/3003-01   Name: Magdy Rouse            Age: 37[address] y.o. Gender: female          Anglican: Unknown   Place of Catholic: Unknown    Referral: DART Alert & Code Blue x2    Admit Date & Time: 9/21/2021  8:43 PM    PATIENT/EVENT DESCRIPTION:  Magdy Rouse is a [address] y.o. female who was paged out as a \"DART Alert,\" and was \"emergently intubated,\" soon after. Patient was later paged out as a \"Code Blue. \" Patient \"coded and received chest compressions,\" during two separate events. SPIRITUAL ASSESSMENT/INTERVENTION:   responded to nurse referral, requesting  visit with patient, prior to \"emergent intubation. \" Hanna Reef introduced herself to patient in room and shared words of encouragement. Patient was wearing breathing mask and was awake at time of 's visit. Patient was having difficulty breathing and responded to  using short phrases.  shared words of comfort and encouragement during visit. Patient thanked  for visit. Patient was \"emergently intubated,\" soon after. Patient was later paged out as a \"Code Blue,\" and received \"chest compressions,\" during two separate events. Patient's sister & Diamantina Brunner, was contacted by Luis Manuel Angel NP, and informed of the events of the night.  also called sister, per NP request, to inquire about her expected arrival.  greeted patient's sister and mom, Jovani Han, outside unit.  escorted them back to room, per nurse approval. Patient's family received further updates from care team at bedside. Patient's family members were tearful and upset over the events of the night. Patient's family members were receptive of 's support and care. SPIRITUAL CARE FOLLOW-UP PLAN:  Chaplains will remain available to offer spiritual and emotional support as needed.      10/08/21 6223   Encounter Summary Services provided to: Patient   Referral/Consult From: Nurse   Support System Parent; Family members   Continue Visiting   (10/08/2021)   Complexity of Encounter High   Length of Encounter 2 hours   Crisis   Type Code  (DART Alert)   Assessment Tearful; Anxious; Fearful;Helplessness   Intervention Active listening;Explored feelings, thoughts, concerns;Explored coping resources;Sustaining presence/ Ministry of presence; Discussed illness/injury and it's impact   Outcome Expressed gratitude; Tearful;Receptive   Spiritual/Episcopal   Type Spiritual support     Electronically signed by July Giordano on 10/9/2021 at 3:00 AM.  [facility]  [phone]

## 2021-10-09 NOTE — PROCEDURES
Arterial Line Placement Procedure Note          Performed by: Oliva Peralta MD,                Indication: cardiovascular instability and shock    Consent: Unable to be obtained due to patient's condition. Papi's Test: Not indicated in this particular procedure    Time out performed: Immediately prior to the procedure a \"time out\" was called to verify the correct patient, the correct procedure, equipment, support staff and site/side marked as required. Elements of sterile technique were attempted, but line was hurried secondary to cardiovascular instability     Procedure: The skin over the left femoral artery prepped with iodine. Local anesthesia was not performed due to the emergent nature of this procedure. A 20 gauge angiocath was then inserted, using a modified Seldinger technique, into the vessel. The transducer set was then attached and securely fastened to the skin with sutures and with an adhesive dressing. Waveforms on the monitor were observed and found to be adequate. The patient had good distal perfusion after the procedure. The site was then dressed in a sterile fashion.     Complications: None

## 2021-10-09 NOTE — ANESTHESIA PROCEDURE NOTES
Arterial Line:    An arterial line was placed using ultrasound guidance, in the ICU for the following indication(s): continuous blood pressure monitoring and blood sampling needed. A 20 gauge (size), 1 and 1/4 inch (length), Angiocath (type) catheter was placed, Seldinger technique used, into the left femoral artery, secured by suture, tape and Tegaderm. Anesthesia type: General    Events:  greater than 3 attempts. Additional notes: Attempted on bilateral radial and brachial arteries. Estefany Mariposa successfully placed left femoral arterial line.   Anesthesiologist: Jazmine Calderón MD  Resident/CRNA: SONJA Barrientos CRNA  Other anesthesia staff: SONJA Wolf CRNA  Performed: Resident/CRNA   Preanesthetic Checklist  Completed: patient identified, IV checked, risks and benefits discussed, monitors and equipment checked, pre-op evaluation, timeout performed, anesthesia consent given, oxygen available and patient being monitored

## 2021-10-09 NOTE — SIGNIFICANT EVENT
[facility]I paged over head    On my arrival, patient tachycardic and tachypneic on BiPAP relaying to nursing she is 'tired and wants to be intubated'. CRNA at bedside on my arrival prepping for intubation. Patient sedated and chemically paralyzed per RSI protocol with successful intubation. Airway obtained without difficulty, oxygen saturations did drop to as low was 44%. CRNA to place arterial line and anesthesia attending phoned per ICU charge for central line placement. Patient required sedation initially with propofol, however, she became hypotensive requiring initiation of levophed and sedation changed to Versed infusion. Despite sedation, patient continued with tachypnea and ventilator dyssynchrony. Discussed with critical care attending, ok to initiate Nimbex and Fentanyl infusion. STAT CXR pending and requested RT to obtain ABG once arterial line has been placed. Patient to remain in COVID-ICU    0010: Paged to bedside as 'patient is going to code'. On my arrival, patient in and out of Afib RVR and NSR. HR labile from 150s-70s with hypotension. Maxed out on Levophed at 40mcg/min. ABG reviewed: respiratory acidosis. Vasopressin ordered. Anesthesia remains at bedside attempting to place arterial lines and central line. 0100: Anesthesia continues to attempt to place lines. Patient with rhythm changes on the bedside monitor which appear to be VT however, patient with loss of pulses. CODE BLUE initiated with staff in room (see CODE BLUE documentation). ROSC returned at [address] Central and arterial line successful placed post code via Dr. Zena Castaneda. Post code gtts: Levophed @65 mcg, Neosynephrine @160mc6 and Epinephrine started, Versed gtt, Nimbex gtt and Fentanyl gtt. Post CODE labs and ABG pending. 0120: Spoke with patient's sister, Debra.  Updated on condition and critical changes. Questions and concerns addressed. 0140: Pulseless VT returned, anesthesia at bedside.   Repeat ABG: 6.8/116/56/18 with POC Lactic Acid: 14, K: 8.6 and CRT: 1.75. Patient given Calcium Gluc, 15 units Insulin and amp of BiCarb. Spoke with Dr. Kayy Mariscal and recent status and labs reviewed. [facility] for insulin gtt in attempts to lower K. Stat consult placed to Nephrology, Dr. Wendy Pugh to discuss urgent dialysis to treat hyperkalemia.    0300: Patient's sister, Debra (POA) and patient's mother at bedside. Patient continues to be critically ill and is nearly maxed out on Levophed, Epinephrine, Vasopressin and Neosynephrine. Awaiting repeat K. Code Status discussed with Debra and patient's mother. Explained that despite all current and previous heroic measures, further decompensation and cardiac arrest are of primary concern. We discussed that changing code status to [facility] at this time would be appropriate. All questions and concerns addressed. Patient's sister has called in her two nieces with anticipation to change code status to [facility].      0340: Spoke with Dr. Wendy Pugh. Will further discuss dialysis with patients family, will also proved K correction with Insulin/ D50 and Lokelma and start bicarb gtt per nephrology recs. 0400: Patient's sister, Debra after discussion with patient's family member at bedside has decided to change CODE STATUS to [facility]. They have also opted to not proceed with dialysis. Patient's sister and mother both state, 'we do not want her to suffer'. Family does have concerns regarding end of life for which I will defer to pastoral care. All questions and concerns addressed at this time. 7558: Patient into PEA on monitor. Writer, RNx2 and family at patient's bedside. TOD called at 0421. Emotional support provided and pastoral care paged for further support.

## 2021-10-09 NOTE — PROGRESS NOTES
code blue called at 0100. CPR began, rhythm vtach. 0101 1mg epi given. 0103 pulse check and pt found to be in PEA, resumed CPR. 0104 1mg epi given. 0105 pulse check, rosc. Et co2 63. 0109 britt gtt started at 100. 0110 epi gtt started at 10. Levo gtt remained. Vaso added as well. Situation was changing very quickly and gtts were titrated to BP.

## 2021-10-09 NOTE — PROGRESS NOTES
Increased RR to 38. Pt. Is not airtrapping on RR of 38, Vee remains at 0-1L/min and Total Peep is 34liS6A (what current PEEP is set at). ABG to be obtained in 30min.

## 2021-10-10 LAB
ANTI DNA DOUBLE STRANDED: 1.2 IU/ML
ANTI-NUCLEAR ANTIBODY (ANA): NEGATIVE
ENA ANTIBODIES SCREEN: 0.3 U/ML

## 2021-10-11 LAB
ACTION: NORMAL
ALLEN TEST: ABNORMAL
ALLEN TEST: ABNORMAL
ANION GAP: 10 MMOL/L (ref 7–16)
DATE AND TIME: NORMAL
FIO2: 100
FIO2: 100
GFR NON-AFRICAN AMERICAN: 33 ML/MIN
GFR SERPL CREATININE-BSD FRML MDRD: 39 ML/MIN
GFR SERPL CREATININE-BSD FRML MDRD: ABNORMAL ML/MIN/{1.73_M2}
GLUCOSE BLD-MCNC: 277 MG/DL (ref 74–100)
GLUCOSE BLD-MCNC: 387 MG/DL (ref 74–100)
MODE: ABNORMAL
MODE: ABNORMAL
NEGATIVE BASE EXCESS, ART: 13 (ref 0–2)
NEGATIVE BASE EXCESS, ART: 18 (ref 0–2)
NOTIFY: NORMAL
O2 DEVICE/FLOW/%: ABNORMAL
O2 DEVICE/FLOW/%: ABNORMAL
PATIENT TEMP: ABNORMAL
PATIENT TEMP: ABNORMAL
POC BUN: 19 MG/DL (ref 8–26)
POC CHLORIDE: 103 MMOL/L (ref 98–107)
POC CREATININE: 1.74 MG/DL (ref 0.51–1.19)
POC HCO3: 18.6 MMOL/L (ref 21–28)
POC HCO3: 21.7 MMOL/L (ref 21–28)
POC HEMATOCRIT: 38 % (ref 36–46)
POC HEMOGLOBIN: 12.8 G/DL (ref 12–16)
POC IONIZED CALCIUM: 1.36 MMOL/L (ref 1.15–1.33)
POC LACTIC ACID: 14.42 MMOL/L (ref 0.56–1.39)
POC O2 SATURATION: 57 % (ref 94–98)
POC O2 SATURATION: 62 % (ref 94–98)
POC PCO2 TEMP: ABNORMAL MM HG
POC PCO2 TEMP: ABNORMAL MM HG
POC PCO2: 116.1 MM HG (ref 35–48)
POC PCO2: 125.1 MM HG (ref 35–48)
POC PH TEMP: ABNORMAL
POC PH TEMP: ABNORMAL
POC PH: 6.81 (ref 7.35–7.45)
POC PH: 6.85 (ref 7.35–7.45)
POC PO2 TEMP: ABNORMAL MM HG
POC PO2 TEMP: ABNORMAL MM HG
POC PO2: 56 MM HG (ref 83–108)
POC PO2: 58.5 MM HG (ref 83–108)
POC POTASSIUM: 8.6 MMOL/L (ref 3.5–5.1)
POC SODIUM: 134 MMOL/L (ref 138–146)
POC TCO2: 22 MMOL/L (ref 22–30)
POSITIVE BASE EXCESS, ART: ABNORMAL (ref 0–3)
POSITIVE BASE EXCESS, ART: ABNORMAL (ref 0–3)
READ BACK: YES
SAMPLE SITE: ABNORMAL
SAMPLE SITE: ABNORMAL
TCO2 (CALC), ART: ABNORMAL MMOL/L (ref 22–29)
TCO2 (CALC), ART: ABNORMAL MMOL/L (ref 22–29)

## 2021-10-14 LAB
CULTURE: NORMAL
Lab: NORMAL
SPECIMEN DESCRIPTION: NORMAL

## 2023-01-03 NOTE — PROGRESS NOTES
Physical Therapy  Facility/Department: [facility] CAR 3  Daily Treatment Note  NAME: Manjinder Gamez  : 1980  MRN: [id number]    Date of Service: 10/4/2021    Discharge Recommendations:  Patient would benefit from continued therapy after discharge   PT Equipment Recommendations  Equipment Needed: No    Assessment   Assessment: Pt O2 sat has been dropping with minimal excertion, RN stated supine ther ex ok, if levels could bee maintained at 90 or above, pt able to tolerte 75% of supine ther ex, exercise deferred when o2 dropped to 87% and pt stated she felt SOB, O2 dropped to 84% than return to normal limits following rest and PLB. Would benefit from continued PT after d/c as appropriate to address further mobiltiy and return to prior level on independence  Prognosis: Good  PT Education: Goals;PT Role;Plan of Care; Functional Mobility Training;Gait Training;Transfer Training  Patient Education: PLB techniques  REQUIRES PT FOLLOW UP: Yes  Activity Tolerance  Activity Tolerance: Patient limited by endurance  Activity Tolerance: SPO2 decreasing with minimal exertion. Patient Diagnosis(es): The encounter diagnosis was COVID-19.     has a past medical history of Anemia, Chronic back pain, Diabetes mellitus ([address] H/O LEEP, Hypertension, Obesity, morbid, BMI 40.0-49.9 ([facility] [address] and S/P endometrial ablation. has a past surgical history that includes LEEP and Endometrial ablation. Restrictions  Restrictions/Precautions  Restrictions/Precautions: Isolation, Up as Tolerated, Fall Risk  Required Braces or Orthoses?: No  Position Activity Restriction  Other position/activity restrictions: High-flow O2 at 40L  Subjective   General  Chart Reviewed: Yes  Response To Previous Treatment: Patient with no complaints from previous session. Family / Caregiver Present: No  Subjective  Subjective: RN aggreable to therapy stated bed exercising only, Pt resting in bed upon arrival, agreeable to PT, on 40L hi sharad.   Pain Screening  Patient Currently in Pain: No  Pain Assessment  Pain Assessment: 0-10  Vital Signs  Patient Currently in Pain: No       Orientation  Orientation  Overall Orientation Status: Within Normal Limits  Cognition   Cognition  Overall Cognitive Status: WFL  Objective   Bed mobility  Supine to Sit: Unable to assess  Sit to Supine: Unable to assess  Scooting: Unable to assess  Comment: Pt in bed supine whole treatment per RN dt O2 saturation decreasing w/ movement  Transfers  Sit to Stand: Unable to assess  Stand to sit: Unable to assess  Bed to Chair: Unable to assess  Comment: VIDA d/t respiratory status  Ambulation  Ambulation: No  Stairs : No  Comments: VIDA d/t respiratory status     Balance  Comments: VIDA d/t respiratory status  Exercises  Comments: Supine Exercises: Ankle Pumps, Gluteal+ Hamstring Sets, Heel Slides, Hip ABD/ADD, Hip IR/ER, Quad Sets,  SLR. Reps: 10-15 bas pt tolerated, recovery breaks needed w/ PLB dt dropping sats.      AM-PAC Score  AM-PAC Inpatient Mobility Raw Score : 16 (10/04/21 1525)  AM-PAC Inpatient T-Scale Score : 40.78 (10/04/21 1525)  Mobility Inpatient CMS 0-100% Score: 54.16 (10/04/21 1525)  Mobility Inpatient CMS G-Code Modifier : CK (10/04/21 1525)          Goals  Short term goals  Time Frame for Short term goals: 14 visits  Short term goal 1: Perform bed mobility and functional transfers independently  Short term goal 2: Ambulate 300ft without AD and supervision  Short term goal 3: Participate in 30 minutes of therapy with SPO2 >90% to demo increased endurance  Short term goal 4: Ascend/descend 2 steps with HR and SBA    Plan    Plan  Times per week: 3-5x/wk  Current Treatment Recommendations: Strengthening, ROM, Balance Training, Functional Mobility Training, Transfer Training, Stair training, Gait Training, Endurance Training, Home Exercise Program, Safety Education & Training, Patient/Caregiver Education & Training  Safety Devices  Type of devices: Nurse notified, Call light within reach, Gait belt, Left in chair  Restraints  Initially in place: No     Therapy Time   Individual Concurrent Group Co-treatment   Time In 1320         Time Out 1345         Minutes 25         Timed Code Treatment Minutes: [address] = A little assistance

## 2025-02-04 NOTE — PLAN OF CARE
Problem: OXYGENATION/RESPIRATORY FUNCTION  Goal: Patient will achieve/maintain normal respiratory rate/effort  Description: Respiratory rate and effort will be within normal limits for the patient  Outcome: Ongoing Inadequate Oral Intake